# Patient Record
Sex: FEMALE | Race: WHITE | Employment: FULL TIME | URBAN - METROPOLITAN AREA
[De-identification: names, ages, dates, MRNs, and addresses within clinical notes are randomized per-mention and may not be internally consistent; named-entity substitution may affect disease eponyms.]

---

## 2018-12-20 PROBLEM — E78.2 MIXED HYPERLIPIDEMIA: Status: ACTIVE | Noted: 2018-12-20

## 2018-12-20 PROBLEM — E55.9 VITAMIN D DEFICIENCY: Status: ACTIVE | Noted: 2018-12-20

## 2018-12-20 PROBLEM — F41.9 ANXIETY: Status: ACTIVE | Noted: 2018-12-20

## 2018-12-20 PROBLEM — Z98.84 HISTORY OF GASTRIC BYPASS: Status: ACTIVE | Noted: 2018-12-20

## 2018-12-20 PROBLEM — F33.41 RECURRENT MAJOR DEPRESSIVE DISORDER, IN PARTIAL REMISSION (HCC): Status: ACTIVE | Noted: 2018-12-20

## 2018-12-20 PROBLEM — I10 ESSENTIAL HYPERTENSION: Status: ACTIVE | Noted: 2018-12-20

## 2018-12-20 PROBLEM — K21.9 GASTROESOPHAGEAL REFLUX DISEASE WITHOUT ESOPHAGITIS: Status: ACTIVE | Noted: 2018-12-20

## 2018-12-21 ENCOUNTER — HOSPITAL ENCOUNTER (OUTPATIENT)
Dept: GENERAL RADIOLOGY | Age: 58
Discharge: HOME | End: 2018-12-21
Attending: INTERNAL MEDICINE
Payer: COMMERCIAL

## 2018-12-21 DIAGNOSIS — M54.41 ACUTE BILATERAL LOW BACK PAIN WITH BILATERAL SCIATICA: ICD-10-CM

## 2018-12-21 DIAGNOSIS — M54.42 ACUTE BILATERAL LOW BACK PAIN WITH BILATERAL SCIATICA: ICD-10-CM

## 2018-12-21 PROCEDURE — 72110 X-RAY EXAM L-2 SPINE 4/>VWS: CPT

## 2018-12-24 NOTE — PROGRESS NOTES
DAYANNA on VM informing pt of Lumbar Spine Xray and Dr. George Schulz will review labs in detail at [address]

## 2019-01-17 ENCOUNTER — HOSPITAL ENCOUNTER (OUTPATIENT)
Dept: MRI IMAGING | Age: 59
Discharge: HOME | End: 2019-01-17
Attending: INTERNAL MEDICINE
Payer: COMMERCIAL

## 2019-01-17 DIAGNOSIS — M54.41 ACUTE RIGHT-SIDED LOW BACK PAIN WITH RIGHT-SIDED SCIATICA: ICD-10-CM

## 2019-01-17 PROCEDURE — 72148 MRI LUMBAR SPINE W/O DYE: CPT

## 2019-01-18 NOTE — PROGRESS NOTES
LM on VM informing pt of Lumbar Spine MRI results, referral to Neurosurgery and they will call the schedule appt.

## 2019-01-18 NOTE — PROGRESS NOTES
Lumbar MRI revealed L4-5 disc herniation, moderate to severe spinal stenosis; I have referred her to NS;

## 2019-01-31 PROBLEM — M51.26 HNP (HERNIATED NUCLEUS PULPOSUS), LUMBAR: Status: ACTIVE | Noted: 2019-01-31

## 2019-01-31 PROBLEM — M48.062 SPINAL STENOSIS OF LUMBAR REGION WITH NEUROGENIC CLAUDICATION: Status: ACTIVE | Noted: 2019-01-31

## 2019-01-31 PROBLEM — M54.41 ACUTE BILATERAL LOW BACK PAIN WITH BILATERAL SCIATICA: Status: ACTIVE | Noted: 2019-01-31

## 2019-01-31 PROBLEM — M54.42 ACUTE BILATERAL LOW BACK PAIN WITH BILATERAL SCIATICA: Status: ACTIVE | Noted: 2019-01-31

## 2019-02-06 ENCOUNTER — HOSPITAL ENCOUNTER (OUTPATIENT)
Dept: PHYSICAL THERAPY | Age: 59
End: 2019-02-06
Payer: COMMERCIAL

## 2019-02-07 ENCOUNTER — HOSPITAL ENCOUNTER (OUTPATIENT)
Dept: PHYSICAL THERAPY | Age: 59
Discharge: HOME | End: 2019-02-07
Payer: COMMERCIAL

## 2019-02-07 PROCEDURE — 97161 PT EVAL LOW COMPLEX 20 MIN: CPT

## 2019-02-07 PROCEDURE — 97110 THERAPEUTIC EXERCISES: CPT

## 2019-02-07 NOTE — THERAPY EVALUATION
Debra Iglesias  : 1960      Payor: Nimesh Sterling / Plan: [facility] / Product Type: PPO /  [address]. [facility], [address], [place], [address] [place] [address] Road  [phone]   [phone]       OUTPATIENT PHYSICAL THERAPY:Initial Assessment and PM 2019  Visit # 1   ICD-10: Treatment Diagnosis:    Low back pain (M54.5)        Sciatica, right side (M54.31)     Sciatica, left side (M54.32)     Lumbago with sciatica, right side (M54.41)     Lumbago with sciatica, left side (M54.42)       Precautions/Allergies:   Penicillins   Fall Risk Score: 5 (? 5 = High Risk)  MD Orders: Eval and Treat  MEDICAL/REFERRING DIAGNOSIS:  Other intervertebral disc displacement, lumbar region [M51.26]  Spinal stenosis, lumbar region with neurogenic claudication [M48.062]  Lumbago with sciatica, left side [M54.42]  Lumbago with sciatica, right side [M54.41]   DATE OF ONSET: *18  REFERRING PHYSICIAN: Marcellus Aquino MD  RETURN PHYSICIAN APPOINTMENT: 2019     INITIAL ASSESSMENT:  Ms. Debra Iglesias has attended 1 physical therapy session including initial evaluation. Debra Iglesias exhibits decreased flexibility, increased pain, decreased postural and core strength, decreased functional tolerance. Most painful movements at this time are extension, flexion. Symptoms are down R LE with lateral shift to R in walking and standing. MRI revealed: Broad-based L4-5 disc herniation, secondary moderate-severe spinal stenosis    Debra Iglesias will benefit from skilled PT (medically necessary) to address above deficits affecting participation in basic ADLs and overall functional tolerance.   Manual techniques (stretching, joint mobilizations, soft tissue mobilization/myofascial release), postural exercises/education, therapeutic techniques/activities, and HEP will be performed as appropriate addressing Trevon Files DARLENE Ventura's current condition. PROBLEM LIST (Impacting functional limitations):  1. Decreased Strength  2. Decreased ADL/Functional Activities  3. Decreased Transfer Abilities  4. Decreased Ambulation Ability/Technique  5. Decreased Balance  6. Increased Pain  7. Decreased Activity Tolerance  8. Increased Fatigue  9. Increased Shortness of Breath  10. Decreased Flexibility/Joint Mobility  11. Decreased [place] with Home Exercise Program INTERVENTIONS PLANNED:  1. Balance Exercise  2. Bed Mobility  3. Cold  4. Cryotherapy  5. Electrical Stimulation  6. Family Education  7. Gait Training  8. Heat  9. Home Exercise Program (HEP)  10. Manual Therapy  11. Neuromuscular Re-education/Strengthening  12. Range of Motion (ROM)  13. Therapeutic Activites  14. Therapeutic Exercise/Strengthening  15. Transfer Training   TREATMENT PLAN:  Effective Dates: 2/7/19 TO 5/8/2019 (90 days). Frequency/Duration: 2 times a week for 90 Days  GOALS: (Goals have been discussed and agreed upon with patient.)  Short Term Goals 4 weeks   1. Ravi Estrada will be independent with HEP  2. Ravi Estrada will participate in LE stretching program to increase flexibility  3. Ravi Estrada will participate in core stabilization exercises to help with stabilization during ADLs  4. Ravi Estrada will participate in LE strengthening program with weights as appropriate to help with gait and elevations  5. Ravi Estrada will participate in static and dynamic balance activities to decrease the risk for falls  6. Ravi Estrada will tolerate manual therapy/joint mobilizations/soft tissue to increase ROM and decrease pain  7. Ravi Estrada will be able to walk without pain and lift a laundry basket  Without minimal difficulty. Long Term Goals 8 weeks   1. Ravi Estrada will demonstrate a 10 point improvement on the Oswestry to show improvement in function  2.  Ravi Estrada will report 0/10 pain at rest and during ADLs  3. Elmer Hopping will demonstrate 5/5 LE strength on manual muscle testing  4. Elmer Hopping will be able to perform SLS >5 seconds bilaterally to help with gait and improve balance  Rehabilitation Potential For Stated Goals: Good  Regarding Onel Ventura's therapy, I certify that the treatment plan above will be carried out by a therapist or under their direction. Thank you for this referral,  Lena Chavez DPT     Referring Physician Signature: Joseph Bailon MD              Date                    HISTORY:   History of Present Injury/Illness (Reason for Referral):  I've been having back pain since September of last year. I had a sciatica episode and it developed into what is now this. I started having trouble walking from my desk to the printer. The tripping episodes really were what brought me into the MD.  I can't even lie in bed, I sleep in the sofa. I switched to Dr. Breann Sebastian and she referred me to DE[facility]MENT [facility] after my Xray and MRI. I saw Rob's NP and she said I have to do PT and  Pain management in order for the surgery to be covered by insurance. -Present symptoms/complaints (on day of evaluation)  Pain Scale:  · Current: 10/10  · Best: 9/10  · Worst: 10/10    Past Medical History/Comorbidities:   Ms. Ga Vargas  has a past medical history of Anxiety, Back problem, Depression, GERD (gastroesophageal reflux disease), History of mammogram (2014), History of Papanicolaou smear of cervix (>5 years aog), and Hyperlipidemia. Ms. Ga Vargas  has a past surgical history that includes hx cholecystectomy; pr breast surgery procedure unlisted (2014); hx gastric bypass (2014); and hx milka and bso.   Social History/Living Environment:        Social History     Socioeconomic History    Marital status:      Spouse name: Not on file    Number of children: 0    Years of education: Not on file    Highest education level: Not on file   Social Needs    Financial resource strain: Not on file    Food insecurity - worry: Not on file    Food insecurity - inability: Not on file    Transportation needs - medical: Not on file   [facility] needs - non-medical: Not on file   Occupational History    Occupation: [address], collections   Tobacco Use    Smoking status: Former Smoker     Packs/day: 1.00     Years: 30.00     Pack years: 30.00     Types: Cigarettes     Last attempt to quit: 2018     Years since quittin.1    Smokeless tobacco: Never Used   Substance and Sexual Activity    Alcohol use: Yes     Comment: Socially    Drug use: No    Sexual activity: Not on file   Other Topics Concern    Not on file   Social History Narrative    Not on file     Prior Level of Function/Work/Activity:  Independent, no issues, no difficulty. Active Ambulatory Problems     Diagnosis Date Noted    Vitamin D deficiency 2018    Recurrent major depressive disorder, in partial remission ([facility] [address] 2018    History of gastric bypass 2018    Anxiety 2018    Essential hypertension 2018    Mixed hyperlipidemia 2018    Gastroesophageal reflux disease without esophagitis 2018    HNP (herniated nucleus pulposus), lumbar 2019    Spinal stenosis of lumbar region with neurogenic claudication 2019    Acute bilateral low back pain with bilateral sciatica 2019     Resolved Ambulatory Problems     Diagnosis Date Noted    No Resolved Ambulatory Problems     Past Medical History:   Diagnosis Date    Anxiety     Back problem     Depression     GERD (gastroesophageal reflux disease)     History of mammogram     History of Papanicolaou smear of cervix >5 years aog    Hyperlipidemia      Note: Patient denies any increase of symptoms with cough, sneeze or valsalva. Patient denies any saddle paresthesia or bowel/bladder deficits.    Personal Factors:          Sex:  female        Age:  60 y.o.3     Ambulatory/Rehab Services H2 Model Falls Risk Assessment    Risk Factors:       (5)  History of Recent Falls [w/in 3 months] Ability to Rise from Chair:       (0)  Ability to rise in a single movement    Falls Prevention Plan:       Physical Limitations to Exercise (specify): CGA with all gait/mobility   Total: (5 or greater = High Risk): 5    ©2010 [facility] [employer]. All Rights Reserved. [facility] Patent #[id number]. Federal Law prohibits the replication, distribution or use without written permission from [facility] [employer]     Current Medications:    Current Outpatient Medications:     promethazine (PHENERGAN) 12.5 mg tablet, Take 1 Tab by mouth every eight (8) hours as needed for Nausea., Disp: 8 Tab, Rfl: 0    HYDROcodone-acetaminophen (NORCO) 5-325 mg per tablet, Take 2 Tabs by mouth every six (6) hours as needed for Pain. Max Daily Amount: 8 Tabs., Disp: 40 Tab, Rfl: 0    busPIRone (BUSPAR) 10 mg tablet, TAKE 1 TABLET BY MOUTH TWICE A DAY, Disp: 60 Tab, Rfl: 5    Cyclobenzaprine 7.5 mg tablet, Take 7.5 mg by mouth three (3) times daily for 30 days. , Disp: 30 Tab, Rfl: 5    DULoxetine (CYMBALTA) 30 mg capsule, TAKE 1 CAPSULE BY MOUTH TWICE A DAY, Disp: 30 Cap, Rfl: 5    ergocalciferol (ERGOCALCIFEROL) 50,000 unit capsule, Take 1 Cap by mouth every seven (7) days for 30 days. , Disp: 4 Cap, Rfl: 5    gabapentin (NEURONTIN) 300 mg capsule, TAKE 1 CAPSULE BY MOUTH TWICE A DAY, Disp: 60 Cap, Rfl: 5    hydroCHLOROthiazide (MICROZIDE) 12.5 mg capsule, Take 1 Cap by mouth daily. , Disp: 30 Cap, Rfl: 5    Omeprazole delayed release (PRILOSEC D/R) 20 mg tablet, Take 1 Tab by mouth daily. OTC, Disp: 30 Tab, Rfl: 5    simvastatin (ZOCOR) 10 mg tablet, Take 1 Tab by mouth daily. , Disp: 30 Tab, Rfl: 5    FLUoxetine (PROZAC) 40 mg capsule, Take 1 Cap by mouth daily. , Disp: , Rfl:     multivitamin (ONE A DAY) tablet, Take 1 Tab by mouth daily. , Disp: , Rfl:      Date Last Reviewed:  2/7/2019   Number of Personal Factors/Comorbidities that affect the Plan of Care: 3+: HIGH COMPLEXITY   EXAMINATION:   Observation/Orthostatic Postural Assessment:          Leans to L in order to get pain relief, but has a lot of medial to lateral sway. Palpation:          R PSIS and lower lumbar segements on R  Lying down is preference. Special Tests:               JORDAN= Positive              SLR (<60 degrees)= Positive              SLUMP= Positive                                  SI Joint Tests: Negative [place], JORDAN, Thigh Thrust, ASIS Distraction, Sacral Compression    Lower Quarter Screen Eval Date: 2.7.19  Re-Assess Date:  Re-Assess Date:    Active ROM of Lumbar Spine  RIGHT LEFT RIGHT LEFT RIGHT LEFT          Flexion 1 foot from ground tips of fingers. Extension To neutral only                Side flexion 60% 40%              Rotation 70% 100%       Deep Squat (for general function)         Single Leg Stance (Bilateral) or Sidelying Hip Abduction (L5, S1)         Toe Walking (S1) Painful short step length        Heel walking (L4,5) Painful and decreased                           MMT         Resisted Hip Flexion (L1,2) 4+/5   Unable to lift up in sitting due to pressure on R LE. Resisted Knee Extension or Unilateral Sit to Stand (L3,4) 4/5 5/5       Great Toe Extension (L5) Decreased Normal        Sensory Examination (EYES SHUT; dermatomes, use cotton ball on bare skin; L2 top of thigh, L3 Medial Knee; L4 Lateral Thigh, L5 Web space of great toe, S1 outside of foot, S2 inside of foot)     S1 diminished, L4 diminished Normal       Reflex testing (0, 1+, 2+, 3+, 4+)              Patella Decreased 1+ Decreased 1+            Achilles Decreased 1+ Decreased 1+                 ROM         Hip flexion + SLR; 70 deg 80 deg with +SLR for R LE with this movement.                            Upper Motor Neuron Test if needed (Sanchezs, Babinski, Clonus, Supra Patella, Inverted Supinator Sign) Negative Negative Neurological Screen:    RADIATING SYMPTOMS?: YES/NO---YES down lateral R leg and over dorsum of foot. Functional Mobility:  Affecting participation in basic ADLs and functional tasks. Balance:          Cannot balance on R LE. Body Structures Involved:  1. Bones  2. Joints  3. Muscles  4. Ligaments Body Functions Affected:  1. Sensory/Pain  2. Neuromusculoskeletal  3. Movement Related Activities and Participation Affected:  1. Mobility  2. Self Care   Number of elements that affect the Plan of Care: 4+: HIGH COMPLEXITY   CLINICAL PRESENTATION:   Presentation: Evolving clinical presentation with changing clinical characteristics: MODERATE COMPLEXITY   CLINICAL DECISION MAKING:   Outcome Measure: Tool Used: Modified Oswestry Low Back Pain Questionnaire  Score:  Initial: 43/50  Most Recent: X/50 (Date: -- )   Interpretation of Score: Each section is scored on a 0-5 scale, 5 representing the greatest disability. The scores of each section are added together for a total score of 50. Score 0 1-10 11-20 21-30 31-40 41-49 50   Modifier CH CI CJ CK CL CM CN       Medical Necessity:   · Skilled intervention continues to be required due to low back pain radicular in nature affecting gait and mobility and QOL. Reason for Services/Other Comments:  · Patient continues to require skilled intervention due to  above deficits affecting participation in basic ADLs and overall functional tolerance. Use of outcome tool(s) and clinical judgement create a POC that gives a: Clear prediction of patient's progress: LOW COMPLEXITY      Treatment/Session Assessment:  Glendora Osler verbalized understanding of role of PT and POC. · Pain/ Symptoms: Initial:   10/10 Post Session:  10/10 ·   Compliance with Program/Exercises: Will assess as treatment progresses. · Recommendations/Intent for next treatment session: \"Next visit will focus on advancements to more challenging activities\".     Future Appointments   Date Time Provider Mahsa Renuka   3/5/2019 11:15 AM Arun Diggs MD [address]   7/9/2019  3:40 PM Ghazala Grewal MD [facility] MAT       Total Treatment Duration:  PT Patient Time In/Time Out  Time In: 1320  Time Out: [facility] AUNG PetitT

## 2019-02-07 NOTE — PROGRESS NOTES
Celena Howell  : 1960  Payor: Doreen Botello / Plan: [facility] / Product Type: PPO /  [address]. Sheyla Mota., [address] [place] [address], [facility], [address] [place] [address]  [phone]   [phone]          OUTPATIENT PHYSICAL THERAPY: Daily Treatment Note 2019 Visit Count:  1    Pre-treatment Symptoms/Complaints:  Pain in standing and all positions. Improves with opening R LE. Pain: Initial:   10/10 Post Session:  10/10   Medications Last Reviewed:  2019   Updated Objective Findings:  None today   TREATMENT:   THERAPEUTIC EXERCISE: (25 minutes):  Exercises per grid below to improve mobility, strength and balance. Required minimal visual, verbal and manual cues to promote proper body alignment and promote proper body posture. Progressed resistance and complexity of movement as indicated. Date:  19 Date:   Date:     Activity/Exercise Parameters Parameters Parameters   LTR To L only     SKTC 30\"x3 R LE only      Nerve glides Next session. [place]? MANUAL THERAPY: (-next visit- minutes): Joint mobilization and Soft tissue mobilization was utilized and necessary because of the patient's restricted joint motion and restricted motion of soft tissue. Techniques to open R LE   SL L Grade II mobs to decrease pain. [employer] Portal  Treatment/Session Summary:    · Response to Treatment:  Able to find position of some relief. .  · Communication/Consultation:  None today  · Equipment provided today:  HEP   Recommendations/Intent for next treatment session: Next visit will focus on opening up R side . Treatment Plan of Care Effective Dates: 19 TO 2019 (90 days).   Frequency/Duration: 2 times a week for 90 Days  Total Treatment Billable Duration:  25 minutes  PT Patient Time In/Time Out  Time In: 1320  Time Out: 1 Saman Almendarez DPT    Future Appointments   Date Time Provider Mahsa Grayson 3/5/2019 11:15 AM Jessy Ballard MD [address]   7/9/2019  3:40 PM Eryn Infante MD [facility] MAT

## 2019-02-12 ENCOUNTER — HOSPITAL ENCOUNTER (OUTPATIENT)
Dept: PHYSICAL THERAPY | Age: 59
Discharge: HOME | End: 2019-02-12
Payer: COMMERCIAL

## 2019-02-12 PROCEDURE — 97140 MANUAL THERAPY 1/> REGIONS: CPT

## 2019-02-12 PROCEDURE — 97110 THERAPEUTIC EXERCISES: CPT

## 2019-02-12 NOTE — PROGRESS NOTES
[place]  : 1960  Payor: Carmen Severance / Plan: [facility] / Product Type: PPO /  [address] [facility] at [address] [place] [address] [address] [place] [address]  [phone]   [phone]          OUTPATIENT PHYSICAL THERAPY: Daily Treatment Note 2019 Visit Count:  2    Pre-treatment Symptoms/Complaints:  Pain in standing and all positions. Improves with opening R LE. Pain: Initial:   1/10 Post Session:  1/10   Medications Last Reviewed:  2019   Updated Objective Findings:  None today   TREATMENT:   THERAPEUTIC EXERCISE: (25 minutes):  Exercises per grid below to improve mobility, strength and balance. Required minimal visual, verbal and manual cues to promote proper body alignment and promote proper body posture. Progressed resistance and complexity of movement as indicated. Date:  19 Date:  19   Date:     Activity/Exercise Parameters Parameters Parameters   LTR To L only 10\"x10 L only. SKTC 30\"x3 R LE only  30\"x3 R LE only     Nerve glides Next session. ---    Elsworth Fines?  ----    NuStep  5' minutes level 2. MANUAL THERAPY: (-*15* minutes): Joint mobilization and Soft tissue mobilization was utilized and necessary because of the patient's restricted joint motion and restricted motion of soft tissue. Techniques to open R LE   SL L Grade II mobs to decrease pain. [employer] Portal  Treatment/Session Summary:  Started NuStep to encourage movement and for possible nerve glide. · Response to Treatment:  Able to find position of some relief. .  · Communication/Consultation:  None today  · Equipment provided today:  HEP   Recommendations/Intent for next treatment session: Next visit will focus on opening up R side . Treatment Plan of Care Effective Dates: 19 TO 2019 (90 days).   Frequency/Duration: 2 times a week for 90 Days  Total Treatment Billable Duration:  38 minutes  PT Patient Time In/Time Out  Time In: 1338  Time Out: [address] Tati Dykes, DPT    Future Appointments   Date Time Provider Mahsa Gilli   2/12/2019  1:45 PM Liz Vasquez DPT [facility]   2/14/2019  1:45 PM Liz Vasquez DPT [facility]   2/19/2019  1:45 PM Liz Vasquez DPT [facility]   2/21/2019  2:30 PM Liz Vasquez DPT [facility]   2/26/2019  2:30 PM Liz Vasquez DPT [facility]   2/28/2019  2:30 PM Liz Vasquez DPT [facility]   3/5/2019 11:15 AM Salvador Kaufman MD [place] [id number]   3/5/2019  1:45 PM Liz Vasquez DPT [facility]   3/7/2019  1:45 PM Liz Vasquez DPT [facility]   7/9/2019  3:40 PM Uriel Montenegro MD [facility]

## 2019-02-13 NOTE — PROGRESS NOTES
I am accessing Ms. Ventura's chart as a part of our department's internal chart auditing process. I certify that Ms. Lyle Allen is, or was, a patient in our department.   Thank you,  Anjana Licea  2/13/2019

## 2019-02-14 ENCOUNTER — HOSPITAL ENCOUNTER (OUTPATIENT)
Dept: PHYSICAL THERAPY | Age: 59
Discharge: HOME | End: 2019-02-14
Payer: COMMERCIAL

## 2019-02-14 PROCEDURE — 97110 THERAPEUTIC EXERCISES: CPT

## 2019-02-14 PROCEDURE — 97140 MANUAL THERAPY 1/> REGIONS: CPT

## 2019-02-14 NOTE — PROGRESS NOTES
Irasema Wolfe  : 1960  Payor: Justin Spivey / Plan: [facility] / Product Type: PPO /  [address]. [address] [place] [address], [facility], [address] [place] [address] Road  [phone]   [phone]          OUTPATIENT PHYSICAL THERAPY: Daily Treatment Note 2019 Visit Count:  3    Pre-treatment Symptoms/Complaints:  Pain in standing and all positions. Improves with opening R LE. Pain: Initial:   1/10 Post Session:  1/10   Medications Last Reviewed:  2019   Updated Objective Findings:  None today   TREATMENT:   THERAPEUTIC EXERCISE: (15 minutes):  Exercises per grid below to improve mobility, strength and balance. Required minimal visual, verbal and manual cues to promote proper body alignment and promote proper body posture. Progressed resistance and complexity of movement as indicated. Date:  19 Date:  19   Date:  19     Activity/Exercise Parameters Parameters Parameters   LTR To L only 10\"x10 L only. 10x10\"   SKTC 30\"x3 R LE only  30\"x3 R LE only  30\"x3 R LE only    Nerve glides Next session. ---    Sharda Pringle?  ---- nv   NuStep  5' minutes level 2. 5' minutes level 2. Hip flexion iso   5\" x 5   Hip adduction   5\"x10x2         MANUAL THERAPY: (-*25* minutes): Joint mobilization and Soft tissue mobilization was utilized and necessary because of the patient's restricted joint motion and restricted motion of soft tissue. Techniques to open R LE   SL L Grade II mobs to decrease pain. [employer] Portal  Treatment/Session Summary:  Feels better end of session. · Response to Treatment:  Able to find position of some relief. .  · Communication/Consultation:  None today  · Equipment provided today:  HEP   Recommendations/Intent for next treatment session: Next visit will focus on opening up R side . Treatment Plan of Care Effective Dates: 19 TO 2019 (90 days).   Frequency/Duration: 2 times a week for 90 Days  Total Treatment Billable Duration:  40 minutes  PT Patient Time In/Time Out  Time In: 1340  Time Out: [address], DPT    Future Appointments   Date Time Provider Mahsa Grayson   2/19/2019  1:45 PM Dipika Treadwell, DPT [facility]   2/21/2019  2:30 PM Dipika Treadwell, DPT [facility]   2/26/2019  2:30 PM Dipika Treadwell, DPT [facility]   2/28/2019  2:30 PM Dipika Treadwell, DPT [facility]   3/5/2019 11:15 AM Ganga Patel MD [place] [address]   3/5/2019  1:45 PM Dipika Treadwell, [facility]PT [facility]   3/7/2019  1:45 PM Dipika Treadwell, DPT [facility]   7/9/2019  3:40 PM Cresenciano Favre, MD [facility]

## 2019-02-19 ENCOUNTER — APPOINTMENT (OUTPATIENT)
Dept: PHYSICAL THERAPY | Age: 59
End: 2019-02-19
Payer: COMMERCIAL

## 2019-02-19 NOTE — Clinical Note
CONSULTATION NOTE Patient: Diamond Chen     DOS: 2/19/2019 Physician: Sherie Lui MD    
SSN:  [id number]     MRN: [id number] Referring: Angel Allen. Arsen Torres MD 
 
Subjective:  
 
HISTORY OF PRESENT ILLNESS: *** PAST HISTORY: 
Past Medical History:  
Diagnosis Date  Anxiety  Back problem  Depression  GERD (gastroesophageal reflux disease)  History of mammogram 2014  History of Papanicolaou smear of cervix >5 years aog  Hyperlipidemia ALLERGIES:  
Allergies Allergen Reactions  Penicillins Hives MEDICATIONS: 
Current Outpatient Medications Medication Sig Dispense Refill  methocarbamol (ROBAXIN) 750 mg tablet Take 1 tablet by mouth every 6 to 8 hours.  promethazine (PHENERGAN) 12.5 mg tablet Take 1 Tab by mouth every eight (8) hours as needed for Nausea. 8 Tab 0  
 HYDROcodone-acetaminophen (NORCO) 5-325 mg per tablet Take 2 Tabs by mouth every six (6) hours as needed for Pain. Max Daily Amount: 8 Tabs. 40 Tab 0  
 busPIRone (BUSPAR) 10 mg tablet TAKE 1 TABLET BY MOUTH TWICE A DAY 60 Tab 5  DULoxetine (CYMBALTA) 30 mg capsule TAKE 1 CAPSULE BY MOUTH TWICE A DAY 30 Cap 5  
 gabapentin (NEURONTIN) 300 mg capsule TAKE 1 CAPSULE BY MOUTH TWICE A DAY 60 Cap 5  hydroCHLOROthiazide (MICROZIDE) 12.5 mg capsule Take 1 Cap by mouth daily. 30 Cap 5  
 Omeprazole delayed release (PRILOSEC D/R) 20 mg tablet Take 1 Tab by mouth daily. OTC 30 Tab 5  
 simvastatin (ZOCOR) 10 mg tablet Take 1 Tab by mouth daily. 30 Tab 5  FLUoxetine (PROZAC) 40 mg capsule Take 1 Cap by mouth daily.  multivitamin (ONE A DAY) tablet Take 1 Tab by mouth daily. SURGICAL HISTORY:  
Past Surgical History:  
Procedure Laterality Date  BREAST SURGERY PROCEDURE UNLISTED  2014  
 breast implants  HX CHOLECYSTECTOMY  HX GASTRIC BYPASS  2014 Dr. Natasha Berumen  HX CHARO AND BSO    
  
 
SOCIAL HISTORY:  
Social History Socioeconomic History  Marital status:  Spouse name: Not on file  Number of children: 0  
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  
 Sexual activity: Not on file Other Topics Concern  Not on file Social History Narrative  Not on file REVIEW OF SYSTEMS:  
CONSTITUTIONAL: No weight change, fevers, illnesses. CARDIOVASCULAR/RESPIRATORY: negative. GASTROINTESTINAL/GENITOURINARY: otherwise, no side effects from medications. MUSCULOSKELETAL: see above. HEMATOLOGIC: no antiplatelet or anticoagulants; no hemophilia or other bleeding tendencies. NEUROLOGIC: no new deficits. PSYCHIATRIC: negative. ALLERGY: no new. otherwise non-contributory. The remainder of the past medical, family, and social histories were reviewed in detail in the initial pain assessment questionnaire and with the patient. Objective: PHYSICAL EXAM: 
HR: 97 RR: 14 BP: 130/78 Head and Neck: Normocephalic, Atraumatic, Cranial nerves II-XII intact, full range of motion in neck Chest and Abdomen: Heart: Regular Rate and Rhythm without murmur; clear lungs bilaterally Back: diffuse lumbosacral tenderness Extremities: no focal motor or sensory deficits; distal pulses palpable throughout; deep tendon reflexes symmetric and non-pathologic throughout IMAGING:MRI, 1-17-19: 
 
IMPRESSION: 
Broad-based L4-5 disc herniation, secondary moderate-severe spinal stenosis Impression/Plan: We are consulted by Dr. Kassi Moreno for evaluation and treatment to include an epidural steroid injection series. She will have the first under fluoroscopy here in clinic at the next available opportunity and a follow-up 2-3 weeks later to discuss a second unless pain-free or worse from the first.  
 
The benefits and relative risks of the procedure as well as treatment alternatives were explained in detail to the patient. The patient voiced their understanding and desire to proceed with the procedure. Signed By: Tayler Hill MD   
 February 19, 2019

## 2019-02-21 ENCOUNTER — APPOINTMENT (OUTPATIENT)
Dept: PHYSICAL THERAPY | Age: 59
End: 2019-02-21
Payer: COMMERCIAL

## 2019-02-24 ENCOUNTER — HOSPITAL ENCOUNTER (EMERGENCY)
Age: 59
Discharge: HOME | End: 2019-02-24
Attending: EMERGENCY MEDICINE
Payer: COMMERCIAL

## 2019-02-24 VITALS
SYSTOLIC BLOOD PRESSURE: 133 MMHG | OXYGEN SATURATION: 92 % | HEIGHT: 63 IN | RESPIRATION RATE: 26 BRPM | TEMPERATURE: 98.3 F | DIASTOLIC BLOOD PRESSURE: 69 MMHG | HEART RATE: 92 BPM | WEIGHT: 150 LBS | BODY MASS INDEX: 26.58 KG/M2

## 2019-02-24 DIAGNOSIS — M54.10 ACUTE LOW BACK PAIN WITH RADICULAR SYMPTOMS, DURATION LESS THAN 6 WEEKS: Primary | ICD-10-CM

## 2019-02-24 PROCEDURE — 99284 EMERGENCY DEPT VISIT MOD MDM: CPT | Performed by: EMERGENCY MEDICINE

## 2019-02-24 PROCEDURE — 96374 THER/PROPH/DIAG INJ IV PUSH: CPT | Performed by: EMERGENCY MEDICINE

## 2019-02-24 PROCEDURE — 74011250636 HC RX REV CODE- 250/636: Performed by: EMERGENCY MEDICINE

## 2019-02-24 PROCEDURE — 96375 TX/PRO/DX INJ NEW DRUG ADDON: CPT | Performed by: EMERGENCY MEDICINE

## 2019-02-24 RX ORDER — HYDROMORPHONE HYDROCHLORIDE 2 MG/ML
1 INJECTION, SOLUTION INTRAMUSCULAR; INTRAVENOUS; SUBCUTANEOUS
Status: COMPLETED | OUTPATIENT
Start: 2019-02-24 | End: 2019-02-24

## 2019-02-24 RX ORDER — MORPHINE SULFATE 15 MG/1
15 TABLET ORAL
Qty: 20 TAB | Refills: 0 | Status: SHIPPED | OUTPATIENT
Start: 2019-02-24 | End: 2019-02-27 | Stop reason: SDUPTHER

## 2019-02-24 RX ORDER — TIZANIDINE 4 MG/1
4 TABLET ORAL
COMMUNITY
End: 2019-04-09 | Stop reason: SDUPTHER

## 2019-02-24 RX ORDER — KETOROLAC TROMETHAMINE 30 MG/ML
15 INJECTION, SOLUTION INTRAMUSCULAR; INTRAVENOUS
Status: COMPLETED | OUTPATIENT
Start: 2019-02-24 | End: 2019-02-24

## 2019-02-24 RX ORDER — ONDANSETRON 2 MG/ML
4 INJECTION INTRAMUSCULAR; INTRAVENOUS
Status: COMPLETED | OUTPATIENT
Start: 2019-02-24 | End: 2019-02-24

## 2019-02-24 RX ORDER — DEXAMETHASONE SODIUM PHOSPHATE 100 MG/10ML
10 INJECTION INTRAMUSCULAR; INTRAVENOUS
Status: COMPLETED | OUTPATIENT
Start: 2019-02-24 | End: 2019-02-24

## 2019-02-24 RX ORDER — LEVETIRACETAM 500 MG/1
500 TABLET ORAL
COMMUNITY
End: 2019-03-11 | Stop reason: CLARIF

## 2019-02-24 RX ORDER — BACLOFEN 10 MG/1
10 TABLET ORAL
COMMUNITY
End: 2019-03-11 | Stop reason: CLARIF

## 2019-02-24 RX ORDER — METHYLPREDNISOLONE 4 MG/1
TABLET ORAL
Qty: 1 DOSE PACK | Refills: 0 | Status: SHIPPED | OUTPATIENT
Start: 2019-02-24 | End: 2019-03-11 | Stop reason: CLARIF

## 2019-02-24 RX ADMIN — HYDROMORPHONE HYDROCHLORIDE 1 MG: 2 INJECTION, SOLUTION INTRAMUSCULAR; INTRAVENOUS; SUBCUTANEOUS at 09:33

## 2019-02-24 RX ADMIN — KETOROLAC TROMETHAMINE 15 MG: 30 INJECTION, SOLUTION INTRAMUSCULAR at 09:32

## 2019-02-24 RX ADMIN — DEXAMETHASONE SODIUM PHOSPHATE 10 MG: 10 INJECTION INTRAMUSCULAR; INTRAVENOUS at 09:33

## 2019-02-24 RX ADMIN — ONDANSETRON 4 MG: 2 INJECTION INTRAMUSCULAR; INTRAVENOUS at 09:31

## 2019-02-24 NOTE — ED NOTES
I have reviewed discharge instructions with the patient. The patient verbalized understanding. Patient left ED via Discharge Method: ambulatory to Home with spouse. Opportunity for questions and clarification provided. Patient given 2 scripts. To continue your aftercare when you leave the hospital, you may receive an automated call from our care team to check in on how you are doing. This is a free service and part of our promise to provide the best care and service to meet your aftercare needs.  If you have questions, or wish to unsubscribe from this service please call [phone]. Thank you for Choosing our [facility] Emergency Department.

## 2019-02-24 NOTE — ED PROVIDER NOTES
Per nurse's notes: \"Pt to ED c/o lower back pain that shoots down right leg. Pt is a patient of Dr. Olinda Noland and had a lumbar epidural steroid injection on Thursday.  states pain has been worse since then. Pt barely able to walk and has to be coached to slow breathing. Pt taking keppra, tizanidine and baclofen w/o relief. Known L4 and L5 disc herniations. \" The history is provided by the patient. Back Pain This is a new problem. The current episode started more than 2 days ago. The problem has been gradually worsening. The problem occurs constantly. Patient reports not work related injury. The pain is associated with no known injury. The pain is present in the lower back and right side. The quality of the pain is described as stabbing, aching, sharp and shooting. The pain radiates to the right thigh, right knee and right foot. The pain is at a severity of 10/10. The symptoms are aggravated by bending, twisting and certain positions. The pain is the same all the time. Stiffness is present all day. Associated symptoms include tingling (extending from the right anterior hip down to the right knee and foot). Pertinent negatives include no chest pain, no fever, no numbness, no weight loss, no headaches, no abdominal pain, no abdominal swelling, no bowel incontinence, no perianal numbness, no bladder incontinence, no dysuria, no pelvic pain, no leg pain, no paresis and no weakness. She has tried bed rest (patient was given a epidural injection 3 days ago and has become progressively worse since that time.) for the symptoms. The treatment provided no relief. Risk factors include menopause. The patient's surgical history non-contributory Past Medical History:  
Diagnosis Date  Anxiety  Back problem  Depression  GERD (gastroesophageal reflux disease)  History of mammogram 2014  History of Papanicolaou smear of cervix >5 years aog  Hyperlipidemia Past Surgical History: Procedure Laterality Date  BREAST SURGERY PROCEDURE UNLISTED    
 breast implants  HX CHOLECYSTECTOMY  HX GASTRIC BYPASS   Dr. Quinones Leader  HX CHARO AND BSO Family History:  
Problem Relation Age of Onset  Breast Cancer Mother  Hypertension Mother  Stroke Father  Hypertension Father  Stroke Maternal Grandmother  Stroke Maternal Grandfather  Stroke Paternal Grandmother  Stroke Paternal Grandfather Social History Socioeconomic History  Marital status:  Spouse name: Not on file  Number of children: 0  
 Years of education: Not on file  Highest education level: Not on file Social Needs  Financial resource strain: Not on file  Food insecurity - worry: Not on file  Food insecurity - inability: Not on file  Transportation needs - medical: Not on file  Transportation needs - non-medical: Not on file Occupational History  Occupation: [employer], collections Tobacco Use  Smoking status: Former Smoker Packs/day: 1.00 Years: 30.00 Pack years: 30.00 Types: Cigarettes Last attempt to quit: 2018 Years since quittin.1  Smokeless tobacco: Never Used Substance and Sexual Activity  Alcohol use: Yes Comment: Socially  Drug use: No  
 Sexual activity: Not on file Other Topics Concern  Not on file Social History Narrative  Not on file ALLERGIES: Penicillins Review of Systems Constitutional: Negative for chills, fever and weight loss. Cardiovascular: Negative for chest pain. Gastrointestinal: Negative for abdominal pain, bowel incontinence, nausea and vomiting. Genitourinary: Negative for bladder incontinence, difficulty urinating, dysuria and pelvic pain. Musculoskeletal: Positive for back pain. Negative for neck pain and neck stiffness.   
Neurological: Positive for tingling (extending from the right anterior hip down to the right knee and foot). Negative for weakness, numbness and headaches. All other systems reviewed and are negative. Vitals:  
 02/24/19 0900 BP: 147/75 Pulse: 87 Resp: 26 Temp: 98.3 °F (36.8 °C) SpO2: 97% Weight: 68 kg (150 lb) Height: 5' 3\" (1.6 m) Physical Exam  
Constitutional: She is oriented to person, place, and time. She appears well-developed and well-nourished. She appears distressed. Well-developed  female in obvious pain and tearful on the cart HENT:  
Head: Normocephalic and atraumatic. Right Ear: External ear normal.  
Left Ear: External ear normal.  
Mouth/Throat: Oropharynx is clear and moist.  
Eyes: Conjunctivae and EOM are normal. Pupils are equal, round, and reactive to light. Neck: Normal range of motion. Neck supple. No thyromegaly present. Cardiovascular: Normal rate, regular rhythm, normal heart sounds and intact distal pulses. Pulmonary/Chest: Effort normal and breath sounds normal.  
Abdominal: Soft. Bowel sounds are normal. There is no tenderness. No hernia. Musculoskeletal: She exhibits no edema. Lumbar back: She exhibits decreased range of motion, tenderness and pain. She exhibits no swelling, no edema, no deformity, no spasm and normal pulse. Neurological: She is alert and oriented to person, place, and time. She has normal strength. No cranial nerve deficit or sensory deficit. Positive straight leg raise on the right Skin: Skin is warm and dry. Capillary refill takes less than 2 seconds. Psychiatric: She has a normal mood and affect. Nursing note and vitals reviewed. MDM Number of Diagnoses or Management Options Acute low back pain with radicular symptoms, duration less than 6 weeks: new and does not require workup Amount and/or Complexity of Data Reviewed Review and summarize past medical records: yes (Patient's MRI revealed some spinal stenosis as well as foraminal narrowing L4, L5 and HNP at L4-L5.) Risk of Complications, Morbidity, and/or Mortality Presenting problems: moderate Diagnostic procedures: minimal 
Management options: moderate Patient Progress Patient progress: improved Procedures

## 2019-02-24 NOTE — DISCHARGE INSTRUCTIONS
Patient Education        Learning About Relief for Back Pain  What is back tension and strain? Back strain happens when you overstretch, or pull, a muscle in your back. You may hurt your back in an accident or when you exercise or lift something. Most back pain will get better with rest and time. You can take care of yourself at home to help your back heal.  What can you do first to relieve back pain? When you first feel back pain, try these steps:  · Walk. Take a short walk (10 to 20 minutes) on a level surface (no slopes, hills, or stairs) every 2 to 3 hours. Walk only distances you can manage without pain, especially leg pain. · Relax. Find a comfortable position for rest. Some people are comfortable on the floor or a medium-firm bed with a small pillow under their head and another under their knees. Some people prefer to lie on their side with a pillow between their knees. Don't stay in one position for too long. · Try heat or ice. Try using a heating pad on a low or medium setting, or take a warm shower, for 15 to 20 minutes every 2 to 3 hours. Or you can buy single-use heat wraps that last up to 8 hours. You can also try an ice pack for 10 to 15 minutes every 2 to 3 hours. You can use an ice pack or a bag of frozen vegetables wrapped in a thin towel. There is not strong evidence that either heat or ice will help, but you can try them to see if they help. You may also want to try switching between heat and cold. · Take pain medicine exactly as directed. ¨ If the doctor gave you a prescription medicine for pain, take it as prescribed. ¨ If you are not taking a prescription pain medicine, ask your doctor if you can take an over-the-counter medicine. What else can you do? · Stretch and exercise. Exercises that increase flexibility may relieve your pain and make it easier for your muscles to keep your spine in a good, neutral position. And don't forget to keep walking. · Do self-massage.  You can use self-massage to unwind after work or school or to energize yourself in the morning. You can easily massage your feet, hands, or neck. Self-massage works best if you are in comfortable clothes and are sitting or lying in a comfortable position. Use oil or lotion to massage bare skin. · Reduce stress. Back pain can lead to a vicious [place]: Distress about the pain tenses the muscles in your back, which in turn causes more pain. Learn how to relax your mind and your muscles to lower your stress. Where can you learn more? Go to http://fidelWaldo Networksdae.info/. Enter F304 in the search box to learn more about \"Learning About Relief for Back Pain. \"  Current as of: March 21, 2017  Content Version: 11.5  © 1875-0955 [employer]. Care instructions adapted under license by [employer] (which disclaims liability or warranty for this information). If you have questions about a medical condition or this instruction, always ask your healthcare professional. [place] [address] any warranty or liability for your use of this information. Patient Education        Back Pain, Emergency or Urgent Symptoms: Care Instructions  Your Care Instructions    Many people have back pain at one time or another. In most cases, pain gets better with self-care that includes over-the-counter pain medicine, ice, heat, and exercises. Unless you have symptoms of a severe injury or heart attack, you may be able to give yourself a few days before you call a doctor. But some back problems are very serious. Do not ignore symptoms that need to be checked right away. Follow-up care is a key part of your treatment and safety. Be sure to make and go to all appointments, and call your doctor if you are having problems. It's also a good idea to know your test results and keep a list of the medicines you take. How can you care for yourself at home?   · Sit or lie in positions that are most comfortable and that reduce your pain. Try one of these positions when you lie down:  ? Lie on your back with your knees bent and supported by large pillows. ? Lie on the floor with your legs on the seat of a sofa or chair. ? Lie on your side with your knees and hips bent and a pillow between your legs. ? Lie on your stomach if it does not make pain worse. · Do not sit up in bed, and avoid soft couches and twisted positions. Bed rest can help relieve pain at first, but it delays healing. Avoid bed rest after the first day. · Change positions every 30 minutes. If you must sit for long periods of time, take breaks from sitting. Get up and walk around, or lie flat. · Try using a heating pad on a low or medium setting, for 15 to 20 minutes every 2 or 3 hours. Try a warm shower in place of one session with the heating pad. You can also buy single-use heat wraps that last up to 8 hours. You can also try ice or cold packs on your back for 10 to 20 minutes at a time, several times a day. (Put a thin cloth between the ice pack and your skin.) This reduces pain and makes it easier to be active and exercise. · Take pain medicines exactly as directed. ? If the doctor gave you a prescription medicine for pain, take it as prescribed. ? If you are not taking a prescription pain medicine, ask your doctor if you can take an over-the-counter medicine. When should you call for help? Call 911 anytime you think you may need emergency care. For example, call if:    · You are unable to move a leg at all.     · You have back pain with severe belly pain.     · You have symptoms of a heart attack. These may include:  ? Chest pain or pressure, or a strange feeling in the chest.  ? Sweating. ? Shortness of breath. ? Nausea or vomiting. ? Pain, pressure, or a strange feeling in the back, neck, jaw, or upper belly or in one or both shoulders or arms. ? Lightheadedness or sudden weakness. ? A fast or irregular heartbeat.   After you call 911, the  may tell you to chew 1 adult-strength or 2 to 4 low-dose aspirin. Wait for an ambulance. Do not try to drive yourself.    Call your doctor now or seek immediate medical care if:    · You have new or worse symptoms in your arms, legs, chest, belly, or buttocks. Symptoms may include:  ? Numbness or tingling. ? Weakness. ? Pain.     · You lose bladder or bowel control.     · You have back pain and:  ? You have injured your back while lifting or doing some other activity. Call if the pain is severe, has not gone away after 1 or 2 days, and you cannot do your normal daily activities. ? You have had a back injury before that needed treatment. ? Your pain has lasted longer than 4 weeks. ? You have had weight loss you cannot explain. ? You have a fever. ? You are age 48 or older. ? You have cancer now or have had it before.    Watch closely for changes in your health, and be sure to contact your doctor if you are not getting better as expected. Where can you learn more? Go to http://fidel-dae.info/. Enter K484 in the search box to learn more about \"Back Pain, Emergency or Urgent Symptoms: Care Instructions. \"  Current as of: September 23, 2018  Content Version: 11.9  © 2061-8175 [employer]. Care instructions adapted under license by [employer] (which disclaims liability or warranty for this information). If you have questions about a medical condition or this instruction, always ask your healthcare professional. [place] [address] any warranty or liability for your use of this information.

## 2019-02-24 NOTE — ED TRIAGE NOTES
Pt to ED c/o lower back pain that shoots down right leg. Pt is a patient of Dr. Karl Elaine and had a lumbar epidural steroid injection on Thursday.  states pain has been worse since then. Pt barely able to walk and has to be coached to slow breathing. Pt taking keppra, tizanidine and baclofen w/o relief. Known L4 and L5 disc herniations.

## 2019-02-26 ENCOUNTER — HOSPITAL ENCOUNTER (OUTPATIENT)
Dept: PHYSICAL THERAPY | Age: 59
Discharge: HOME | End: 2019-02-26
Payer: COMMERCIAL

## 2019-02-28 ENCOUNTER — APPOINTMENT (OUTPATIENT)
Dept: PHYSICAL THERAPY | Age: 59
End: 2019-02-28
Payer: COMMERCIAL

## 2019-02-28 ENCOUNTER — HOSPITAL ENCOUNTER (OUTPATIENT)
Dept: PHYSICAL THERAPY | Age: 59
Discharge: HOME | End: 2019-02-28
Payer: COMMERCIAL

## 2019-02-28 NOTE — THERAPY EVALUATION
Diamond Chen  : 1960      Payor: Alex Mann / Plan: [place] [facility] / Product Type: PPO /  Jojose luisephine Perla at [address], [place], [address] [address]  [phone]   [phone]       OUTPATIENT PHYSICAL THERAPY:Discharge and PM 2019  Visit # 4   ICD-10: Treatment Diagnosis:    Low back pain (M54.5)        Sciatica, right side (M54.31)     Sciatica, left side (M54.32)     Lumbago with sciatica, right side (M54.41)     Lumbago with sciatica, left side (M54.42)       Precautions/Allergies:   Penicillins   Fall Risk Score: 5 (? 5 = High Risk)  MD Orders: Eval and Treat  MEDICAL/REFERRING DIAGNOSIS:  Other intervertebral disc displacement, lumbar region [M51.26]  Spinal stenosis, lumbar region with neurogenic claudication [M48.062]  Lumbago with sciatica, left side [M54.42]  Lumbago with sciatica, right side [M54.41]   DATE OF ONSET: *18  REFERRING PHYSICIAN: Val Babb MD  RETURN PHYSICIAN APPOINTMENT: 2019     Diamond Chen has attended 4 visits including initial evaluation. She has asked case to be DC'd at this time. She reports having to go to ER and is returning to neurosurgeon. She did improve painwise while she was at PT. PROBLEM LIST (Impacting functional limitations):  1. Decreased Strength  2. Decreased ADL/Functional Activities  3. Decreased Transfer Abilities  4. Decreased Ambulation Ability/Technique  5. Decreased Balance  6. Increased Pain  7. Decreased Activity Tolerance  8. Increased Fatigue  9. Increased Shortness of Breath  10. Decreased Flexibility/Joint Mobility  11. Decreased [place] with Home Exercise Program INTERVENTIONS PLANNED:  1. Balance Exercise  2. Bed Mobility  3. Cold  4. Cryotherapy  5. Electrical Stimulation  6. Family Education  7. Gait Training  8. Heat  9. Home Exercise Program (HEP)  10. Manual Therapy  11.  Neuromuscular Re-education/Strengthening  12. Range of Motion (ROM)  13. Therapeutic Activites  14. Therapeutic Exercise/Strengthening  15. Transfer Training   TREATMENT PLAN:  Effective Dates: 2/7/19 TO 5/8/2019 (90 days). Frequency/Duration: 2 times a week for 90 Days  Not Met  GOALS: (Goals have been discussed and agreed upon with patient.)  Short Term Goals 4 weeks   1. Ronny Chacon will be independent with HEP  2. Ronny Chacon will participate in LE stretching program to increase flexibility  3. Ronny Chacon will participate in core stabilization exercises to help with stabilization during ADLs  4. Ronny Chacon will participate in LE strengthening program with weights as appropriate to help with gait and elevations  5. Ronny Chacon will participate in static and dynamic balance activities to decrease the risk for falls  6. Ronny Chacon will tolerate manual therapy/joint mobilizations/soft tissue to increase ROM and decrease pain  7. Ronny Chacon will be able to walk without pain and lift a laundry basket  Without minimal difficulty. Long Term Goals 8 weeks   1. Ronny Chacon will demonstrate a 10 point improvement on the Oswestry to show improvement in function  2. Ronny Chacon will report 0/10 pain at rest and during ADLs  3. Ronny Chacon will demonstrate 5/5 LE strength on manual muscle testing  4.  Ronny Chacon will be able to perform SLS >5 seconds bilaterally to help with gait and improve balance  Rehabilitation Potential For Stated Goals: Helder Ariza DPT

## 2019-03-05 ENCOUNTER — APPOINTMENT (OUTPATIENT)
Dept: PHYSICAL THERAPY | Age: 59
End: 2019-03-05

## 2019-03-05 PROBLEM — M54.16 LUMBAR RADICULOPATHY: Status: ACTIVE | Noted: 2019-03-05

## 2019-03-07 ENCOUNTER — APPOINTMENT (OUTPATIENT)
Dept: PHYSICAL THERAPY | Age: 59
End: 2019-03-07

## 2019-03-11 ENCOUNTER — HOSPITAL ENCOUNTER (OUTPATIENT)
Dept: SURGERY | Age: 59
Discharge: HOME | End: 2019-03-11
Payer: COMMERCIAL

## 2019-03-11 VITALS
OXYGEN SATURATION: 98 % | WEIGHT: 156.1 LBS | SYSTOLIC BLOOD PRESSURE: 135 MMHG | HEART RATE: 84 BPM | DIASTOLIC BLOOD PRESSURE: 95 MMHG | HEIGHT: 63 IN | BODY MASS INDEX: 27.66 KG/M2 | RESPIRATION RATE: 16 BRPM | TEMPERATURE: 98 F

## 2019-03-11 LAB
BACTERIA SPEC CULT: ABNORMAL
POTASSIUM SERPL-SCNC: 4.1 MMOL/L (ref 3.5–5.1)
SERVICE CMNT-IMP: ABNORMAL

## 2019-03-11 PROCEDURE — 84132 ASSAY OF SERUM POTASSIUM: CPT

## 2019-03-11 PROCEDURE — 87641 MR-STAPH DNA AMP PROBE: CPT

## 2019-03-11 PROCEDURE — 77030027138 HC INCENT SPIROMETER -A

## 2019-03-11 RX ORDER — OXYCODONE AND ACETAMINOPHEN 7.5; 325 MG/1; MG/1
1 TABLET ORAL
COMMUNITY
End: 2019-04-09 | Stop reason: ALTCHOICE

## 2019-03-11 RX ORDER — ERGOCALCIFEROL 1.25 MG/1
50000 CAPSULE ORAL
COMMUNITY
End: 2021-01-28 | Stop reason: SDUPTHER

## 2019-03-11 NOTE — PERIOP NOTES
Patient verified name & . Order to obtain consent  found in EHR and matches case posting. TYPE  CASE:1B              Orders:  received  Labs per Spine protocol:  MRSA/MSSA nasal swab   Labs per anesthesia protocol: potassium  EKG/cardiac records  :  Not indicated  Glucose: not indicated    Medication bottles visualized today. Instructed patient to continue previous medications as prescribed prior to surgery and  to take the following medications the day of surgery according to anesthesia guidelines with a small sip of water : Buspar, Cymbalta, Prilosec, Zocor and Prozac. .. Percocet if needed for pain       Continue all previous medications unless otherwise directed. Instructed patient to hold all vitamins & supplements 7 days prior to surgery and the following medications prior to surgery: none    Pt viewed Spine Pre-hab video. All further questions were addressed. Pt was provided with antibacterial soap, Hibiclens, long-handled sponge, Spine Recovery booklet and incentive spirometer. Pt correctly demonstrated use of incentive spirometer and instruction to bring it to the hospital on day of surgery. Handouts and all Surgery instructions provided to pt and pt verbalizes understanding. Patient Guide to Surgery Packet provided to patient. Packet includes Patient Guide to surgery handout, Facts about Pain Management handout, Facts about Urinary Catherization handout, Hand Hygiene handout, Patient Education and Teaching Sheet -Transfusion of Blood and Blood Products handout, and  [facility] frequent question and answer sheet. Guide reviewed with the patient and all questions answered to the satisfaction of the patient. Pt advised to visit www. [facility] [facility] for more educational information regarding anesthesia and to record any additional questions that arise so that it can be addressed by the anesthesiologist on the morning of surgery.     Patient instructed on the following and verbalized understanding:  Arrive at MAIN entrance, time of arrival to be called the day before by 1700. Responsible adult must drive patient to and from hospital, stay during surgery and 24 hours postoperatively. Npo after midnight including gum, mints and ice chips. Shower using hibiclens the night before and the morning of surgery. Hibiclens provided to the patient with handout and verbal instructions for use. Leave all valuables at home. Instructed on no jewelry or body piercings on the dos. Bring insurance card and ID. No perfumes, oil, powder, colognes, makeup or  lotions on the skin. Patient verbalized understanding of all instructions and provided all medical/health information to the best of their ability.

## 2019-03-11 NOTE — PERIOP NOTES
Recent Results (from the past 12 hour(s))   POTASSIUM    Collection Time: 03/11/19  9:32 AM   Result Value Ref Range    Potassium 4.1 3.5 - 5.1 mmol/L   MSSA/MRSA SC BY PCR, NASAL SWAB    Collection Time: 03/11/19  9:32 AM   Result Value Ref Range    Special Requests: NO SPECIAL REQUESTS      Culture result: (A)       MRSA target DNA not detected, SA target DNA detected. A MRSA negative, SA positive test result does not preclude MRSA nasal colonization. Results reviewed. Preop Nozin protocol and Ancef to cover. No further action required.

## 2019-03-14 ENCOUNTER — ANESTHESIA EVENT (OUTPATIENT)
Dept: SURGERY | Age: 59
End: 2019-03-14
Payer: COMMERCIAL

## 2019-03-15 ENCOUNTER — ANESTHESIA (OUTPATIENT)
Dept: SURGERY | Age: 59
End: 2019-03-15
Payer: COMMERCIAL

## 2019-03-15 ENCOUNTER — HOSPITAL ENCOUNTER (OUTPATIENT)
Age: 59
Setting detail: OUTPATIENT SURGERY
Discharge: HOME | End: 2019-03-15
Attending: NEUROLOGICAL SURGERY | Admitting: NEUROLOGICAL SURGERY
Payer: COMMERCIAL

## 2019-03-15 ENCOUNTER — APPOINTMENT (OUTPATIENT)
Dept: GENERAL RADIOLOGY | Age: 59
End: 2019-03-15
Attending: NEUROLOGICAL SURGERY
Payer: COMMERCIAL

## 2019-03-15 VITALS
SYSTOLIC BLOOD PRESSURE: 121 MMHG | OXYGEN SATURATION: 93 % | HEIGHT: 63 IN | BODY MASS INDEX: 26.84 KG/M2 | WEIGHT: 151.5 LBS | HEART RATE: 88 BPM | TEMPERATURE: 98.5 F | DIASTOLIC BLOOD PRESSURE: 74 MMHG | RESPIRATION RATE: 16 BRPM

## 2019-03-15 DIAGNOSIS — M48.062 LUMBAR STENOSIS WITH NEUROGENIC CLAUDICATION: ICD-10-CM

## 2019-03-15 PROCEDURE — 76210000006 HC OR PH I REC 0.5 TO 1 HR: Performed by: NEUROLOGICAL SURGERY

## 2019-03-15 PROCEDURE — 77030039267 HC ADH SKN EXOFIN S2SG -B: Performed by: NEUROLOGICAL SURGERY

## 2019-03-15 PROCEDURE — 72020 X-RAY EXAM OF SPINE 1 VIEW: CPT

## 2019-03-15 PROCEDURE — 77030037088 HC TUBE ENDOTRACH ORAL NSL COVD-A: Performed by: ANESTHESIOLOGY

## 2019-03-15 PROCEDURE — 77030030163 HC BN WAX J&J -A: Performed by: NEUROLOGICAL SURGERY

## 2019-03-15 PROCEDURE — 74011000250 HC RX REV CODE- 250

## 2019-03-15 PROCEDURE — 77030020782 HC GWN BAIR PAWS FLX 3M -B: Performed by: ANESTHESIOLOGY

## 2019-03-15 PROCEDURE — 77030018836 HC SOL IRR NACL ICUM -A: Performed by: NEUROLOGICAL SURGERY

## 2019-03-15 PROCEDURE — 74011000250 HC RX REV CODE- 250: Performed by: NEUROLOGICAL SURGERY

## 2019-03-15 PROCEDURE — 76010000162 HC OR TIME 1.5 TO 2 HR INTENSV-TIER 1: Performed by: NEUROLOGICAL SURGERY

## 2019-03-15 PROCEDURE — 76210000020 HC REC RM PH II FIRST 0.5 HR: Performed by: NEUROLOGICAL SURGERY

## 2019-03-15 PROCEDURE — 77030032490 HC SLV COMPR SCD KNE COVD -B: Performed by: NEUROLOGICAL SURGERY

## 2019-03-15 PROCEDURE — 76060000034 HC ANESTHESIA 1.5 TO 2 HR: Performed by: NEUROLOGICAL SURGERY

## 2019-03-15 PROCEDURE — 77030028270 HC SRGFL HEMSTAT MTRX J&J -C: Performed by: NEUROLOGICAL SURGERY

## 2019-03-15 PROCEDURE — 74011250637 HC RX REV CODE- 250/637: Performed by: NEUROLOGICAL SURGERY

## 2019-03-15 PROCEDURE — 74011250636 HC RX REV CODE- 250/636: Performed by: NEUROLOGICAL SURGERY

## 2019-03-15 PROCEDURE — 74011250637 HC RX REV CODE- 250/637: Performed by: ANESTHESIOLOGY

## 2019-03-15 PROCEDURE — 74011250636 HC RX REV CODE- 250/636

## 2019-03-15 PROCEDURE — 74011250636 HC RX REV CODE- 250/636: Performed by: ANESTHESIOLOGY

## 2019-03-15 PROCEDURE — 77030019908 HC STETH ESOPH SIMS -A: Performed by: ANESTHESIOLOGY

## 2019-03-15 PROCEDURE — 77030018390 HC SPNG HEMSTAT2 J&J -B: Performed by: NEUROLOGICAL SURGERY

## 2019-03-15 PROCEDURE — 77030039425 HC BLD LARYNG TRULITE DISP TELE -A: Performed by: ANESTHESIOLOGY

## 2019-03-15 PROCEDURE — 74011000250 HC RX REV CODE- 250: Performed by: ANESTHESIOLOGY

## 2019-03-15 PROCEDURE — 77030019940 HC BLNKT HYPOTHRM STRY -B: Performed by: ANESTHESIOLOGY

## 2019-03-15 PROCEDURE — 77030012894: Performed by: NEUROLOGICAL SURGERY

## 2019-03-15 PROCEDURE — 77030012935 HC DRSG AQUACEL BMS -B: Performed by: NEUROLOGICAL SURGERY

## 2019-03-15 PROCEDURE — 77030003029 HC SUT VCRL J&J -B: Performed by: NEUROLOGICAL SURGERY

## 2019-03-15 RX ORDER — SODIUM CHLORIDE, SODIUM LACTATE, POTASSIUM CHLORIDE, CALCIUM CHLORIDE 600; 310; 30; 20 MG/100ML; MG/100ML; MG/100ML; MG/100ML
75 INJECTION, SOLUTION INTRAVENOUS CONTINUOUS
Status: DISCONTINUED | OUTPATIENT
Start: 2019-03-15 | End: 2019-03-15 | Stop reason: HOSPADM

## 2019-03-15 RX ORDER — SODIUM CHLORIDE 0.9 % (FLUSH) 0.9 %
5-40 SYRINGE (ML) INJECTION EVERY 8 HOURS
Status: DISCONTINUED | OUTPATIENT
Start: 2019-03-15 | End: 2019-03-15 | Stop reason: HOSPADM

## 2019-03-15 RX ORDER — BUPIVACAINE HYDROCHLORIDE AND EPINEPHRINE 5; 5 MG/ML; UG/ML
INJECTION, SOLUTION EPIDURAL; INTRACAUDAL; PERINEURAL AS NEEDED
Status: DISCONTINUED | OUTPATIENT
Start: 2019-03-15 | End: 2019-03-15 | Stop reason: HOSPADM

## 2019-03-15 RX ORDER — SODIUM CHLORIDE 0.9 % (FLUSH) 0.9 %
5-40 SYRINGE (ML) INJECTION AS NEEDED
Status: DISCONTINUED | OUTPATIENT
Start: 2019-03-15 | End: 2019-03-15 | Stop reason: HOSPADM

## 2019-03-15 RX ORDER — LIDOCAINE HYDROCHLORIDE 20 MG/ML
INJECTION, SOLUTION EPIDURAL; INFILTRATION; INTRACAUDAL; PERINEURAL AS NEEDED
Status: DISCONTINUED | OUTPATIENT
Start: 2019-03-15 | End: 2019-03-15 | Stop reason: HOSPADM

## 2019-03-15 RX ORDER — PROPOFOL 10 MG/ML
INJECTION, EMULSION INTRAVENOUS AS NEEDED
Status: DISCONTINUED | OUTPATIENT
Start: 2019-03-15 | End: 2019-03-15 | Stop reason: HOSPADM

## 2019-03-15 RX ORDER — KETOROLAC TROMETHAMINE 30 MG/ML
INJECTION, SOLUTION INTRAMUSCULAR; INTRAVENOUS AS NEEDED
Status: DISCONTINUED | OUTPATIENT
Start: 2019-03-15 | End: 2019-03-15 | Stop reason: HOSPADM

## 2019-03-15 RX ORDER — SUCCINYLCHOLINE CHLORIDE 20 MG/ML
INJECTION INTRAMUSCULAR; INTRAVENOUS AS NEEDED
Status: DISCONTINUED | OUTPATIENT
Start: 2019-03-15 | End: 2019-03-15 | Stop reason: HOSPADM

## 2019-03-15 RX ORDER — ACETAMINOPHEN 500 MG
500 TABLET ORAL ONCE
Status: DISCONTINUED | OUTPATIENT
Start: 2019-03-15 | End: 2019-03-15 | Stop reason: HOSPADM

## 2019-03-15 RX ORDER — DEXAMETHASONE SODIUM PHOSPHATE 4 MG/ML
INJECTION, SOLUTION INTRA-ARTICULAR; INTRALESIONAL; INTRAMUSCULAR; INTRAVENOUS; SOFT TISSUE AS NEEDED
Status: DISCONTINUED | OUTPATIENT
Start: 2019-03-15 | End: 2019-03-15 | Stop reason: HOSPADM

## 2019-03-15 RX ORDER — NEOSTIGMINE METHYLSULFATE 1 MG/ML
INJECTION INTRAVENOUS AS NEEDED
Status: DISCONTINUED | OUTPATIENT
Start: 2019-03-15 | End: 2019-03-15 | Stop reason: HOSPADM

## 2019-03-15 RX ORDER — ONDANSETRON 2 MG/ML
4 INJECTION INTRAMUSCULAR; INTRAVENOUS ONCE
Status: DISCONTINUED | OUTPATIENT
Start: 2019-03-15 | End: 2019-03-15 | Stop reason: HOSPADM

## 2019-03-15 RX ORDER — DIPHENHYDRAMINE HYDROCHLORIDE 50 MG/ML
12.5 INJECTION, SOLUTION INTRAMUSCULAR; INTRAVENOUS ONCE
Status: DISCONTINUED | OUTPATIENT
Start: 2019-03-15 | End: 2019-03-15 | Stop reason: HOSPADM

## 2019-03-15 RX ORDER — OXYCODONE AND ACETAMINOPHEN 7.5; 325 MG/1; MG/1
1 TABLET ORAL
Qty: 20 TAB | Refills: 0 | Status: SHIPPED | OUTPATIENT
Start: 2019-03-15 | End: 2019-03-18

## 2019-03-15 RX ORDER — MIDAZOLAM HYDROCHLORIDE 1 MG/ML
2 INJECTION, SOLUTION INTRAMUSCULAR; INTRAVENOUS
Status: COMPLETED | OUTPATIENT
Start: 2019-03-15 | End: 2019-03-15

## 2019-03-15 RX ORDER — LIDOCAINE HYDROCHLORIDE 10 MG/ML
0.1 INJECTION INFILTRATION; PERINEURAL AS NEEDED
Status: DISCONTINUED | OUTPATIENT
Start: 2019-03-15 | End: 2019-03-15 | Stop reason: HOSPADM

## 2019-03-15 RX ORDER — CEFAZOLIN SODIUM/WATER 2 G/20 ML
2 SYRINGE (ML) INTRAVENOUS ONCE
Status: COMPLETED | OUTPATIENT
Start: 2019-03-15 | End: 2019-03-15

## 2019-03-15 RX ORDER — ACETAMINOPHEN 10 MG/ML
INJECTION, SOLUTION INTRAVENOUS AS NEEDED
Status: DISCONTINUED | OUTPATIENT
Start: 2019-03-15 | End: 2019-03-15 | Stop reason: HOSPADM

## 2019-03-15 RX ORDER — FENTANYL CITRATE 50 UG/ML
100 INJECTION, SOLUTION INTRAMUSCULAR; INTRAVENOUS AS NEEDED
Status: DISCONTINUED | OUTPATIENT
Start: 2019-03-15 | End: 2019-03-15 | Stop reason: HOSPADM

## 2019-03-15 RX ORDER — NALOXONE HYDROCHLORIDE 0.4 MG/ML
0.1 INJECTION, SOLUTION INTRAMUSCULAR; INTRAVENOUS; SUBCUTANEOUS AS NEEDED
Status: DISCONTINUED | OUTPATIENT
Start: 2019-03-15 | End: 2019-03-15 | Stop reason: HOSPADM

## 2019-03-15 RX ORDER — OXYCODONE HYDROCHLORIDE 5 MG/1
5 TABLET ORAL
Status: DISCONTINUED | OUTPATIENT
Start: 2019-03-15 | End: 2019-03-15 | Stop reason: HOSPADM

## 2019-03-15 RX ORDER — GLYCOPYRROLATE 0.2 MG/ML
INJECTION INTRAMUSCULAR; INTRAVENOUS AS NEEDED
Status: DISCONTINUED | OUTPATIENT
Start: 2019-03-15 | End: 2019-03-15 | Stop reason: HOSPADM

## 2019-03-15 RX ORDER — HYDROMORPHONE HYDROCHLORIDE 2 MG/ML
0.5 INJECTION, SOLUTION INTRAMUSCULAR; INTRAVENOUS; SUBCUTANEOUS
Status: DISCONTINUED | OUTPATIENT
Start: 2019-03-15 | End: 2019-03-15 | Stop reason: HOSPADM

## 2019-03-15 RX ORDER — OXYCODONE HYDROCHLORIDE 5 MG/1
10 TABLET ORAL
Status: COMPLETED | OUTPATIENT
Start: 2019-03-15 | End: 2019-03-15

## 2019-03-15 RX ORDER — ROCURONIUM BROMIDE 10 MG/ML
INJECTION, SOLUTION INTRAVENOUS AS NEEDED
Status: DISCONTINUED | OUTPATIENT
Start: 2019-03-15 | End: 2019-03-15 | Stop reason: HOSPADM

## 2019-03-15 RX ORDER — SODIUM CHLORIDE, SODIUM LACTATE, POTASSIUM CHLORIDE, CALCIUM CHLORIDE 600; 310; 30; 20 MG/100ML; MG/100ML; MG/100ML; MG/100ML
1000 INJECTION, SOLUTION INTRAVENOUS CONTINUOUS
Status: DISCONTINUED | OUTPATIENT
Start: 2019-03-15 | End: 2019-03-15 | Stop reason: HOSPADM

## 2019-03-15 RX ORDER — ONDANSETRON 2 MG/ML
INJECTION INTRAMUSCULAR; INTRAVENOUS AS NEEDED
Status: DISCONTINUED | OUTPATIENT
Start: 2019-03-15 | End: 2019-03-15 | Stop reason: HOSPADM

## 2019-03-15 RX ADMIN — ONDANSETRON 4 MG: 2 INJECTION INTRAMUSCULAR; INTRAVENOUS at 11:18

## 2019-03-15 RX ADMIN — Medication 2 G: at 10:20

## 2019-03-15 RX ADMIN — PROPOFOL 200 MG: 10 INJECTION, EMULSION INTRAVENOUS at 10:11

## 2019-03-15 RX ADMIN — GLYCOPYRROLATE 0.4 MG: 0.2 INJECTION INTRAMUSCULAR; INTRAVENOUS at 11:32

## 2019-03-15 RX ADMIN — DEXAMETHASONE SODIUM PHOSPHATE 4 MG: 4 INJECTION, SOLUTION INTRA-ARTICULAR; INTRALESIONAL; INTRAMUSCULAR; INTRAVENOUS; SOFT TISSUE at 10:19

## 2019-03-15 RX ADMIN — OXYCODONE HYDROCHLORIDE 10 MG: 5 TABLET ORAL at 12:34

## 2019-03-15 RX ADMIN — HYDROMORPHONE HYDROCHLORIDE 0.5 MG: 2 INJECTION, SOLUTION INTRAMUSCULAR; INTRAVENOUS; SUBCUTANEOUS at 12:00

## 2019-03-15 RX ADMIN — MIDAZOLAM HYDROCHLORIDE 2 MG: 2 INJECTION, SOLUTION INTRAMUSCULAR; INTRAVENOUS at 09:07

## 2019-03-15 RX ADMIN — LIDOCAINE HYDROCHLORIDE 100 MG: 20 INJECTION, SOLUTION EPIDURAL; INFILTRATION; INTRACAUDAL; PERINEURAL at 10:11

## 2019-03-15 RX ADMIN — PROMETHAZINE HYDROCHLORIDE 3.12 MG: 25 INJECTION INTRAMUSCULAR; INTRAVENOUS at 11:53

## 2019-03-15 RX ADMIN — SUCCINYLCHOLINE CHLORIDE 140 MG: 20 INJECTION INTRAMUSCULAR; INTRAVENOUS at 10:11

## 2019-03-15 RX ADMIN — ROCURONIUM BROMIDE 30 MG: 10 INJECTION, SOLUTION INTRAVENOUS at 10:19

## 2019-03-15 RX ADMIN — KETOROLAC TROMETHAMINE 30 MG: 30 INJECTION, SOLUTION INTRAMUSCULAR; INTRAVENOUS at 11:39

## 2019-03-15 RX ADMIN — ONDANSETRON 4 MG: 2 INJECTION INTRAMUSCULAR; INTRAVENOUS at 11:39

## 2019-03-15 RX ADMIN — ACETAMINOPHEN 1000 MG: 10 INJECTION, SOLUTION INTRAVENOUS at 11:19

## 2019-03-15 RX ADMIN — HYDROMORPHONE HYDROCHLORIDE 0.5 MG: 2 INJECTION, SOLUTION INTRAMUSCULAR; INTRAVENOUS; SUBCUTANEOUS at 11:55

## 2019-03-15 RX ADMIN — Medication 3 AMPULE: at 08:52

## 2019-03-15 RX ADMIN — SODIUM CHLORIDE, SODIUM LACTATE, POTASSIUM CHLORIDE, AND CALCIUM CHLORIDE 1000 ML: 600; 310; 30; 20 INJECTION, SOLUTION INTRAVENOUS at 08:51

## 2019-03-15 RX ADMIN — NEOSTIGMINE METHYLSULFATE 3 MG: 1 INJECTION INTRAVENOUS at 11:32

## 2019-03-15 NOTE — ANESTHESIA POSTPROCEDURE EVALUATION
Procedure(s):  RIGHT L4-5 SPINE LUMBAR LAMINECTOMY AND FACETECTOMY.     Anesthesia Post Evaluation      Multimodal analgesia: multimodal analgesia used between 6 hours prior to anesthesia start to PACU discharge  Patient location during evaluation: bedside  Patient participation: complete - patient participated  Level of consciousness: responsive to verbal stimuli  Pain management: adequate  Airway patency: patent  Anesthetic complications: no  Cardiovascular status: hemodynamically stable  Respiratory status: spontaneous ventilation  Hydration status: stable        Visit Vitals  /80 (BP 1 Location: Right arm, BP Patient Position: At rest)   Pulse 88   Temp 36.9 °C (98.5 °F)   Resp 16   Ht 5' 3\" (1.6 m)   Wt 68.7 kg (151 lb 8 oz)   SpO2 97%   BMI 26.84 kg/m²

## 2019-03-15 NOTE — PERIOP NOTES
11:46 AM  TORB from [address] MD for 6.25 mg IV phenergan now PRN every 10 min, 12.5 mg max dose. 11:49 AM  Report to ELVIS Espitia.

## 2019-03-15 NOTE — DISCHARGE INSTRUCTIONS
[facility]  [address]  [phone]    Postoperative Home Instructions  Lumbar (Back) Surgery    · Showering: You may shower the first day you are home with the dressing on. If the dressing becomes saturated, change it completely to a similar dressing. Leave the steri-strips or glue in place. You have the brown aquacel dressing, leave it alone for 5 days and then you may remove the dressing. Do not soak in a tub, and use only soap and water on the wound. · Wound Care: A small to moderate amount of reddish drainage on the dressing is normal the first 1-2 days after surgery. If the dressing becomes saturated, change it. Once the steri-strips begin to peel up on their own, you may gently take them off. Large amounts of clear, watery drainage is not normal and you should call our office immediately. · Signs of Infection: Extreme tenderness at the wound, excessive redness and/or swelling, or ugly yellowish-greenish drainage from the wound. Fever greater than 100.5 may be present. If you think you have a wound infection, call our office immediately. · Driving: You may not begin driving until after your visit to our office for a wound and suture check which is normally 7-10 days after you come home from the hospital.    · Medications: You should take anti-inflammatory medications such as Motrin, Celebrex for 30 days after surgery, every day, on a regular schedule only if prescribed by your physician. The pain medicine prescribed may be taken as needed. You should take a stool softener (Colace) twice a day, drink lots of water and eat high fiber foods to avoid constipation (this is a common problem with pain medicine.)    · Deep Breathing Exercises: Continue to do your incentive spirometry and/or deep breathing exercises to prevent risk of pneumonia. · Smoking: YOU MAY NOT SMOKE! Smoking will interfere with your healing.   If you smoke, you may end up with having another surgery or more problems! · Activity: No heavy lifting for 4 weeks after surgery. This means anything heavier than a coffee cup or newspaper. After 4 weeks, you may gradually begin lifting heavier things. Avoid bending, stooping, or twisting at the waist.  Do not lie on your stomach to sleep. · You may remove your Medardo hose when consistently walking. You may do steps and inclines in moderation. · Sexual Relations: You may resume sexual relations 2 weeks after your surgery. · Walking Program: You should begin walking every day on the first day after surgery. Start for short distances, then go a little farther each day. You should eventually walk 1-2 miles every day for the long term. This is very important in your recovery period because walking strengthens the spinal muscles and will help protect your disc and vertebrae. · Symptoms after Surgery: Dont be alarmed if you still have some symptoms after surgery. The nerves often require time to heal after the pressure has been taken off. Be patient, you should see improvement with time. · Follow-up: You will need to call our office for an appointment to see a nurse one week after surgery for a wound check. An appointment will be made then for you to see your surgeon about 4 weeks after surgery. Please call our office if you have any other questions or problems. Listen to your body; it will tell you if you are overdoing it. Use common sense and take care of yourself! After general anesthesia or intravenous sedation, for 24 hours or while taking prescription Narcotics:  · Limit your activities  · Do not drive and operate hazardous machinery  · Do not make important personal or business decisions  · Do  not drink alcoholic beverages  · If you have not urinated within 8 hours after discharge, please contact your surgeon on call.     *  Please give a list of your current medications to your Primary Care Provider. *  Please update this list whenever your medications are discontinued, doses are      changed, or new medications (including over-the-counter products) are added. *  Please carry medication information at all times in case of emergency situations. These are general instructions for a healthy lifestyle:  No smoking/ No tobacco products/ Avoid exposure to second hand smoke  Surgeon General's Warning:  Quitting smoking now greatly reduces serious risk to your health. Obesity, smoking, and sedentary lifestyle greatly increases your risk for illness  A healthy diet, regular physical exercise & weight monitoring are important for maintaining a healthy lifestyle    Recognize signs and symptoms of STROKE:  F-face looks uneven  A-arms unable to move or move unevenly  S-speech slurred or non-existent  T-time-call 911 as soon as signs and symptoms begin-DO NOT go       Back to bed or wait to see if you get better-TIME IS BRAIN.

## 2019-03-15 NOTE — BRIEF OP NOTE
BRIEF OPERATIVE NOTE    Date of Procedure: 3/15/2019   Preoperative Diagnosis: Spinal stenosis, lumbar region, with neurogenic claudication [M48.062]  Postoperative Diagnosis: Spinal stenosis, lumbar region, with neurogenic claudication [M48.062]    Procedure(s):  RIGHT L4-5 SPINE LUMBAR LAMINECTOMY AND FACETECTOMY  Surgeon(s) and Role:     * Nisha Kidd MD - Primary         Surgical Assistant: NONE    Surgical Staff:  Circ-1: Cesia Fisher RN  Circ-2: Trav Trevino RN  Radiology Technician: RT Maribel, R, CT  Scrub Tech-1: Linda Santana  Scrub Tech-2: Stacey Brody  Nurse Practitioner: Aly Hayden NP  Event Time In Time Out   Incision Start 1030    Incision Close       Anesthesia: General   Estimated Blood Loss: MINIMAL  Specimens: * No specimens in log *   Findings: STENOSIS   Complications: NONE  Implants: * No implants in log *

## 2019-03-15 NOTE — OP NOTES
[address] [facility]  OPERATIVE REPORT    Name:  Tate Talley  MR#:  [id number]  :  1960  ACCOUNT #:  [de-identified]  DATE OF SERVICE:  03/15/2019    PREOPERATIVE DIAGNOSIS:  Right L5 radiculopathy secondary to stenosis. POSTOPERATIVE DIAGNOSIS:  Right L5 radiculopathy secondary to stenosis. PROCEDURE PERFORMED:  Right L4-L5 laminectomy, facetectomy, and foraminotomy. SURGEON:  Santos Garzon. Boris Collier MD    ASSISTANT:  None. ANESTHESIA:  General endotracheal.    COMPLICATIONS:  None. SPECIMENS REMOVED:  None. IMPLANTS:  None. ESTIMATED BLOOD LOSS:  Minimal.    PREPARATION:  ChloraPrep. HISTORY OF PRESENT ILLNESS:  A 63-year lady with intractable right lower extremity neurogenic claudication refractory to conservative measures. MRI scan was positive for spinal stenosis on the right at L4-5. The patient was admitted for surgery as conservative measures have failed. PROCEDURE:  The patient was brought to the operating room, was carefully placed under general endotracheal anesthesia without complications. She was carefully turned prone on the Cloward frame and the posterior aspect of back was shaved and prepped in the usual sterile fashion. Midline incision was made overlying L4 and L5. Muscles were stripped in the right lateral subperiosteal plane with cautery and elevators and deep retractors were placed. Lateral lumbar spine x-ray confirmed the instrument pointing at L4-L5. Next, laminectomy and facetectomy were carried out with 3 and 4 mm Kerrison rongeurs. Significant bony ligamentous hypertrophy were present. The ligamentum was resected to expose the dural sac and a very hyperemic right L5 nerve root, which was decompressed distally with 2 mm Kerrison rongeur. No fragments were palpated with the ball-tip probe. The wound was irrigated until clear. Bone edges were waxed. Thrombin and Surgiflo were placed for hemostasis. The wound was dry and it was closed. The fascia was closed tightly with interrupted 0 Vicryl. 0.5% Marcaine with epinephrine was used to infiltrate the muscle for postoperative analgesia. The subcutaneous tissues were closed with interrupted 0 Vicryl. The skin was closed with Exofin tape and pressure dressing. The patient tolerated the procedure well, was turned supine, awakened, extubated, and taken to PACU in stable condition. There were no obvious complications. DISCHARGE INSTRUCTIONS:  Diet regular. Activity, up as tolerated. No heavy lifting, bending, or automobile driving. Showers are permissible but no baths. The patient will contact the physician if she develops any fever, drainage, swelling, cellulitis, or neurological change. Return visit in 10 to 14 days for wound check. DISCHARGE MEDICATIONS:  Include admission medicines plus Percocet 7.5/325 q.8 hours p.r.n. DISCHARGE CONDITION:  Satisfactory.       MD GRACIE [email or web address]  D:  03/15/2019 11:37  T:  03/15/2019 19:35  JOB #:  [id number]

## 2019-03-15 NOTE — ANESTHESIA PREPROCEDURE EVALUATION
Anesthetic History   No history of anesthetic complications            Review of Systems / Medical History  Patient summary reviewed and pertinent labs reviewed    Pulmonary        Sleep apnea: No treatment  Smoker (Former Quit 2018)         Neuro/Psych         Psychiatric history     Cardiovascular    Hypertension              Exercise tolerance: >4 METS     GI/Hepatic/Renal     GERD           Endo/Other             Other Findings   Comments: S/p Gastric Bypass           Physical Exam    Airway  Mallampati: II  TM Distance: 4 - 6 cm  Neck ROM: normal range of motion   Mouth opening: Normal     Cardiovascular    Rhythm: regular  Rate: normal         Dental  No notable dental hx       Pulmonary  Breath sounds clear to auscultation               Abdominal  GI exam deferred       Other Findings            Anesthetic Plan    ASA: 3  Anesthesia type: general          Induction: Intravenous  Anesthetic plan and risks discussed with: Patient

## 2019-03-29 ENCOUNTER — ANESTHESIA (OUTPATIENT)
Dept: SURGERY | Age: 59
DRG: 029 | End: 2019-03-29
Payer: COMMERCIAL

## 2019-03-29 ENCOUNTER — ANESTHESIA EVENT (OUTPATIENT)
Dept: SURGERY | Age: 59
DRG: 029 | End: 2019-03-29
Payer: COMMERCIAL

## 2019-03-29 ENCOUNTER — HOSPITAL ENCOUNTER (INPATIENT)
Age: 59
LOS: 4 days | Discharge: HOME | DRG: 029 | End: 2019-04-02
Attending: NEUROLOGICAL SURGERY | Admitting: NEUROLOGICAL SURGERY
Payer: COMMERCIAL

## 2019-03-29 DIAGNOSIS — G96.00 CSF LEAK: Primary | ICD-10-CM

## 2019-03-29 LAB
ABO + RH BLD: NORMAL
BLOOD GROUP ANTIBODIES SERPL: NORMAL
SPECIMEN EXP DATE BLD: NORMAL

## 2019-03-29 PROCEDURE — 77030012894: Performed by: NEUROLOGICAL SURGERY

## 2019-03-29 PROCEDURE — 77030020255 HC SOL INJ LR 1000ML BG

## 2019-03-29 PROCEDURE — 77030018390 HC SPNG HEMSTAT2 J&J -B: Performed by: NEUROLOGICAL SURGERY

## 2019-03-29 PROCEDURE — 77030002946 HC SUT NRLN J&J -B: Performed by: NEUROLOGICAL SURGERY

## 2019-03-29 PROCEDURE — 77030013951 HC SEAL TISS ADH DURASL KT INLC -G1: Performed by: NEUROLOGICAL SURGERY

## 2019-03-29 PROCEDURE — 77030037088 HC TUBE ENDOTRACH ORAL NSL COVD-A: Performed by: NURSE ANESTHETIST, CERTIFIED REGISTERED

## 2019-03-29 PROCEDURE — 74011000250 HC RX REV CODE- 250: Performed by: NEUROLOGICAL SURGERY

## 2019-03-29 PROCEDURE — 74011250636 HC RX REV CODE- 250/636: Performed by: NEUROLOGICAL SURGERY

## 2019-03-29 PROCEDURE — 74011250636 HC RX REV CODE- 250/636

## 2019-03-29 PROCEDURE — 74011000250 HC RX REV CODE- 250

## 2019-03-29 PROCEDURE — 77030032490 HC SLV COMPR SCD KNE COVD -B: Performed by: NEUROLOGICAL SURGERY

## 2019-03-29 PROCEDURE — 00UT0KZ SUPPLEMENT SPINAL MENINGES WITH NONAUTOLOGOUS TISSUE SUBSTITUTE, OPEN APPROACH: ICD-10-PCS | Performed by: NEUROLOGICAL SURGERY

## 2019-03-29 PROCEDURE — 77030003029 HC SUT VCRL J&J -B: Performed by: NEUROLOGICAL SURGERY

## 2019-03-29 PROCEDURE — 77030020782 HC GWN BAIR PAWS FLX 3M -B: Performed by: NURSE ANESTHETIST, CERTIFIED REGISTERED

## 2019-03-29 PROCEDURE — 77030039425 HC BLD LARYNG TRULITE DISP TELE -A: Performed by: NURSE ANESTHETIST, CERTIFIED REGISTERED

## 2019-03-29 PROCEDURE — 77030002916 HC SUT ETHLN J&J -A: Performed by: NEUROLOGICAL SURGERY

## 2019-03-29 PROCEDURE — 77030018836 HC SOL IRR NACL ICUM -A: Performed by: NEUROLOGICAL SURGERY

## 2019-03-29 PROCEDURE — 76060000033 HC ANESTHESIA 1 TO 1.5 HR: Performed by: NEUROLOGICAL SURGERY

## 2019-03-29 PROCEDURE — 74011250636 HC RX REV CODE- 250/636: Performed by: ANESTHESIOLOGY

## 2019-03-29 PROCEDURE — 86900 BLOOD TYPING SEROLOGIC ABO: CPT

## 2019-03-29 PROCEDURE — 77030034849: Performed by: NEUROLOGICAL SURGERY

## 2019-03-29 PROCEDURE — 74011250637 HC RX REV CODE- 250/637: Performed by: NEUROLOGICAL SURGERY

## 2019-03-29 PROCEDURE — 77030019908 HC STETH ESOPH SIMS -A: Performed by: NURSE ANESTHETIST, CERTIFIED REGISTERED

## 2019-03-29 PROCEDURE — 76210000016 HC OR PH I REC 1 TO 1.5 HR: Performed by: NEUROLOGICAL SURGERY

## 2019-03-29 PROCEDURE — 76010000161 HC OR TIME 1 TO 1.5 HR INTENSV-TIER 1: Performed by: NEUROLOGICAL SURGERY

## 2019-03-29 PROCEDURE — 65270000029 HC RM PRIVATE

## 2019-03-29 PROCEDURE — 77030030163 HC BN WAX J&J -A: Performed by: NEUROLOGICAL SURGERY

## 2019-03-29 PROCEDURE — 77030002700 HC GRFT DURA SUTRBL INLC -D: Performed by: NEUROLOGICAL SURGERY

## 2019-03-29 DEVICE — DURAGEN® DURAL GRAFT MATRIX 1PK, 2X2 DOM
Type: IMPLANTABLE DEVICE | Site: SPINE LUMBAR | Status: FUNCTIONAL
Brand: DURAGEN®

## 2019-03-29 RX ORDER — PANTOPRAZOLE SODIUM 40 MG/1
40 TABLET, DELAYED RELEASE ORAL
Status: DISCONTINUED | OUTPATIENT
Start: 2019-03-30 | End: 2019-04-02 | Stop reason: HOSPADM

## 2019-03-29 RX ORDER — LIDOCAINE HYDROCHLORIDE 20 MG/ML
INJECTION, SOLUTION EPIDURAL; INFILTRATION; INTRACAUDAL; PERINEURAL AS NEEDED
Status: DISCONTINUED | OUTPATIENT
Start: 2019-03-29 | End: 2019-03-29 | Stop reason: HOSPADM

## 2019-03-29 RX ORDER — SODIUM CHLORIDE, SODIUM LACTATE, POTASSIUM CHLORIDE, CALCIUM CHLORIDE 600; 310; 30; 20 MG/100ML; MG/100ML; MG/100ML; MG/100ML
75 INJECTION, SOLUTION INTRAVENOUS CONTINUOUS
Status: DISCONTINUED | OUTPATIENT
Start: 2019-03-29 | End: 2019-04-02 | Stop reason: HOSPADM

## 2019-03-29 RX ORDER — SUCCINYLCHOLINE CHLORIDE 20 MG/ML
INJECTION INTRAMUSCULAR; INTRAVENOUS AS NEEDED
Status: DISCONTINUED | OUTPATIENT
Start: 2019-03-29 | End: 2019-03-29 | Stop reason: HOSPADM

## 2019-03-29 RX ORDER — ACETAMINOPHEN 10 MG/ML
1000 INJECTION, SOLUTION INTRAVENOUS
Status: COMPLETED | OUTPATIENT
Start: 2019-03-29 | End: 2019-03-29

## 2019-03-29 RX ORDER — ROCURONIUM BROMIDE 10 MG/ML
INJECTION, SOLUTION INTRAVENOUS AS NEEDED
Status: DISCONTINUED | OUTPATIENT
Start: 2019-03-29 | End: 2019-03-29 | Stop reason: HOSPADM

## 2019-03-29 RX ORDER — DEXAMETHASONE SODIUM PHOSPHATE 4 MG/ML
INJECTION, SOLUTION INTRA-ARTICULAR; INTRALESIONAL; INTRAMUSCULAR; INTRAVENOUS; SOFT TISSUE AS NEEDED
Status: DISCONTINUED | OUTPATIENT
Start: 2019-03-29 | End: 2019-03-29 | Stop reason: HOSPADM

## 2019-03-29 RX ORDER — ERGOCALCIFEROL 1.25 MG/1
50000 CAPSULE ORAL
Status: DISCONTINUED | OUTPATIENT
Start: 2019-04-01 | End: 2019-04-02 | Stop reason: HOSPADM

## 2019-03-29 RX ORDER — SODIUM CHLORIDE, SODIUM LACTATE, POTASSIUM CHLORIDE, CALCIUM CHLORIDE 600; 310; 30; 20 MG/100ML; MG/100ML; MG/100ML; MG/100ML
25 INJECTION, SOLUTION INTRAVENOUS CONTINUOUS
Status: DISCONTINUED | OUTPATIENT
Start: 2019-03-29 | End: 2019-04-02 | Stop reason: HOSPADM

## 2019-03-29 RX ORDER — ACETAMINOPHEN 325 MG/1
650 TABLET ORAL
Status: DISCONTINUED | OUTPATIENT
Start: 2019-03-29 | End: 2019-04-02 | Stop reason: HOSPADM

## 2019-03-29 RX ORDER — BACITRACIN 50000 [IU]/1
INJECTION, POWDER, FOR SOLUTION INTRAMUSCULAR AS NEEDED
Status: DISCONTINUED | OUTPATIENT
Start: 2019-03-29 | End: 2019-03-29 | Stop reason: HOSPADM

## 2019-03-29 RX ORDER — SODIUM CHLORIDE 0.9 % (FLUSH) 0.9 %
5-40 SYRINGE (ML) INJECTION EVERY 8 HOURS
Status: DISCONTINUED | OUTPATIENT
Start: 2019-03-29 | End: 2019-04-02 | Stop reason: HOSPADM

## 2019-03-29 RX ORDER — GLYCOPYRROLATE 0.2 MG/ML
INJECTION INTRAMUSCULAR; INTRAVENOUS AS NEEDED
Status: DISCONTINUED | OUTPATIENT
Start: 2019-03-29 | End: 2019-03-29 | Stop reason: HOSPADM

## 2019-03-29 RX ORDER — ONDANSETRON 2 MG/ML
4 INJECTION INTRAMUSCULAR; INTRAVENOUS
Status: DISCONTINUED | OUTPATIENT
Start: 2019-03-29 | End: 2019-03-29 | Stop reason: HOSPADM

## 2019-03-29 RX ORDER — FENTANYL CITRATE 50 UG/ML
INJECTION, SOLUTION INTRAMUSCULAR; INTRAVENOUS AS NEEDED
Status: DISCONTINUED | OUTPATIENT
Start: 2019-03-29 | End: 2019-03-29 | Stop reason: HOSPADM

## 2019-03-29 RX ORDER — ZOLPIDEM TARTRATE 5 MG/1
10 TABLET ORAL
Status: DISCONTINUED | OUTPATIENT
Start: 2019-03-29 | End: 2019-03-29

## 2019-03-29 RX ORDER — NEOSTIGMINE METHYLSULFATE 1 MG/ML
INJECTION INTRAVENOUS AS NEEDED
Status: DISCONTINUED | OUTPATIENT
Start: 2019-03-29 | End: 2019-03-29 | Stop reason: HOSPADM

## 2019-03-29 RX ORDER — ONDANSETRON 2 MG/ML
INJECTION INTRAMUSCULAR; INTRAVENOUS AS NEEDED
Status: DISCONTINUED | OUTPATIENT
Start: 2019-03-29 | End: 2019-03-29 | Stop reason: HOSPADM

## 2019-03-29 RX ORDER — BUSPIRONE HYDROCHLORIDE 5 MG/1
10 TABLET ORAL 2 TIMES DAILY
Status: DISCONTINUED | OUTPATIENT
Start: 2019-03-29 | End: 2019-04-02 | Stop reason: HOSPADM

## 2019-03-29 RX ORDER — TIZANIDINE 2 MG/1
4 TABLET ORAL
Status: DISCONTINUED | OUTPATIENT
Start: 2019-03-29 | End: 2019-04-02 | Stop reason: HOSPADM

## 2019-03-29 RX ORDER — OXYCODONE AND ACETAMINOPHEN 10; 325 MG/1; MG/1
1 TABLET ORAL
Status: DISCONTINUED | OUTPATIENT
Start: 2019-03-29 | End: 2019-04-02 | Stop reason: HOSPADM

## 2019-03-29 RX ORDER — SODIUM CHLORIDE 0.9 % (FLUSH) 0.9 %
5-40 SYRINGE (ML) INJECTION AS NEEDED
Status: DISCONTINUED | OUTPATIENT
Start: 2019-03-29 | End: 2019-04-02 | Stop reason: HOSPADM

## 2019-03-29 RX ORDER — PROPOFOL 10 MG/ML
INJECTION, EMULSION INTRAVENOUS AS NEEDED
Status: DISCONTINUED | OUTPATIENT
Start: 2019-03-29 | End: 2019-03-29 | Stop reason: HOSPADM

## 2019-03-29 RX ORDER — HYDROCHLOROTHIAZIDE 12.5 MG/1
12.5 CAPSULE ORAL DAILY
Status: DISCONTINUED | OUTPATIENT
Start: 2019-03-30 | End: 2019-04-02 | Stop reason: HOSPADM

## 2019-03-29 RX ORDER — FENTANYL CITRATE 50 UG/ML
50 INJECTION, SOLUTION INTRAMUSCULAR; INTRAVENOUS
Status: DISCONTINUED | OUTPATIENT
Start: 2019-03-29 | End: 2019-03-29 | Stop reason: HOSPADM

## 2019-03-29 RX ORDER — SIMVASTATIN 20 MG/1
10 TABLET, FILM COATED ORAL
Status: DISCONTINUED | OUTPATIENT
Start: 2019-03-29 | End: 2019-04-02 | Stop reason: HOSPADM

## 2019-03-29 RX ORDER — HYDROMORPHONE HYDROCHLORIDE 1 MG/ML
1 INJECTION, SOLUTION INTRAMUSCULAR; INTRAVENOUS; SUBCUTANEOUS
Status: DISCONTINUED | OUTPATIENT
Start: 2019-03-29 | End: 2019-04-02 | Stop reason: HOSPADM

## 2019-03-29 RX ORDER — ALBUTEROL SULFATE 0.83 MG/ML
2.5 SOLUTION RESPIRATORY (INHALATION) AS NEEDED
Status: DISCONTINUED | OUTPATIENT
Start: 2019-03-29 | End: 2019-03-29 | Stop reason: HOSPADM

## 2019-03-29 RX ORDER — HYDROMORPHONE HYDROCHLORIDE 2 MG/ML
0.5 INJECTION, SOLUTION INTRAMUSCULAR; INTRAVENOUS; SUBCUTANEOUS
Status: DISCONTINUED | OUTPATIENT
Start: 2019-03-29 | End: 2019-03-29 | Stop reason: HOSPADM

## 2019-03-29 RX ORDER — ZOLPIDEM TARTRATE 5 MG/1
5 TABLET ORAL
Status: DISCONTINUED | OUTPATIENT
Start: 2019-03-29 | End: 2019-04-02 | Stop reason: HOSPADM

## 2019-03-29 RX ORDER — DULOXETIN HYDROCHLORIDE 30 MG/1
30 CAPSULE, DELAYED RELEASE ORAL 2 TIMES DAILY
Status: DISCONTINUED | OUTPATIENT
Start: 2019-03-29 | End: 2019-04-02 | Stop reason: HOSPADM

## 2019-03-29 RX ORDER — KETOROLAC TROMETHAMINE 30 MG/ML
INJECTION, SOLUTION INTRAMUSCULAR; INTRAVENOUS AS NEEDED
Status: DISCONTINUED | OUTPATIENT
Start: 2019-03-29 | End: 2019-03-29 | Stop reason: HOSPADM

## 2019-03-29 RX ADMIN — SUCCINYLCHOLINE CHLORIDE 110 MG: 20 INJECTION INTRAMUSCULAR; INTRAVENOUS at 14:08

## 2019-03-29 RX ADMIN — ROCURONIUM BROMIDE 10 MG: 10 INJECTION, SOLUTION INTRAVENOUS at 14:19

## 2019-03-29 RX ADMIN — NEOSTIGMINE METHYLSULFATE 3 MG: 1 INJECTION INTRAVENOUS at 14:57

## 2019-03-29 RX ADMIN — KETOROLAC TROMETHAMINE 30 MG: 30 INJECTION, SOLUTION INTRAMUSCULAR; INTRAVENOUS at 14:41

## 2019-03-29 RX ADMIN — SODIUM CHLORIDE, SODIUM LACTATE, POTASSIUM CHLORIDE, AND CALCIUM CHLORIDE 75 ML/HR: 600; 310; 30; 20 INJECTION, SOLUTION INTRAVENOUS at 18:34

## 2019-03-29 RX ADMIN — OXYCODONE HYDROCHLORIDE AND ACETAMINOPHEN 1 TABLET: 10; 325 TABLET ORAL at 21:16

## 2019-03-29 RX ADMIN — FENTANYL CITRATE 100 MCG: 50 INJECTION, SOLUTION INTRAMUSCULAR; INTRAVENOUS at 14:08

## 2019-03-29 RX ADMIN — HYDROMORPHONE HYDROCHLORIDE 0.5 MG: 2 INJECTION, SOLUTION INTRAMUSCULAR; INTRAVENOUS; SUBCUTANEOUS at 15:19

## 2019-03-29 RX ADMIN — TIZANIDINE 4 MG: 2 TABLET ORAL at 21:17

## 2019-03-29 RX ADMIN — ACETAMINOPHEN 1000 MG: 10 INJECTION, SOLUTION INTRAVENOUS at 15:29

## 2019-03-29 RX ADMIN — LIDOCAINE HYDROCHLORIDE 100 MG: 20 INJECTION, SOLUTION EPIDURAL; INFILTRATION; INTRACAUDAL; PERINEURAL at 14:08

## 2019-03-29 RX ADMIN — VANCOMYCIN HYDROCHLORIDE 1000 MG: 1 INJECTION, POWDER, LYOPHILIZED, FOR SOLUTION INTRAVENOUS at 12:44

## 2019-03-29 RX ADMIN — DEXAMETHASONE SODIUM PHOSPHATE 4 MG: 4 INJECTION, SOLUTION INTRA-ARTICULAR; INTRALESIONAL; INTRAMUSCULAR; INTRAVENOUS; SOFT TISSUE at 14:21

## 2019-03-29 RX ADMIN — SODIUM CHLORIDE, SODIUM LACTATE, POTASSIUM CHLORIDE, AND CALCIUM CHLORIDE 25 ML/HR: 600; 310; 30; 20 INJECTION, SOLUTION INTRAVENOUS at 12:31

## 2019-03-29 RX ADMIN — PROPOFOL 200 MG: 10 INJECTION, EMULSION INTRAVENOUS at 14:08

## 2019-03-29 RX ADMIN — VANCOMYCIN HYDROCHLORIDE 1 G: 1 INJECTION, POWDER, LYOPHILIZED, FOR SOLUTION INTRAVENOUS at 14:02

## 2019-03-29 RX ADMIN — ROCURONIUM BROMIDE 5 MG: 10 INJECTION, SOLUTION INTRAVENOUS at 14:08

## 2019-03-29 RX ADMIN — GLYCOPYRROLATE 0.4 MG: 0.2 INJECTION INTRAMUSCULAR; INTRAVENOUS at 14:57

## 2019-03-29 RX ADMIN — Medication 5 ML: at 18:00

## 2019-03-29 RX ADMIN — ONDANSETRON 4 MG: 2 INJECTION INTRAMUSCULAR; INTRAVENOUS at 14:20

## 2019-03-29 RX ADMIN — SIMVASTATIN 10 MG: 20 TABLET, FILM COATED ORAL at 21:16

## 2019-03-29 RX ADMIN — DULOXETINE 30 MG: 30 CAPSULE, DELAYED RELEASE ORAL at 18:33

## 2019-03-29 RX ADMIN — BUSPIRONE HYDROCHLORIDE 10 MG: 5 TABLET ORAL at 18:33

## 2019-03-29 RX ADMIN — Medication 1 AMPULE: at 21:16

## 2019-03-29 NOTE — ANESTHESIA PREPROCEDURE EVALUATION
Anesthetic History No history of anesthetic complications Review of Systems / Medical History Patient summary reviewed and pertinent labs reviewed Pulmonary Sleep apnea: No treatment Smoker (Former Quit 2018) Neuro/Psych Psychiatric history Cardiovascular Hypertension Exercise tolerance: >4 METS 
  
GI/Hepatic/Renal 
  
GERD Endo/Other Other Findings Comments: S/p Gastric Bypass CSF leak Last PO tea and toast @ 0830 Physical Exam 
 
Airway Mallampati: II 
TM Distance: 4 - 6 cm Neck ROM: normal range of motion Mouth opening: Normal 
 
 Cardiovascular Rhythm: regular Rate: normal 
 
 
 
 Dental 
No notable dental hx Pulmonary Breath sounds clear to auscultation Abdominal 
GI exam deferred Other Findings Anesthetic Plan ASA: 2, emergent Anesthesia type: general 
 
 
 
 
Induction: Intravenous and RSI Anesthetic plan and risks discussed with: Patient

## 2019-03-29 NOTE — PERIOP NOTES
TRANSFER - OUT REPORT: 
 
Verbal report given to Al, RN(name) on Estrada Hemphill  being transferred to [facility](unit) for routine post - op Report consisted of patients Situation, Background, Assessment and  
Recommendations(SBAR). Information from the following report(s) OR Summary, Procedure Summary, Intake/Output and MAR was reviewed with the receiving nurse. Lines:  
Peripheral IV 03/29/19 Left;Posterior Hand (Active) Site Assessment Clean, dry, & intact 3/29/2019  3:10 PM  
Phlebitis Assessment 0 3/29/2019  3:10 PM  
Infiltration Assessment 0 3/29/2019  3:10 PM  
Dressing Status Clean, dry, & intact 3/29/2019  3:10 PM  
Dressing Type Transparent;Tape 3/29/2019  3:10 PM  
Hub Color/Line Status Infusing;Green 3/29/2019  3:10 PM  
Action Taken Blood drawn 3/29/2019 12:29 PM  
Alcohol Cap Used No 3/29/2019  3:10 PM  
  
 
Opportunity for questions and clarification was provided. Patient transported with: 
 O2 @ 2 liters VTE prophylaxis orders have been written for Estrada Hemphill. Patient and family given floor number and nurses name. Family updated re: pt status after security code verified.

## 2019-03-29 NOTE — H&P
[address] [place] [address] 
HISTORY AND PHYSICAL Name:  Alli Baca 
MR#:  [id number] :  1960 ACCOUNT #:  [de-identified] ADMIT DATE:  2019 CHIEF COMPLAINT:  Spinal fluid leakage from lumbar incision x12 to 24 hours. HISTORY OF PRESENT ILLNESS:  A 63-year lady status post right L4-L5 laminectomy and facetectomy two weeks ago, today without any complications. She presented to the office yesterday for suture removal.  She was doing fine, had no signs of leakage or drainage. Her sutures were removed and she went home. This morning she woke up with positional headache and fluid leaking out of her back. She was seen in the office. It was determined that she had a CSF leak and was readmitted for surgical intervention. PAST MEDICAL HISTORY:  Significant for anxiety, essential hypertension, GERD, history of gastric bypass, depression, hyperlipidemia, and vitamin D deficiency. ALLERGIES:  PENICILLIN. SOCIAL HISTORY:  She is a nonsmoker. She is a minimal ethanol consumer. FAMILY HISTORY:  Positive for stroke, breast cancer, and hypertension. PHYSICAL EXAMINATION: 
HEENT:  Unremarkable. Nose and throat are clear. CHEST:  Clear bilaterally. CARDIAC:  Regular rate and rhythm. No murmurs or gallops. ABDOMEN:  Soft, benign, nontender. No masses. Bowel sounds positive. EXTREMITIES:  Free of deformities. NEUROLOGIC:  Awake, alert, and oriented x3. Cranial nerves II-XII intact. Dressings are currently dry on her back. Motor strength 5/5 in her legs. Reflexes symmetric. Sensation normal.  No pathological reflexes. IMPRESSION:  Postoperative cerebrospinal fluid leak. PLAN:  Lumbar laminectomy for repair of CSF leak. The risks were thoroughly explained and include bleeding, infection, weakness, numbness, CSF leak, meningitis, persistent pain, and persistent leakage. She understands and agrees to proceed.  
 
 
MD GRACIE [email or web address] 
 D: 03/29/2019 13:14 
T:  03/29/2019 13:18 
JOB #:  [id number]

## 2019-03-29 NOTE — BRIEF OP NOTE
BRIEF OPERATIVE NOTE Date of Procedure: 3/29/2019 Preoperative Diagnosis: CSF LEAK Postoperative Diagnosis: CSF LEAK Procedure(s): SPINE LUMBAR LAMINECTOMY FOR CSF LEAK Surgeon(s) and Role: 
   * Patricia Dior MD - Primary Surgical Assistant: NONE Surgical Staff: 
Circ-1: Jake Valencia RN Scrub Tech-1: [facility] Scrub Tech-2: Diogo Sierra Event Time In Time Out Incision Start 1426 Incision Close Anesthesia: General  
Estimated Blood Loss: MINIMAL Specimens: * No specimens in log * Findings: CSF  NO OBVIOUS HOLE Complications: NONE Implants:  
Implant Name Type Inv. Item Serial No.  Lot No. LRB No. Used Action GRAFT DURA MATRX RESRB 2X2 IN -- DURAGEN ORDER AS EA - [id number]  GRAFT DURA MATRX RESRB 2X2 IN -- DURAGEN ORDER AS EA  [employer] [facility] [id number] N/A 1 Implanted

## 2019-03-29 NOTE — PROGRESS NOTES
TRANSFER - IN REPORT: 
 
Verbal report received from (Jv Pisano RName) on Leon Palmira  being received from [place]) for routine post - op Report consisted of patients Situation, Background, Assessment and  
Recommendations(SBAR). Information from the following report(s) SBAR was reviewed with the receiving nurse. Opportunity for questions and clarification was provided. Assessment completed upon patients arrival to unit and care assumed.

## 2019-03-29 NOTE — PROGRESS NOTES
Primary Nurse Reno Modi RN and Joey Jimenes RN, RN performed a dual skin assessment on this patient No impairment noted Aristides score is 17

## 2019-03-30 PROCEDURE — 65270000029 HC RM PRIVATE

## 2019-03-30 PROCEDURE — 74011250637 HC RX REV CODE- 250/637: Performed by: NEUROLOGICAL SURGERY

## 2019-03-30 PROCEDURE — 74011250636 HC RX REV CODE- 250/636: Performed by: NEUROLOGICAL SURGERY

## 2019-03-30 PROCEDURE — 77010033678 HC OXYGEN DAILY

## 2019-03-30 PROCEDURE — 94760 N-INVAS EAR/PLS OXIMETRY 1: CPT

## 2019-03-30 RX ADMIN — DULOXETINE 30 MG: 30 CAPSULE, DELAYED RELEASE ORAL at 08:50

## 2019-03-30 RX ADMIN — TIZANIDINE 4 MG: 2 TABLET ORAL at 21:51

## 2019-03-30 RX ADMIN — VANCOMYCIN HYDROCHLORIDE 1000 MG: 1 INJECTION, POWDER, LYOPHILIZED, FOR SOLUTION INTRAVENOUS at 02:42

## 2019-03-30 RX ADMIN — DULOXETINE 30 MG: 30 CAPSULE, DELAYED RELEASE ORAL at 17:07

## 2019-03-30 RX ADMIN — Medication 5 ML: at 14:00

## 2019-03-30 RX ADMIN — Medication 10 ML: at 21:51

## 2019-03-30 RX ADMIN — Medication 1 AMPULE: at 21:51

## 2019-03-30 RX ADMIN — SIMVASTATIN 10 MG: 20 TABLET, FILM COATED ORAL at 21:51

## 2019-03-30 RX ADMIN — Medication 1 AMPULE: at 08:49

## 2019-03-30 RX ADMIN — OXYCODONE HYDROCHLORIDE AND ACETAMINOPHEN 1 TABLET: 10; 325 TABLET ORAL at 17:11

## 2019-03-30 RX ADMIN — HYDROMORPHONE HYDROCHLORIDE 1 MG: 1 INJECTION, SOLUTION INTRAMUSCULAR; INTRAVENOUS; SUBCUTANEOUS at 06:39

## 2019-03-30 RX ADMIN — ACETAMINOPHEN 650 MG: 325 TABLET, FILM COATED ORAL at 02:48

## 2019-03-30 RX ADMIN — BUSPIRONE HYDROCHLORIDE 10 MG: 5 TABLET ORAL at 08:49

## 2019-03-30 RX ADMIN — HYDROCHLOROTHIAZIDE 12.5 MG: 12.5 CAPSULE ORAL at 08:50

## 2019-03-30 RX ADMIN — OXYCODONE HYDROCHLORIDE AND ACETAMINOPHEN 1 TABLET: 10; 325 TABLET ORAL at 21:51

## 2019-03-30 RX ADMIN — PANTOPRAZOLE SODIUM 40 MG: 40 TABLET, DELAYED RELEASE ORAL at 06:34

## 2019-03-30 RX ADMIN — VANCOMYCIN HYDROCHLORIDE 1000 MG: 1 INJECTION, POWDER, LYOPHILIZED, FOR SOLUTION INTRAVENOUS at 13:55

## 2019-03-30 RX ADMIN — BUSPIRONE HYDROCHLORIDE 10 MG: 5 TABLET ORAL at 17:07

## 2019-03-30 NOTE — PROGRESS NOTES
Problem: Falls - Risk of 
Goal: *Absence of Falls Description Document Eliseo Blount Fall Risk and appropriate interventions in the flowsheet. 3/30/2019 0743 by Hunter Alejandro RN Outcome: Progressing Towards Goal 
3/30/2019 0738 by Hunter Alejandro RN Outcome: Progressing Towards Goal 
  
Problem: Patient Education: Go to Patient Education Activity Goal: Patient/Family Education 3/30/2019 0743 by Hunter Alejandro RN Outcome: Progressing Towards Goal 
3/30/2019 0738 by Hunter Alejandro RN Outcome: Progressing Towards Goal 
  
Problem: Pressure Injury - Risk of 
Goal: *Prevention of pressure injury Description Document Aristides Scale and appropriate interventions in the flowsheet. 3/30/2019 0743 by Hunter Alejandro RN Outcome: Progressing Towards Goal 
3/30/2019 0738 by Hunter Alejandro RN Outcome: Progressing Towards Goal 
  
Problem: Patient Education: Go to Patient Education Activity Goal: Patient/Family Education 3/30/2019 0743 by Hunter Alejandro RN Outcome: Progressing Towards Goal 
3/30/2019 0738 by Hunter Alejandro RN Outcome: Progressing Towards Goal

## 2019-03-30 NOTE — OP NOTES
[address] [facility]  OPERATIVE REPORT    Name:  Luz Hall  MR#:  [id number]  :  1960  ACCOUNT #:  [de-identified]  DATE OF SERVICE:  2019    PREOPERATIVE DIAGNOSIS:  Postoperative cerebrospinal fluid leak, lumbar spine. POSTOPERATIVE DIAGNOSIS:  Postoperative cerebrospinal fluid leak, lumbar spine. OPERATIONS AND PROCEDURES:  Right L4-L5 laminectomy for repair of CSF leak. SURGEON:  Mona Bansal MD    FIRST ASSISTANT:  None. ANESTHESIA:  General endotracheal.    ESTIMATED BLOOD LOSS:  Minimal.    PREPARATION:  ChloraPrep. COMPLICATIONS:  None. SPECIMENS:  None. IMPLANTS:  See below. HISTORY OF PRESENT ILLNESS:  A 53-year-old lady status post right L4-L5 laminectomy and facetectomy two weeks ago. She presented to the office yesterday for wound check and dressing change. Incision looked fine. She was feeling well. This morning, she woke up with severe headache and leakage out of the back of her incision. She was seen in the office and was determined to have a postoperative CSF leak and was directly admitted for surgery. OPERATIVE NOTE:  The patient was brought to the operative and was carefully placed under general endotracheal anesthesia without complications, carefully turned prone on the Cloward frame. The posterior aspect of back was shaved and prepped in the usual sterile fashion. The previous incision was outlined. Under sterile conditions, it was incised down to the fascia and CSF was noted clear in nature. Three fascial sutures were removed with Metzenbaum scissors and arterial hemostat. Deep retractors were placed. The dura was inspected. No obvious signs of tear or hole were present. The patient received two positive pressure of breaths without obvious CSF leakage noted, although ventral dura was not visualized. At this point, the wound was irrigated to clear.   Three small pieces of Duragen were placed over the exposed dura and dura seal was used to seal this tightly. Positive pressure breathing revealed no signs of CSF leak. At this point, the wound was copiously irrigated until clear. No signs of infection or bleeding were noted. The wound was dried and it was closed. The fascia was closed tightly with interrupted 0 Vicryl. Subcutaneous tissues were closed with #0 Vicryl. The skin was closed with 3-0 nylon suture. Sterile dressings were placed. The patient tolerated the procedure well, was turned supine, awakened, extubated and taken to PACU in stable condition. The were no obvious complications.       MD GRACIE [email or web address]  D:  03/29/2019 14:56  T:  03/29/2019 14:59  JOB #:  [id number]

## 2019-03-30 NOTE — PROGRESS NOTES
Problem: Falls - Risk of 
Goal: *Absence of Falls Description Document Shadia Amaya Fall Risk and appropriate interventions in the flowsheet. Outcome: Progressing Towards Goal 
  
Problem: Patient Education: Go to Patient Education Activity Goal: Patient/Family Education Outcome: Progressing Towards Goal 
  
Problem: Pressure Injury - Risk of 
Goal: *Prevention of pressure injury Description Document Aristides Scale and appropriate interventions in the flowsheet. Outcome: Progressing Towards Goal 
  
Problem: Patient Education: Go to Patient Education Activity Goal: Patient/Family Education Outcome: Progressing Towards Goal

## 2019-03-30 NOTE — ANESTHESIA POSTPROCEDURE EVALUATION
Procedure(s): SPINE LUMBAR LAMINECTOMY FOR CSF LEAK. general 
 
Anesthesia Post Evaluation Multimodal analgesia: multimodal analgesia used between 6 hours prior to anesthesia start to PACU discharge Patient location during evaluation: bedside Patient participation: complete - patient participated Level of consciousness: awake and responsive to light touch Pain management: adequate Airway patency: patent Anesthetic complications: no 
Cardiovascular status: acceptable, hemodynamically stable, blood pressure returned to baseline and stable Respiratory status: acceptable, unassisted, spontaneous ventilation and nonlabored ventilation Hydration status: acceptable Post anesthesia nausea and vomiting:  controlled Vitals Value Taken Time /76 3/29/2019  3:39 PM  
Temp 36.9 °C (98.5 °F) 3/29/2019  3:45 PM  
Pulse 88 3/29/2019  3:50 PM  
Resp 14 3/29/2019  3:45 PM  
SpO2 98 % 3/29/2019  3:49 PM  
Vitals shown include unvalidated device data.

## 2019-03-30 NOTE — PROGRESS NOTES
NEUROSURGERY PROGRESS NOTE:  
Admit Date: 3/29/2019 Subjective: No acute overnight events. Patient sat up this morning as she thought she was allowed. She denies any headaches and we place her head of bed back flat for today. Objective: 
Visit Vitals /71 (BP 1 Location: Right arm, BP Patient Position: At rest) Pulse 86 Temp 98.2 °F (36.8 °C) Resp 18 Ht 5' 3\" (1.6 m) Wt 154 lb (69.9 kg) SpO2 96% BMI 27.28 kg/m² General: No acute distress Awake, alert, and oriented to person, place, time, and situation Eyes open spontaneously Patient with 5/5 strength in the bilateral upper and bilateral lower extremities Lumbar dressing clean, dry, and intact Assessment and Plan:  
Brayden Cr 60 y.o. female who is now 1 Day Post-Op from a lumbar re-exploration for cerebrospinal fluid leak repair. She has done well and denies any headaches. She has a mild soreness in her left ankle. - Continue routine floor care - Continue PO and IV PRN pain medications - Continue regular diet  
- Continue mehta for now - Continue continue flat for today will consider allowing to elevate head of bed tomorrow or Monday pending patient performance. - Please call with questions or concerns.   
 
Sean Skiff, MD

## 2019-03-31 PROCEDURE — 65270000029 HC RM PRIVATE

## 2019-03-31 PROCEDURE — 74011250636 HC RX REV CODE- 250/636: Performed by: NEUROLOGICAL SURGERY

## 2019-03-31 PROCEDURE — 74011250637 HC RX REV CODE- 250/637: Performed by: NEUROLOGICAL SURGERY

## 2019-03-31 PROCEDURE — 77030020255 HC SOL INJ LR 1000ML BG

## 2019-03-31 PROCEDURE — 77030020263 HC SOL INJ SOD CL0.9% LFCR 1000ML

## 2019-03-31 RX ADMIN — BUSPIRONE HYDROCHLORIDE 10 MG: 5 TABLET ORAL at 08:59

## 2019-03-31 RX ADMIN — DULOXETINE 30 MG: 30 CAPSULE, DELAYED RELEASE ORAL at 08:59

## 2019-03-31 RX ADMIN — Medication 1 AMPULE: at 22:11

## 2019-03-31 RX ADMIN — BUSPIRONE HYDROCHLORIDE 10 MG: 5 TABLET ORAL at 17:18

## 2019-03-31 RX ADMIN — TIZANIDINE 4 MG: 2 TABLET ORAL at 22:10

## 2019-03-31 RX ADMIN — HYDROCHLOROTHIAZIDE 12.5 MG: 12.5 CAPSULE ORAL at 08:59

## 2019-03-31 RX ADMIN — Medication 1 AMPULE: at 08:59

## 2019-03-31 RX ADMIN — DULOXETINE 30 MG: 30 CAPSULE, DELAYED RELEASE ORAL at 17:18

## 2019-03-31 RX ADMIN — OXYCODONE HYDROCHLORIDE AND ACETAMINOPHEN 1 TABLET: 10; 325 TABLET ORAL at 09:03

## 2019-03-31 RX ADMIN — OXYCODONE HYDROCHLORIDE AND ACETAMINOPHEN 1 TABLET: 10; 325 TABLET ORAL at 22:12

## 2019-03-31 RX ADMIN — ACETAMINOPHEN 650 MG: 325 TABLET, FILM COATED ORAL at 03:07

## 2019-03-31 RX ADMIN — SODIUM CHLORIDE, SODIUM LACTATE, POTASSIUM CHLORIDE, AND CALCIUM CHLORIDE 75 ML/HR: 600; 310; 30; 20 INJECTION, SOLUTION INTRAVENOUS at 05:52

## 2019-03-31 RX ADMIN — Medication 5 ML: at 22:00

## 2019-03-31 RX ADMIN — SIMVASTATIN 10 MG: 20 TABLET, FILM COATED ORAL at 22:10

## 2019-03-31 RX ADMIN — Medication 10 ML: at 06:09

## 2019-03-31 RX ADMIN — HYDROMORPHONE HYDROCHLORIDE 1 MG: 1 INJECTION, SOLUTION INTRAMUSCULAR; INTRAVENOUS; SUBCUTANEOUS at 05:58

## 2019-03-31 RX ADMIN — OXYCODONE HYDROCHLORIDE AND ACETAMINOPHEN 1 TABLET: 10; 325 TABLET ORAL at 15:41

## 2019-03-31 RX ADMIN — Medication 5 ML: at 15:41

## 2019-03-31 RX ADMIN — PANTOPRAZOLE SODIUM 40 MG: 40 TABLET, DELAYED RELEASE ORAL at 05:52

## 2019-03-31 NOTE — PROGRESS NOTES
NEUROSURGERY PROGRESS NOTE:  
Admit Date: 3/29/2019 Subjective: No acute overnight events. Patient endorses positional headache that occurred when she set up slightly for breakfast this am. She states the headache is resolved since she went completely flat again. Denies any drainage from her incision. Objective: 
Visit Vitals /67 (BP 1 Location: Right arm, BP Patient Position: At rest) Pulse 67 Temp 98.1 °F (36.7 °C) Resp 18 Ht 5' 3\" (1.6 m) Wt 154 lb (69.9 kg) SpO2 94% BMI 27.28 kg/m² General: No acute distress Awake, alert, and oriented to person, place, time, and situation Eyes open spontaneously Patient with 5/5 strength in the bilateral upper and bilateral lower extremities Lumbar dressing clean, dry, and intact Assessment and Plan:  
Paloma Marrufo 60 y.o. female who is now 2 Days Post-Op from a lumbar re-exploration for cerebrospinal fluid leak repair. She has a headache again this am that is positional. Recommended that she continue to remain flat for today. - Continue routine floor care - Continue PO and IV PRN pain medications - Continue regular diet  
- Continue mehta for now - Continue continue flat for today will consider allowing to elevate head of bed Monday morning pending patient performance. - Please call with questions or concerns.   
 
Sarah Maloney MD

## 2019-03-31 NOTE — PROGRESS NOTES
Problem: Falls - Risk of 
Goal: *Absence of Falls Description Document Lorri Tamayo Fall Risk and appropriate interventions in the flowsheet. Outcome: Progressing Towards Goal 
  
Problem: Patient Education: Go to Patient Education Activity Goal: Patient/Family Education Outcome: Progressing Towards Goal 
  
Problem: Pressure Injury - Risk of 
Goal: *Prevention of pressure injury Description Document Aristides Scale and appropriate interventions in the flowsheet. Outcome: Progressing Towards Goal 
  
Problem: Patient Education: Go to Patient Education Activity Goal: Patient/Family Education Outcome: Progressing Towards Goal

## 2019-04-01 LAB
GLUCOSE BLD STRIP.AUTO-MCNC: 105 MG/DL (ref 65–100)
GLUCOSE BLD STRIP.AUTO-MCNC: 107 MG/DL (ref 65–100)
GLUCOSE BLD STRIP.AUTO-MCNC: 121 MG/DL (ref 65–100)
GLUCOSE BLD STRIP.AUTO-MCNC: 94 MG/DL (ref 65–100)

## 2019-04-01 PROCEDURE — 65270000029 HC RM PRIVATE

## 2019-04-01 PROCEDURE — 74011250637 HC RX REV CODE- 250/637: Performed by: NEUROLOGICAL SURGERY

## 2019-04-01 PROCEDURE — 97530 THERAPEUTIC ACTIVITIES: CPT

## 2019-04-01 PROCEDURE — 82962 GLUCOSE BLOOD TEST: CPT

## 2019-04-01 PROCEDURE — 97161 PT EVAL LOW COMPLEX 20 MIN: CPT

## 2019-04-01 PROCEDURE — 74011250636 HC RX REV CODE- 250/636: Performed by: NEUROLOGICAL SURGERY

## 2019-04-01 RX ORDER — BUTALBITAL, ACETAMINOPHEN AND CAFFEINE 50; 325; 40 MG/1; MG/1; MG/1
1 TABLET ORAL 4 TIMES DAILY
Status: DISCONTINUED | OUTPATIENT
Start: 2019-04-01 | End: 2019-04-02 | Stop reason: HOSPADM

## 2019-04-01 RX ADMIN — Medication 5 ML: at 22:11

## 2019-04-01 RX ADMIN — Medication 1 AMPULE: at 22:10

## 2019-04-01 RX ADMIN — Medication 1 AMPULE: at 08:05

## 2019-04-01 RX ADMIN — Medication 5 ML: at 13:18

## 2019-04-01 RX ADMIN — HYDROCHLOROTHIAZIDE 12.5 MG: 12.5 CAPSULE ORAL at 08:05

## 2019-04-01 RX ADMIN — PANTOPRAZOLE SODIUM 40 MG: 40 TABLET, DELAYED RELEASE ORAL at 05:31

## 2019-04-01 RX ADMIN — DULOXETINE 30 MG: 30 CAPSULE, DELAYED RELEASE ORAL at 08:05

## 2019-04-01 RX ADMIN — DULOXETINE 30 MG: 30 CAPSULE, DELAYED RELEASE ORAL at 18:28

## 2019-04-01 RX ADMIN — BUSPIRONE HYDROCHLORIDE 10 MG: 5 TABLET ORAL at 18:28

## 2019-04-01 RX ADMIN — BUTALBITAL, ACETAMINOPHEN AND CAFFEINE 1 TABLET: 50; 325; 40 TABLET ORAL at 18:28

## 2019-04-01 RX ADMIN — BUTALBITAL, ACETAMINOPHEN AND CAFFEINE 1 TABLET: 50; 325; 40 TABLET ORAL at 22:10

## 2019-04-01 RX ADMIN — BUTALBITAL, ACETAMINOPHEN AND CAFFEINE 1 TABLET: 50; 325; 40 TABLET ORAL at 08:05

## 2019-04-01 RX ADMIN — BUTALBITAL, ACETAMINOPHEN AND CAFFEINE 1 TABLET: 50; 325; 40 TABLET ORAL at 13:17

## 2019-04-01 RX ADMIN — SIMVASTATIN 10 MG: 20 TABLET, FILM COATED ORAL at 22:10

## 2019-04-01 RX ADMIN — Medication 5 ML: at 05:34

## 2019-04-01 RX ADMIN — OXYCODONE HYDROCHLORIDE AND ACETAMINOPHEN 1 TABLET: 10; 325 TABLET ORAL at 04:28

## 2019-04-01 RX ADMIN — BUSPIRONE HYDROCHLORIDE 10 MG: 5 TABLET ORAL at 08:05

## 2019-04-01 RX ADMIN — HYDROMORPHONE HYDROCHLORIDE 1 MG: 1 INJECTION, SOLUTION INTRAMUSCULAR; INTRAVENOUS; SUBCUTANEOUS at 01:23

## 2019-04-01 RX ADMIN — ERGOCALCIFEROL 50000 UNITS: 1.25 CAPSULE ORAL at 06:10

## 2019-04-01 RX ADMIN — TIZANIDINE 4 MG: 2 TABLET ORAL at 22:10

## 2019-04-01 NOTE — PROGRESS NOTES
MS  POD#3 AFEBRILE 
C/O  HA   NO FEVERS 
DRY DRESSINGS MOVES LEGS WELL 
A/P  PT 
MOBILIZE 
RX  Sancho Guevara MD

## 2019-04-01 NOTE — INTERDISCIPLINARY ROUNDS
Interdisciplinary team rounds were held 4/1/2019 with the following team members:Care Management, Nursing and Occupational Therapy. Plan of care discussed. See clinical pathway and/or care plan for interventions and desired outcomes. Plan to discharge home when medically ready, possible home health.

## 2019-04-01 NOTE — PROGRESS NOTES
Initial visit to assess pt's spiritual needs. Feeling today? Good, hopes to discharge to home tomorrow Receiving good care? yes Needs from Spiritual Care:  prayer Ministry of presence & prayer to demonstrate caring & concern, convey emotional & spiritual support.  
 
Chaplain Katerine Yee, MDiv,[facility],PhD

## 2019-04-01 NOTE — PROGRESS NOTES
Problem: Mobility Impaired (Adult and Pediatric) Goal: *Acute Goals and Plan of Care (Insert Text) Description LTG: 
(1.)Ms. Halle Christian will move from supine to sit and sit to supine , scoot up and down and roll side to side with INDEPENDENT within 7 treatment day(s) through log rolling.   
(2.)Ms. Halle Christian will transfer from bed to chair and chair to bed with MODIFIED INDEPENDENCE using the least restrictive device within 7 treatment day(s). (3.)Ms. Halle Christian will ambulate with MODIFIED INDEPENDENCE for 500+ feet with the least restrictive device within 7 treatment day(s) while maintaining normal vital signs. ______________________________________________________________________________________________ Outcome: Progressing Towards Goal 
  
PHYSICAL THERAPY: Daily Note and PM 4/1/2019 INPATIENT: PT Visit Days : 1 Payor: [employer] / Plan: [place] BLUE CROSS [facility] / Product Type: PPO /   
  
NAME/AGE/GENDER: Brayden Cr is a 60 y.o. female PRIMARY DIAGNOSIS: CSF leak [G96.0] CSF leak CSF leak Procedure(s) (LRB): SPINE LUMBAR LAMINECTOMY FOR CSF LEAK (N/A) 3 Days Post-Op ICD-10: Treatment Diagnosis: · Difficulty in walking, Not elsewhere classified (R26.2) Precaution/Allergies: 
Penicillins ASSESSMENT:  
Ms. Halle Christian presents with decreased bed mobility, transfers, ambulation, balance, activity tolerance, and overall general functional mobility s/p hospital admission with CSF leak from previous L4-5 laminectomy s/p 2 weeks. Pt now seen post CSF leak laminectomy on 3/29/19, cleared by RN for mobility. Pt reports no pain presently, A & O x 4, on room air, ready to mobilize. Pt states has been doing well in past 2 weeks at home, ambulating without assistive device, minimal assist with ADLs. Pt has boyfriend that assists as needed. Pt states told no lifting over 5 pounds x 1 year s/p CSF leak.  Pt verbalized spinal precautions, SBA for bed mobility. Pt required MIN A initially for sit to stand, unsteady on feet, recommended use of RW initially for balance. Pt ambulated with RW 5' to bathroom, CGA for transfers, independent in toileting needs. Pt then ambulated into hallway with RW for 250', very slow viri, encouraged for increased step length and speed. Pt cautious with mobility, appears to be leaning on walker for increased support, states they have one at home she can use as needed. PT to follow for acute care needs to address deficits noted above. Pt may benefit from further skilled PT services at discharge to follow up for safety and mobility in the home. Pm note: Pt agreeable to mobility, in supine on arrival. Pt states did well up in chair. Pt with improved bed mobility this afternoon, less assist required. Pt amb with RW to bathroom 10' in room, SBA to CGA, independent in toileting needs. Pt demo improved static standing balance. Pt continues to states numbness in R foot bothersome. Pt feels more comfortable using walker at this time, states has one to borrow at home. Pt ambulated into hallway for 250' with RW and SBA, improved viri, still tentative with R foot placement at times. PT to cont to follow for acute care needs. Pt making good progress with mobility. Encouraged to to ambulate again later with family as able and RN ok with mobility. This section established at most recent assessment PROBLEM LIST (Impairments causing functional limitations): 1. Decreased Strength 2. Decreased ADL/Functional Activities 3. Decreased Transfer Abilities 4. Decreased Ambulation Ability/Technique 5. Decreased Balance 6. Decreased Activity Tolerance 7. Decreased [place] with Home Exercise Program 
 INTERVENTIONS PLANNED: (Benefits and precautions of physical therapy have been discussed with the patient.) 1. Balance Exercise 2. Bed Mobility 3. Family Education 4. Gait Training 5. Home Exercise Program (HEP) 6. Therapeutic Activites 7. Therapeutic Exercise/Strengthening 8. Transfer Training TREATMENT PLAN: Frequency/Duration: twice daily for duration of hospital stay Rehabilitation Potential For Stated Goals: Good RECOMMENDED REHABILITATION/EQUIPMENT: (at time of discharge pending progress): Due to the probability of continued deficits (see above) this patient will likely need continued skilled physical therapy after discharge. Equipment:  
? Possibly RW   
    
 
 
 
HISTORY:  
History of Present Injury/Illness (Reason for Referral): A 58-year lady status post right L4-L5 laminectomy and facetectomy two weeks ago, today without any complications. She presented to the office yesterday for suture removal.  She was doing fine, had no signs of leakage or drainage. Her sutures were removed and she went home. This morning she woke up with positional headache and fluid leaking out of her back. She was seen in the office. It was determined that she had a CSF leak and was readmitted for surgical intervention Past Medical History/Comorbidities: Ms. Katina Merritt  has a past medical history of Anxiety, Back problem, Depression, Ex-smoker for less than 1 year, Family history of anesthesia complication, GERD (gastroesophageal reflux disease), History of mammogram (2014), History of Papanicolaou smear of cervix (>5 years aog), Hyperlipidemia, Hypertension, Nicotine vapor product user, and Sleep apnea. Ms. Katina Merritt  has a past surgical history that includes hx lap cholecystectomy (~1999); hx breast augmentation (2014); hx gastric bypass (2014); hx milka and bso; and hx lumbar laminectomy (03/15/2019). Social History/Living Environment:  
Home Environment: Private residence One/Two Story Residence: One story Living Alone: No 
Support Systems: Friends \ neighbors, Spouse/Significant Other/Partner Patient Expects to be Discharged to[de-identified] Private residence Current DME Used/Available at Home: None Tub or Shower Type: Shower Prior Level of Function/Work/Activity: 
Lives with boyfriend, indep ambulation post surgery, minimal assist ADLs, worked prior to initial surgery. Personal Factors:   
      Sex:  female Age:  60 y.o. Overall Behavior:  agreeable Number of Personal Factors/Comorbidities that affect the Plan of Care: 
HTN, multiple back surgeries 1-2: MODERATE COMPLEXITY EXAMINATION:  
Most Recent Physical Functioning:  
Gross Assessment: 
AROM: Within functional limits(B LE) Strength: Generally decreased, functional(B LE grossly 3+/5) Coordination: Generally decreased, functional 
Sensation: Impaired(R LE, knee distally, lateral into dorsum of foot) Posture: 
Posture (WDL): Within defined limits Balance: 
Sitting: Intact Standing: Impaired Standing - Static: Good Standing - Dynamic : Fair Bed Mobility: 
Rolling: Stand-by assistance Supine to Sit: Stand-by assistance Sit to Supine: Stand-by assistance Scooting: Stand-by assistance Wheelchair Mobility: 
  
Transfers: 
Sit to Stand: Stand-by assistance;Contact guard assistance Stand to Sit: Stand-by assistance Bed to Chair: Contact guard assistance Gait: 
  
Base of Support: Narrowed Speed/Isidra: Slow Step Length: Left shortened;Right shortened Swing Pattern: Left symmetrical;Right symmetrical 
Gait Abnormalities: Decreased step clearance Distance (ft): 250 Feet (ft) Assistive Device: Walker, rolling Ambulation - Level of Assistance: Stand-by assistance;Contact guard assistance Interventions: Safety awareness training;Verbal cues Body Structures Involved: 1. Muscles Body Functions Affected: 1. Movement Related Activities and Participation Affected: 1. General Tasks and Demands 2. Mobility 3. Self Care Number of elements that affect the Plan of Care: 4+: HIGH COMPLEXITY CLINICAL PRESENTATION:  
Presentation: Evolving clinical presentation with changing clinical characteristics: MODERATE COMPLEXITY CLINICAL DECISION MAKIN[address] [place] [id number] AM-PAC 6 Clicks Basic Mobility Inpatient Short Form How much difficulty does the patient currently have. .. Unable A Lot A Little None 1. Turning over in bed (including adjusting bedclothes, sheets and blankets)? ? 1   ? 2   ? 3   ? 4  
2. Sitting down on and standing up from a chair with arms ( e.g., wheelchair, bedside commode, etc.)   ? 1   ? 2   ? 3   ? 4  
3. Moving from lying on back to sitting on the side of the bed?   ? 1   ? 2   ? 3   ? 4 How much help from another person does the patient currently need. .. Total A Lot A Little None 4. Moving to and from a bed to a chair (including a wheelchair)? ? 1   ? 2   ? 3   ? 4  
5. Need to walk in hospital room? ? 1   ? 2   ? 3   ? 4  
6. Climbing 3-5 steps with a railing? ? 1   ? 2   ? 3   ? 4  
© , Trustees of [address] [place] [id number], under license to [employer]. All rights reserved Score:  Initial: 19 Most Recent: X (Date: -- ) Interpretation of Tool:  Represents activities that are increasingly more difficult (i.e. Bed mobility, Transfers, Gait). Medical Necessity:    
· Patient is expected to demonstrate progress in strength, range of motion, balance and coordination ·  to decrease assistance required with overall functional mobility, transfers, ambulation · . · Patient demonstrates good ·  rehab potential due to higher previous functional level. Reason for Services/Other Comments: 
· Patient continues to require present interventions due to patient's inability to perform transfers, ambulation safely and effectively at prior level of function of independence. · . Use of outcome tool(s) and clinical judgement create a POC that gives a: Clear prediction of patient's progress: LOW COMPLEXITY  
  
 
 
 
TREATMENT:  
(In addition to Assessment/Re-Assessment sessions the following treatments were rendered) Pre-treatment Symptoms/Complaints:  \"I am doing well\" Pain: Initial:  
Pain Intensity 1: 0  Post Session:  0/10 post mobility Therapeutic Activity: (    23 min): Therapeutic activities including Bed transfers, Toilet transfers, Ambulation on level ground, walker safety, posture, viri  to improve mobility, strength, balance and coordination. Required minimal Safety awareness training;Verbal cues to promote static and dynamic balance in standing and promote coordination of bilateral, lower extremity(s). Overall doing well with activity. Braces/Orthotics/Lines/Etc:  
· O2 Device: Room air Treatment/Session Assessment:   
· Response to Treatment:  tolerated well · Interdisciplinary Collaboration:  
o Physical Therapist 
o Registered Nurse · After treatment position/precautions:  
o Supine in bed 
o Bed/Chair-wheels locked 
o Bed in low position 
o Call light within reach 
o Visitors at bedside · Compliance with Program/Exercises: Will assess as treatment progresses · Recommendations/Intent for next treatment session: \"Next visit will focus on advancements to more challenging activities\". Total Treatment Duration: PT Patient Time In/Time Out Time In: 8320 Time Out: 1346 Lorie Beckford, PT

## 2019-04-01 NOTE — PROGRESS NOTES
Problem: Mobility Impaired (Adult and Pediatric) Goal: *Acute Goals and Plan of Care (Insert Text) Description LTG: 
(1.)Ms. Jody Gil will move from supine to sit and sit to supine , scoot up and down and roll side to side with INDEPENDENT within 7 treatment day(s) through log rolling.   
(2.)Ms. Jody Gil will transfer from bed to chair and chair to bed with MODIFIED INDEPENDENCE using the least restrictive device within 7 treatment day(s). (3.)Ms. Jody Gil will ambulate with MODIFIED INDEPENDENCE for 500+ feet with the least restrictive device within 7 treatment day(s) while maintaining normal vital signs. ______________________________________________________________________________________________ Outcome: Progressing Towards Goal 
  
PHYSICAL THERAPY: Initial Assessment and AM 4/1/2019 INPATIENT: PT Visit Days : 1 Payor: [employer] / Plan: [place] BLUE CROSS [facility] / Product Type: PPO /   
  
NAME/AGE/GENDER: Scooter Hernandez is a 60 y.o. female PRIMARY DIAGNOSIS: CSF leak [G96.0] CSF leak CSF leak Procedure(s) (LRB): SPINE LUMBAR LAMINECTOMY FOR CSF LEAK (N/A) 3 Days Post-Op ICD-10: Treatment Diagnosis:  
 Difficulty in walking, Not elsewhere classified (R26.2) Precaution/Allergies: 
Penicillins ASSESSMENT:  
 
Ms. Jody Gil presents with decreased bed mobility, transfers, ambulation, balance, activity tolerance, and overall general functional mobility s/p hospital admission with CSF leak from previous L4-5 laminectomy s/p 2 weeks. Pt now seen post CSF leak laminectomy on 3/29/19, cleared by RN for mobility. Pt reports no pain presently, A & O x 4, on room air, ready to mobilize. Pt states has been doing well in past 2 weeks at home, ambulating without assistive device, minimal assist with ADLs. Pt has boyfriend that assists as needed. Pt states told no lifting over 5 pounds x 1 year s/p CSF leak.  Pt verbalized spinal precautions, SBA for bed mobility. Pt required MIN A initially for sit to stand, unsteady on feet, recommended use of RW initially for balance. Pt ambulated with RW 5' to bathroom, CGA for transfers, independent in toileting needs. Pt then ambulated into hallway with RW for 250', very slow viri, encouraged for increased step length and speed. Pt cautious with mobility, appears to be leaning on walker for increased support, states they have one at home she can use as needed. PT to follow for acute care needs to address deficits noted above. Pt may benefit from further skilled PT services at discharge to follow up for safety and mobility in the home. This section established at most recent assessment PROBLEM LIST (Impairments causing functional limitations): 
Decreased Strength Decreased ADL/Functional Activities Decreased Transfer Abilities Decreased Ambulation Ability/Technique Decreased Balance Decreased Activity Tolerance Decreased [place] with Home Exercise Program 
 INTERVENTIONS PLANNED: (Benefits and precautions of physical therapy have been discussed with the patient.) Balance Exercise Bed Mobility Family Education Gait Training Home Exercise Program (HEP) Therapeutic Activites Therapeutic Exercise/Strengthening Transfer Training TREATMENT PLAN: Frequency/Duration: twice daily for duration of hospital stay Rehabilitation Potential For Stated Goals: Good RECOMMENDED REHABILITATION/EQUIPMENT: (at time of discharge pending progress): Due to the probability of continued deficits (see above) this patient will likely need continued skilled physical therapy after discharge. Equipment:  
Possibly RW   
    
 
 
 
HISTORY:  
History of Present Injury/Illness (Reason for Referral): A 58-year lady status post right L4-L5 laminectomy and facetectomy two weeks ago, today without any complications.   She presented to the office yesterday for suture removal.  She was doing fine, had no signs of leakage or drainage. Her sutures were removed and she went home. This morning she woke up with positional headache and fluid leaking out of her back. She was seen in the office. It was determined that she had a CSF leak and was readmitted for surgical intervention Past Medical History/Comorbidities: Ms. Marjorie Patel  has a past medical history of Anxiety, Back problem, Depression, Ex-smoker for less than 1 year, Family history of anesthesia complication, GERD (gastroesophageal reflux disease), History of mammogram (2014), History of Papanicolaou smear of cervix (>5 years aog), Hyperlipidemia, Hypertension, Nicotine vapor product user, and Sleep apnea. Ms. Marjorie Patel  has a past surgical history that includes hx lap cholecystectomy (~1999); hx breast augmentation (2014); hx gastric bypass (2014); hx milka and bso; and hx lumbar laminectomy (03/15/2019). Social History/Living Environment:  
Home Environment: Private residence One/Two Story Residence: One story Living Alone: No 
Support Systems: Friends \ neighbors, Spouse/Significant Other/Partner Patient Expects to be Discharged to[de-identified] Private residence Current DME Used/Available at Home: None Tub or Shower Type: Shower Prior Level of Function/Work/Activity: 
Lives with boyfriend, indep ambulation post surgery, minimal assist ADLs, worked prior to initial surgery. Personal Factors:   
      Sex:  female Age:  60 y.o. Overall Behavior:  agreeable Number of Personal Factors/Comorbidities that affect the Plan of Care: 
HTN, multiple back surgeries 1-2: MODERATE COMPLEXITY EXAMINATION:  
Most Recent Physical Functioning:  
Gross Assessment: 
AROM: Within functional limits(B LE) Strength: Generally decreased, functional(B LE grossly 3+/5) Coordination: Generally decreased, functional 
Sensation: Impaired(R LE, knee distally, lateral into dorsum of foot) Posture: 
Posture (WDL): Within defined limits Balance: 
Sitting: Intact Standing: Impaired Standing - Static: Good;Fair(with RW) Standing - Dynamic : Fair(with RW) Bed Mobility: 
Rolling: Stand-by assistance Supine to Sit: Stand-by assistance Sit to Supine: (in chair) Scooting: Stand-by assistance Wheelchair Mobility: 
  
Transfers: 
Sit to Stand: Contact guard assistance;Minimum assistance Stand to Sit: Contact guard assistance Bed to Chair: Contact guard assistance Gait: 
  
Base of Support: Narrowed Speed/Viri: Slow Step Length: Left shortened;Right shortened Swing Pattern: Left symmetrical;Right symmetrical 
Gait Abnormalities: Decreased step clearance(very slow viri) Distance (ft): 250 Feet (ft) Assistive Device: Walker, rolling Ambulation - Level of Assistance: Contact guard assistance;Minimal assistance Interventions: Safety awareness training;Verbal cues Body Structures Involved: Muscles Body Functions Affected: Movement Related Activities and Participation Affected: 
General Tasks and Demands Mobility Self Care Number of elements that affect the Plan of Care: 4+: HIGH COMPLEXITY CLINICAL PRESENTATION:  
Presentation: Evolving clinical presentation with changing clinical characteristics: MODERATE COMPLEXITY CLINICAL DECISION MAKING:  
[employer] AM-PAC? ?6 Clicks? Basic Mobility Inpatient Short Form How much difficulty does the patient currently have. .. Unable A Lot A Little None 1. Turning over in bed (including adjusting bedclothes, sheets and blankets)? ? 1   ? 2   ? 3   ? 4  
2. Sitting down on and standing up from a chair with arms ( e.g., wheelchair, bedside commode, etc.)   ? 1   ? 2   ? 3   ? 4  
3. Moving from lying on back to sitting on the side of the bed?   ? 1   ? 2   ? 3   ? 4 How much help from another person does the patient currently need. .. Total A Lot A Little None 4. Moving to and from a bed to a chair (including a wheelchair)?    ? 1   ? 2   ? 3   ? 4  
 5.  Need to walk in hospital room? ? 1   ? 2   ? 3   ? 4  
6. Climbing 3-5 steps with a railing? ? 1   ? 2   ? 3   ? 4  
© 2007, Trustees of [address] [place] [address] [address] [id number], under license to [employer]. All rights reserved Score:  Initial: 19 Most Recent: X (Date: -- ) Interpretation of Tool:  Represents activities that are increasingly more difficult (i.e. Bed mobility, Transfers, Gait). Medical Necessity:    
Patient is expected to demonstrate progress in strength, range of motion, balance and coordination 
 to decrease assistance required with overall functional mobility, transfers, ambulation [facility] Patient demonstrates good 
 rehab potential due to higher previous functional level. Reason for Services/Other Comments: 
Patient continues to require present interventions due to patient's inability to perform transfers, ambulation safely and effectively at prior level of function of independence. .  
Use of outcome tool(s) and clinical judgement create a POC that gives a: Clear prediction of patient's progress: LOW COMPLEXITY  
  
 
 
 
TREATMENT:  
(In addition to Assessment/Re-Assessment sessions the following treatments were rendered) Pre-treatment Symptoms/Complaints:  \"I am so ready to walk\" Pain: Initial:  
Pain Intensity 1: 0  Post Session:  0/10 post mobility in chair Therapeutic Activity: (    10 min): Therapeutic activities including Bed transfers, Toilet transfers, Ambulation on level ground and walker safety, posture, viri  to improve mobility, strength, balance and coordination. Required minimal Safety awareness training;Verbal cues to promote static and dynamic balance in standing and promote coordination of bilateral, lower extremity(s). Braces/Orthotics/Lines/Etc:  
O2 Device: Room air Treatment/Session Assessment:   
Response to Treatment:  tolerated well Interdisciplinary Collaboration:  
Physical Therapist 
Registered Nurse After treatment position/precautions: Up in chair Bed in low position Call light within reach RN notified Compliance with Program/Exercises: Will assess as treatment progresses Recommendations/Intent for next treatment session: \"Next visit will focus on advancements to more challenging activities\". Total Treatment Duration: PT Patient Time In/Time Out Time In: 1115 Time Out: 1140 Khurram Jackson PT

## 2019-04-01 NOTE — PROGRESS NOTES
Problem: Falls - Risk of 
Goal: *Absence of Falls Description Document Hank Moctezuma Fall Risk and appropriate interventions in the flowsheet. Outcome: Progressing Towards Goal 
  
Problem: Patient Education: Go to Patient Education Activity Goal: Patient/Family Education Outcome: Progressing Towards Goal 
  
Problem: Pressure Injury - Risk of 
Goal: *Prevention of pressure injury Description Document Aristides Scale and appropriate interventions in the flowsheet. Outcome: Progressing Towards Goal 
  
Problem: Patient Education: Go to Patient Education Activity Goal: Patient/Family Education Outcome: Progressing Towards Goal 
  
Problem: Pain Goal: *Control of Pain Outcome: Progressing Towards Goal 
Goal: *PALLIATIVE CARE:  Alleviation of Pain Outcome: Progressing Towards Goal 
  
Problem: Patient Education: Go to Patient Education Activity Goal: Patient/Family Education Outcome: Progressing Towards Goal

## 2019-04-01 NOTE — PHYSICIAN ADVISORY
Letter of Determination: Upgrade from Observation to Inpatient Status This patient was originally hospitalized as Outpatient Status with Observation Services on 3/29/2019 for repeat lumbar laminectomy for cerebral spinal fluid leak. This patient now meets for Inpatient Admission in accordance with CMS regulation Section 43 .3. Specifically, patient's stay is now over Two Midnights and was medically necessary. The patient's stay was medically necessary based on symptoms of headache following urgent surgical procedure requiring strict bedrest with patient to remain flat post procedure. Consistent with CMS guidelines, patient meets for inpatient status. It is our recommendation that this patient's hospitalization status should be upgraded from OBSERVATION to INPATIENT status.  
  
The final decision regarding the patient's hospitalization status depends on the attending physician's judgement. Lanie Graham MD, HEBERT, Physician Advisor [address] [place] [address].

## 2019-04-02 VITALS
RESPIRATION RATE: 18 BRPM | HEIGHT: 63 IN | DIASTOLIC BLOOD PRESSURE: 76 MMHG | TEMPERATURE: 97.8 F | OXYGEN SATURATION: 96 % | SYSTOLIC BLOOD PRESSURE: 120 MMHG | HEART RATE: 71 BPM | BODY MASS INDEX: 27.29 KG/M2 | WEIGHT: 154 LBS

## 2019-04-02 PROCEDURE — 97530 THERAPEUTIC ACTIVITIES: CPT

## 2019-04-02 PROCEDURE — 74011250637 HC RX REV CODE- 250/637: Performed by: NEUROLOGICAL SURGERY

## 2019-04-02 RX ORDER — BUTALBITAL, ACETAMINOPHEN, CAFFEINE AND CODEINE PHOSPHATE 50; 325; 40; 30 MG/1; MG/1; MG/1; MG/1
1 CAPSULE ORAL
Qty: 30 CAP | Refills: 0 | Status: SHIPPED | OUTPATIENT
Start: 2019-04-02 | End: 2019-04-05

## 2019-04-02 RX ADMIN — HYDROCHLOROTHIAZIDE 12.5 MG: 12.5 CAPSULE ORAL at 08:08

## 2019-04-02 RX ADMIN — PANTOPRAZOLE SODIUM 40 MG: 40 TABLET, DELAYED RELEASE ORAL at 06:20

## 2019-04-02 RX ADMIN — BUSPIRONE HYDROCHLORIDE 10 MG: 5 TABLET ORAL at 08:08

## 2019-04-02 RX ADMIN — DULOXETINE 30 MG: 30 CAPSULE, DELAYED RELEASE ORAL at 08:07

## 2019-04-02 RX ADMIN — BUTALBITAL, ACETAMINOPHEN AND CAFFEINE 1 TABLET: 50; 325; 40 TABLET ORAL at 08:07

## 2019-04-02 RX ADMIN — Medication 10 ML: at 06:20

## 2019-04-02 NOTE — PROGRESS NOTES
NS   POD#4 AFEBRILE DOING WELL 
NO  HA 
5/5 POWER   
DRY DRESSINGS 
A/P HOME TODAY Camelia Zamudio MD

## 2019-04-02 NOTE — DISCHARGE INSTRUCTIONS
MAY shower (may shower with dressing on)-->NO tub baths    LEAVE dressing on incision for 3 DAYS-->then may remove   (If dressing starts to fall off may re-secure with tape)    NO lifting anything heavier than 5LBS     NO Bending, Lifting or Twisting    WEAR your brace if prescribed at all times EXCEPT when in bed, just going to the bathroom, or showering    Avoid sitting more than 20 - 30  minutes at a time    NO driving until directed by your doctor    DO NOT take any NSAIDS (either prescribed or over the counter until directed     (Aleve, Ibuprofen, Mobic, etc) as this will interfere with bone healing    CALL Dr. Chanda Ramachandran if:  Fever >100.5  ([phone])               Incision becomes red, swollen or opens up             Incision has yellow, thick drainage or an odor                                   Notify surgeon if have leakage of thin watery drainage from incision                                   Call if headache reoccurs             Pain is not managed with prescribed medications             Excessive nausea and/or vomiting    Avoid having pets sleep in bed with you until incision is completely healed        DISCHARGE SUMMARY from Nurse    PATIENT INSTRUCTIONS:    After general anesthesia or intravenous sedation, for 24 hours or while taking prescription Narcotics:  · Limit your activities  · Do not drive and operate hazardous machinery  · Do not make important personal or business decisions  · Do  not drink alcoholic beverages  · If you have not urinated within 8 hours after discharge, please contact your surgeon on call.     Report the following to your surgeon:  · Excessive pain, swelling, redness or odor of or around the surgical area  · Temperature over 100.5  · Nausea and vomiting lasting longer than 4 hours or if unable to take medications  · Any signs of decreased circulation or nerve impairment to extremity: change in color, persistent  numbness, tingling, coldness or increase pain  · Any questions    What to do at Home:  Recommended activity: see dischareg instructions    If you experience any of the following symptoms see discharge instructions, please follow up with surgeon. *  Please give a list of your current medications to your Primary Care Provider. *  Please update this list whenever your medications are discontinued, doses are      changed, or new medications (including over-the-counter products) are added. *  Please carry medication information at all times in case of emergency situations. These are general instructions for a healthy lifestyle:    No smoking/ No tobacco products/ Avoid exposure to second hand smoke  Surgeon General's Warning:  Quitting smoking now greatly reduces serious risk to your health. Obesity, smoking, and sedentary lifestyle greatly increases your risk for illness    A healthy diet, regular physical exercise & weight monitoring are important for maintaining a healthy lifestyle    You may be retaining fluid if you have a history of heart failure or if you experience any of the following symptoms:  Weight gain of 3 pounds or more overnight or 5 pounds in a week, increased swelling in our hands or feet or shortness of breath while lying flat in bed. Please call your doctor as soon as you notice any of these symptoms; do not wait until your next office visit. Recognize signs and symptoms of STROKE:    F-face looks uneven    A-arms unable to move or move unevenly    S-speech slurred or non-existent    T-time-call 911 as soon as signs and symptoms begin-DO NOT go       Back to bed or wait to see if you get better-TIME IS BRAIN. Warning Signs of HEART ATTACK     Call 911 if you have these symptoms:   Chest discomfort. Most heart attacks involve discomfort in the center of the chest that lasts more than a few minutes, or that goes away and comes back. It can feel like uncomfortable pressure, squeezing, fullness, or pain.    Discomfort in other areas of the upper body. Symptoms can include pain or discomfort in one or both arms, the back, neck, jaw, or stomach.  Shortness of breath with or without chest discomfort.  Other signs may include breaking out in a cold sweat, nausea, or lightheadedness. Don't wait more than five minutes to call 911 - MINUTES MATTER! Fast action can save your life. Calling 911 is almost always the fastest way to get lifesaving treatment. Emergency Medical Services staff can begin treatment when they arrive -- up to an hour sooner than if someone gets to the hospital by car. The discharge information has been reviewed with the patient. The patient verbalized understanding. Discharge medications reviewed with the patient and appropriate educational materials and side effects teaching were provided.   ___________________________________________________________________________________________________________________________________

## 2019-04-02 NOTE — PROGRESS NOTES
Problem: Mobility Impaired (Adult and Pediatric) Goal: *Acute Goals and Plan of Care (Insert Text) Description LTG: 
(1.)Ms. Tanner Chavez will move from supine to sit and sit to supine , scoot up and down and roll side to side with INDEPENDENT within 7 treatment day(s) through log rolling.   
(2.)Ms. Tanner Chavez will transfer from bed to chair and chair to bed with MODIFIED INDEPENDENCE using the least restrictive device within 7 treatment day(s). (3.)Ms. Tanner Chavez will ambulate with MODIFIED INDEPENDENCE for 500+ feet with the least restrictive device within 7 treatment day(s) while maintaining normal vital signs. ______________________________________________________________________________________________ Outcome: Progressing Towards Goal 
  
PHYSICAL THERAPY: Daily Note and AM 4/2/2019 INPATIENT: PT Visit Days : 2 Payor: BLUE CROSS / Plan: [place] BLUE CROSS [facility] / Product Type: PPO /   
  
NAME/AGE/GENDER: William Galvez is a 60 y.o. female PRIMARY DIAGNOSIS: CSF leak [G96.0] CSF leak CSF leak Procedure(s) (LRB): SPINE LUMBAR LAMINECTOMY FOR CSF LEAK (N/A) 4 Days Post-Op ICD-10: Treatment Diagnosis: · Difficulty in walking, Not elsewhere classified (R26.2) Precaution/Allergies: 
Penicillins ASSESSMENT:  
Ms. Tanner Chavez is s/p hospital admission with CSF leak from previous L4-5 laminectomy s/p 2 weeks. Pt now seen post CSF leak laminectomy on 3/29/19, cleared by RN for mobility. Patient was sitting up in straight back chair upon contact and agreeable to PT. Patient transfers to standing with supervision and ambulates 250' with rolling walker, supervision, and no LOB noted. Patient returns to straight back chair where all discharge instructions regarding PT were discussed in detail. Patient verbalizes understanding. Overall good progress towards physical therapy goals. Goals listed above are still appropriate.  Will continue efforts as patient is still below functional baseline. This section established at most recent assessment PROBLEM LIST (Impairments causing functional limitations): 1. Decreased Strength 2. Decreased ADL/Functional Activities 3. Decreased Transfer Abilities 4. Decreased Ambulation Ability/Technique 5. Decreased Balance 6. Decreased Activity Tolerance 7. Decreased [place] with Home Exercise Program 
 INTERVENTIONS PLANNED: (Benefits and precautions of physical therapy have been discussed with the patient.) 1. Balance Exercise 2. Bed Mobility 3. Family Education 4. Gait Training 5. Home Exercise Program (HEP) 6. Therapeutic Activites 7. Therapeutic Exercise/Strengthening 8. Transfer Training TREATMENT PLAN: Frequency/Duration: twice daily for duration of hospital stay Rehabilitation Potential For Stated Goals: Good RECOMMENDED REHABILITATION/EQUIPMENT: (at time of discharge pending progress): Due to the probability of continued deficits (see above) this patient will likely need continued skilled physical therapy after discharge. Equipment:  
? Possibly RW   
    
 
 
 
HISTORY:  
History of Present Injury/Illness (Reason for Referral): A 58-year lady status post right L4-L5 laminectomy and facetectomy two weeks ago, today without any complications. She presented to the office yesterday for suture removal.  She was doing fine, had no signs of leakage or drainage. Her sutures were removed and she went home. This morning she woke up with positional headache and fluid leaking out of her back. She was seen in the office. It was determined that she had a CSF leak and was readmitted for surgical intervention Past Medical History/Comorbidities: Ms. Urbano Alfaro  has a past medical history of Anxiety, Back problem, Depression, Ex-smoker for less than 1 year, Family history of anesthesia complication, GERD (gastroesophageal reflux disease), History of mammogram (2014), History of Papanicolaou smear of cervix (>5 years aog), Hyperlipidemia, Hypertension, Nicotine vapor product user, and Sleep apnea. Ms. Charmayne Najjar  has a past surgical history that includes hx lap cholecystectomy (~1999); hx breast augmentation (2014); hx gastric bypass (2014); hx milka and bso; and hx lumbar laminectomy (03/15/2019). Social History/Living Environment:  
Home Environment: Private residence One/Two Story Residence: One story Living Alone: No 
Support Systems: Friends \ neighbors, Spouse/Significant Other/Partner Patient Expects to be Discharged to[de-identified] Private residence Current DME Used/Available at Home: None Tub or Shower Type: Shower Prior Level of Function/Work/Activity: 
Lives with boyfriend, indep ambulation post surgery, minimal assist ADLs, worked prior to initial surgery. Personal Factors:   
      Sex:  female Age:  60 y.o. Overall Behavior:  agreeable Number of Personal Factors/Comorbidities that affect the Plan of Care: 
HTN, multiple back surgeries 1-2: MODERATE COMPLEXITY EXAMINATION:  
Most Recent Physical Functioning:  
Gross Assessment: 
  
         
  
Posture: 
  
Balance: 
Sitting: Intact Standing: Impaired Standing - Static: Good Standing - Dynamic : Fair Bed Mobility: 
  
Wheelchair Mobility: 
  
Transfers: 
Sit to Stand: Supervision Stand to Sit: Supervision Gait: 
  
Base of Support: Narrowed Speed/Isidra: Slow Step Length: Right shortened;Left shortened Gait Abnormalities: Decreased step clearance Distance (ft): 250 Feet (ft) Assistive Device: Walker, rolling Ambulation - Level of Assistance: Supervision Interventions: Verbal cues; Safety awareness training Body Structures Involved: 1. Muscles Body Functions Affected: 1. Movement Related Activities and Participation Affected: 1. General Tasks and Demands 2. Mobility 3. Self Care Number of elements that affect the Plan of Care: 4+: HIGH COMPLEXITY CLINICAL PRESENTATION:  
 Presentation: Evolving clinical presentation with changing clinical characteristics: MODERATE COMPLEXITY CLINICAL DECISION MAKING:  
[facility] [employer] AM-PAC 6 Clicks Basic Mobility Inpatient Short Form How much difficulty does the patient currently have. .. Unable A Lot A Little None 1. Turning over in bed (including adjusting bedclothes, sheets and blankets)? ? 1   ? 2   ? 3   ? 4  
2. Sitting down on and standing up from a chair with arms ( e.g., wheelchair, bedside commode, etc.)   ? 1   ? 2   ? 3   ? 4  
3. Moving from lying on back to sitting on the side of the bed?   ? 1   ? 2   ? 3   ? 4 How much help from another person does the patient currently need. .. Total A Lot A Little None 4. Moving to and from a bed to a chair (including a wheelchair)? ? 1   ? 2   ? 3   ? 4  
5. Need to walk in hospital room? ? 1   ? 2   ? 3   ? 4  
6. Climbing 3-5 steps with a railing? ? 1   ? 2   ? 3   ? 4  
© 2007, Trustees of [facility] [employer], under license to [employer]. All rights reserved Score:  Initial: 19 Most Recent: X (Date: -- ) Interpretation of Tool:  Represents activities that are increasingly more difficult (i.e. Bed mobility, Transfers, Gait). Medical Necessity:    
· Patient is expected to demonstrate progress in strength, range of motion, balance and coordination ·  to decrease assistance required with overall functional mobility, transfers, ambulation · . · Patient demonstrates good ·  rehab potential due to higher previous functional level. Reason for Services/Other Comments: 
· Patient continues to require present interventions due to patient's inability to perform transfers, ambulation safely and effectively at prior level of function of independence. · . Use of outcome tool(s) and clinical judgement create a POC that gives a: Clear prediction of patient's progress: LOW COMPLEXITY  
  
 
 
 
TREATMENT:  
 (In addition to Assessment/Re-Assessment sessions the following treatments were rendered) Pre-treatment Symptoms/Complaints:  None Pain: Initial:  
Pain Intensity 1: 0  Post Session:  0/10 post mobility Therapeutic Activity: (    15 Minutes): Therapeutic activities including transfer training, ambulation on level ground, static/dynamic standing balance training, posture training, instruction in sequencing with rolling walker, review of spinal precautions, and patient education  to improve mobility, strength, balance and coordination. Required minimal Verbal cues; Safety awareness training to promote static and dynamic balance in standing and promote coordination of bilateral, lower extremity(s). Braces/Orthotics/Lines/Etc:  
· O2 Device: Room air Treatment/Session Assessment:   
· Response to Treatment:  tolerated well · Interdisciplinary Collaboration:  
o Physical Therapy Assistant 
o Registered Nurse · After treatment position/precautions:  
o Up in chair 
o Bed/Chair-wheels locked 
o Call light within reach 
o RN notified · Compliance with Program/Exercises: Will assess as treatment progresses · Recommendations/Intent for next treatment session: \"Next visit will focus on advancements to more challenging activities\". Total Treatment Duration: PT Patient Time In/Time Out Time In: 8304 Time Out: 9974 Judith Anand PTA

## 2019-04-02 NOTE — DISCHARGE SUMMARY
[facility]  DISCHARGE SUMMARY    Name:  Sarkis Sterling  MR#:  [id number]  :  1960  ACCOUNT #:  [de-identified]  ADMIT DATE:  2019  DISCHARGE DATE:  2019    DISCHARGE DIAGNOSES:  1. Postoperative cerebrospinal fluid leak. 2.  Lumbar stenosis. 3.  Anxiety. 4.  Hypertension. 5.  Gastroesophageal reflux disease. 6.  History of gastric bypass. 7.  Hyperlipidemia. 8.  Depression. 9.  Vitamin D deficiency. OPERATIONS AND PROCEDURES:  Lumbar laminectomy for repair of CSF leak on . COMPLICATIONS:  None. DISCHARGE CONDITION:  Satisfactory. HISTORY OF PRESENT ILLNESS:  A 63-year lady status post back surgery for lumbar stenosis who was doing well. She was seen in the office with no complaints; however, the following day, she developed headache, positional in nature and some clear fluid drainage from her incision. She was admitted for reexploration and repair of her CSF leak. ALLERGIES:  PENICILLIN. PAST MEDICAL HISTORY:  As listed above. On exam, she did have an incision which was opposed without signs of infection, swelling or bruising; however, clear fluid was evident. Positional headache was present. HOSPITAL COURSE:  She was taken to the operating room on  and underwent successful lumbar laminectomy for repair of CSF leak. The leak appeared to be ventral and was not visible. It was not sutured, but it was patched with DuraGen and DuraSeal.  She was kept flat for 3 days. She was seen by Physical Therapy yesterday. She had a mild headache which was easily treated with Fioricet. She did well with therapy, was able to eat and ambulate without any difficulties. On the day of discharge, she was ambulatory, afebrile, self-sufficient, and was able to discharged home. DISCHARGE MEDICATIONS:  Include admission medicines plus Fioricet q.6 hours p.r.n. CONDITION ON DISCHARGE:  Improved. DISCHARGE INSTRUCTIONS:  Diet regular. Activities as tolerated. Showers are permissible, but no bath. She will contact the physician if she develops any recurrent headache or leakage. Return to the office in 1 week for wound check.       MD GRACIE Valdez/[email or web address]  D:  04/02/2019 7:51  T:  04/02/2019 7:53  JOB #:  [id number]

## 2019-04-05 ENCOUNTER — HOSPITAL ENCOUNTER (EMERGENCY)
Age: 59
Discharge: HOME | End: 2019-04-06
Attending: EMERGENCY MEDICINE
Payer: COMMERCIAL

## 2019-04-05 VITALS
HEIGHT: 63 IN | BODY MASS INDEX: 27.29 KG/M2 | DIASTOLIC BLOOD PRESSURE: 90 MMHG | SYSTOLIC BLOOD PRESSURE: 144 MMHG | WEIGHT: 154 LBS | HEART RATE: 81 BPM | TEMPERATURE: 98.3 F | RESPIRATION RATE: 18 BRPM | OXYGEN SATURATION: 95 %

## 2019-04-05 DIAGNOSIS — G97.82 POSTOPERATIVE SURGICAL COMPLICATION INVOLVING NERVOUS SYSTEM ASSOCIATED WITH NERVOUS SYSTEM PROCEDURE, UNSPECIFIED COMPLICATION: Primary | ICD-10-CM

## 2019-04-05 PROCEDURE — 99283 EMERGENCY DEPT VISIT LOW MDM: CPT | Performed by: EMERGENCY MEDICINE

## 2019-04-06 NOTE — ED PROVIDER NOTES
Patient is a 53-year-old female who had back surgery for lumbar stenosis a few weeks ago by Dr. Chanda Ramachandran of neurosurgery. She then developed a dural leak and had to have repeat surgery one week ago. She stayed in the hospital lying flat for 3 days. The headaches have resolved and she is doing much better today her friend noted some swelling at the site of the surgical incision on her lower back. They deny any fever. They deny any bleeding or drainage from the wound. The history is provided by the patient and a friend. Post-Op Problem This is a new problem. The current episode started 12 to 24 hours ago. The problem has not changed since onset. Pertinent negatives include no chest pain, no abdominal pain, no headaches and no shortness of breath. Nothing aggravates the symptoms. Nothing relieves the symptoms. She has tried nothing for the symptoms. Past Medical History:  
Diagnosis Date  Anxiety  Back problem  Depression  Ex-smoker for less than 1 year Quit 1/2018  1 ppd x 30 yrs  Family history of anesthesia complication Patient's father developed an arrhythmia in PACU s/p hip arthroplasty  GERD (gastroesophageal reflux disease)   
 well controlled with meds  History of mammogram 2014  History of Papanicolaou smear of cervix >5 years aog  Hyperlipidemia  Hypertension   
 hctz  Nicotine vapor product user   
 0.9%  Sleep apnea   
 no CPAP Past Surgical History:  
Procedure Laterality Date  HX BREAST AUGMENTATION  2014  HX GASTRIC BYPASS  2014 Dr. Frankey Coddington  HX LAP CHOLECYSTECTOMY  ~1999  HX LUMBAR LAMINECTOMY  03/15/2019  
 right L4-5 Lami  
 HX CHARO AND BSO Family History:  
Problem Relation Age of Onset  Breast Cancer Mother  Hypertension Mother  Stroke Father  Hypertension Father  Stroke Maternal Grandmother  Stroke Maternal Grandfather  Stroke Paternal Grandmother  Stroke Paternal Grandfather Social History Socioeconomic History  Marital status:  Spouse name: Not on file  Number of children: 0  
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  
  Worry: Not on file Inability: Not on file  Transportation needs:  
  Medical: Not on file Non-medical: Not on file Tobacco Use  Smoking status: Former Smoker Packs/day: 1.00 Years: 30.00 Pack years: 30.00 Types: Cigarettes Last attempt to quit: 2018 Years since quittin.2  Smokeless tobacco: Never Used Substance and Sexual Activity  Alcohol use: Yes Alcohol/week: 0.6 oz Types: 1 Glasses of wine per week  Drug use: No  
 Sexual activity: Not on file Lifestyle  Physical activity:  
  Days per week: Not on file Minutes per session: Not on file  Stress: Not on file Relationships  Social connections:  
  Talks on phone: Not on file Gets together: Not on file Attends Sikh service: Not on file Active member of club or organization: Not on file Attends meetings of clubs or organizations: Not on file Relationship status: Not on file  Intimate partner violence:  
  Fear of current or ex partner: Not on file Emotionally abused: Not on file Physically abused: Not on file Forced sexual activity: Not on file Other Topics Concern  Not on file Social History Narrative  Not on file ALLERGIES: Penicillins Review of Systems Constitutional: Negative for chills, fatigue and fever. HENT: Negative for congestion, rhinorrhea and sore throat. Eyes: Negative for pain, discharge and visual disturbance. Respiratory: Negative for cough and shortness of breath. Cardiovascular: Negative for chest pain and palpitations. Gastrointestinal: Negative for abdominal pain, diarrhea and nausea. Endocrine: Negative for polydipsia and polyuria. Genitourinary: Negative for dysuria, frequency and urgency. Musculoskeletal: Negative for back pain and neck pain. Skin: Negative for rash. Neurological: Negative for seizures, syncope, weakness and headaches. Hematological: Negative. Vitals:  
 04/05/19 2153 BP: 144/90 Pulse: 81 Resp: 18 Temp: 98.3 °F (36.8 °C) SpO2: 95% Weight: 69.9 kg (154 lb) Height: 5' 3\" (1.6 m) Physical Exam  
Constitutional: She is oriented to person, place, and time. She appears well-developed and well-nourished. HENT:  
Head: Normocephalic and atraumatic. Eyes: Pupils are equal, round, and reactive to light. Conjunctivae and EOM are normal.  
Neck: Normal range of motion. Neck supple. Cardiovascular: Normal heart sounds. Pulmonary/Chest: Effort normal and breath sounds normal.  
Neurological: She is alert and oriented to person, place, and time. No cranial nerve deficit or sensory deficit. Skin: Skin is warm and dry. Capillary refill takes less than 2 seconds. No erythema. Surgical incision site over the lumbar spine is clean and dry there is no bleeding or fluid leakage. Sutures are intact. Nursing note and vitals reviewed. MDM Number of Diagnoses or Management Options Diagnosis management comments: Vitals are unremarkable and she is well-appearing I see no obvious bleeding or fluid leakage on examination. She is no longer having any headaches or concern for further dural leak by symptom. I called and discussed the case with Dr. Nydia Jean-Baptiste who is on-call tonight for neurosurgery. He advised that as long as the wound is not leaking the patient can follow-up on Tuesday with her surgeon as scheduled. Amount and/or Complexity of Data Reviewed Review and summarize past medical records: yes Discuss the patient with other providers: yes Risk of Complications, Morbidity, and/or Mortality Presenting problems: low Diagnostic procedures: minimal 
Management options: minimal 
 
Patient Progress Patient progress: stable Procedures

## 2019-04-06 NOTE — ED TRIAGE NOTES
S/p L4-5 laminectomy and facetectomy with dr Shelia Loya 3/15. Now with c/o swelling to site, visualized in triage. States admitted again 3/29 for csf leak and exploration noted. Reports intermittent head pain since however denies worsening of head pain. Denies drainage from site. Denies n/v. Denies fever or chills.

## 2019-04-06 NOTE — ROUTINE PROCESS
I have reviewed discharge instructions with the patient. The patient verbalized understanding. Patient left ED via Discharge Method: ambulatory to Home with friend. Opportunity for questions and clarification provided. Patient given 0 scripts. To continue your aftercare when you leave the hospital, you may receive an automated call from our care team to check in on how you are doing. This is a free service and part of our promise to provide the best care and service to meet your aftercare needs.  If you have questions, or wish to unsubscribe from this service please call [phone]. Thank you for Choosing our [facility].

## 2019-04-06 NOTE — DISCHARGE INSTRUCTIONS
Follow-up with your surgeon as scheduled on Tuesday. Return for any fever, bleeding, or fluid leakage from the wound.

## 2019-04-10 ENCOUNTER — APPOINTMENT (OUTPATIENT)
Dept: GENERAL RADIOLOGY | Age: 59
End: 2019-04-10
Attending: EMERGENCY MEDICINE
Payer: COMMERCIAL

## 2019-04-10 ENCOUNTER — HOSPITAL ENCOUNTER (EMERGENCY)
Age: 59
Discharge: HOME | End: 2019-04-10
Attending: EMERGENCY MEDICINE | Admitting: EMERGENCY MEDICINE
Payer: COMMERCIAL

## 2019-04-10 VITALS
TEMPERATURE: 98.1 F | SYSTOLIC BLOOD PRESSURE: 125 MMHG | HEIGHT: 62 IN | HEART RATE: 80 BPM | BODY MASS INDEX: 28.52 KG/M2 | WEIGHT: 155 LBS | DIASTOLIC BLOOD PRESSURE: 80 MMHG | OXYGEN SATURATION: 99 % | RESPIRATION RATE: 16 BRPM

## 2019-04-10 DIAGNOSIS — S52.602A CLOSED FRACTURE OF DISTAL ENDS OF LEFT RADIUS AND ULNA, INITIAL ENCOUNTER: Primary | ICD-10-CM

## 2019-04-10 DIAGNOSIS — S52.502A CLOSED FRACTURE OF DISTAL ENDS OF LEFT RADIUS AND ULNA, INITIAL ENCOUNTER: Primary | ICD-10-CM

## 2019-04-10 PROCEDURE — A4565 SLINGS: HCPCS

## 2019-04-10 PROCEDURE — 75810000053 HC SPLINT APPLICATION: Performed by: EMERGENCY MEDICINE

## 2019-04-10 PROCEDURE — 99284 EMERGENCY DEPT VISIT MOD MDM: CPT | Performed by: EMERGENCY MEDICINE

## 2019-04-10 PROCEDURE — 77030008298 HC SPLNT FBRGLS SCTCH 3M -A

## 2019-04-10 PROCEDURE — 73110 X-RAY EXAM OF WRIST: CPT

## 2019-04-10 RX ORDER — HYDROCODONE BITARTRATE AND ACETAMINOPHEN 10; 325 MG/1; MG/1
1 TABLET ORAL
Qty: 15 TAB | Refills: 0 | Status: SHIPPED | OUTPATIENT
Start: 2019-04-10 | End: 2019-04-13

## 2019-04-11 NOTE — ED PROVIDER NOTES
Patient with left wrist pain and swelling after fall yesterday. Worse today so came in. No other injury. Did not hit her head or lose consciousness. The history is provided by the patient. No  was used. Fall The accident occurred yesterday. The fall occurred while walking. She fell from a height of ground level. She landed on grass. There was no blood loss. The point of impact was the left wrist. The pain is present in the left wrist. The pain is moderate. She was ambulatory at the scene. Pertinent negatives include no visual change, no fever, no numbness, no abdominal pain, no nausea, no vomiting, no headaches, no loss of consciousness, no tingling and no laceration. The symptoms are aggravated by pressure on injury. She has tried nothing for the symptoms. Past Medical History:  
Diagnosis Date  Anxiety  Back problem  Depression  Ex-smoker for less than 1 year Quit 1/2018  1 ppd x 30 yrs  Family history of anesthesia complication Patient's father developed an arrhythmia in PACU s/p hip arthroplasty  GERD (gastroesophageal reflux disease)   
 well controlled with meds  History of mammogram 2014  History of Papanicolaou smear of cervix >5 years aog  Hyperlipidemia  Hypertension   
 hctz  Nicotine vapor product user   
 0.9%  Sleep apnea   
 no CPAP Past Surgical History:  
Procedure Laterality Date  HX BREAST AUGMENTATION  2014  HX GASTRIC BYPASS  2014 Dr. Keo Mondragon  HX LAP CHOLECYSTECTOMY  ~1999  HX LUMBAR LAMINECTOMY  03/15/2019  
 right L4-5 Lami  
 HX CHARO AND BSO  NEUROLOGICAL PROCEDURE UNLISTED  03/29/2019  
 repair of CSF leak right L4-5 Family History:  
Problem Relation Age of Onset  Breast Cancer Mother  Hypertension Mother  Stroke Father  Hypertension Father  Stroke Maternal Grandmother  Stroke Maternal Grandfather  Stroke Paternal Grandmother  Stroke Paternal Grandfather Social History Socioeconomic History  Marital status:  Spouse name: Not on file  Number of children: 0  
 Years of education: Not on file  Highest education level: Not on file Occupational History  Occupation: [employer], collections Social Needs  Financial resource strain: Not on file  Food insecurity:  
  Worry: Not on file Inability: Not on file  Transportation needs:  
  Medical: Not on file Non-medical: Not on file Tobacco Use  Smoking status: Former Smoker Packs/day: 1.00 Years: 30.00 Pack years: 30.00 Types: Cigarettes Last attempt to quit: 2018 Years since quittin.2  Smokeless tobacco: Never Used Substance and Sexual Activity  Alcohol use: Yes Alcohol/week: 0.6 oz Types: 1 Glasses of wine per week  Drug use: No  
 Sexual activity: Not on file Lifestyle  Physical activity:  
  Days per week: Not on file Minutes per session: Not on file  Stress: Not on file Relationships  Social connections:  
  Talks on phone: Not on file Gets together: Not on file Attends Spiritism service: Not on file Active member of club or organization: Not on file Attends meetings of clubs or organizations: Not on file Relationship status: Not on file  Intimate partner violence:  
  Fear of current or ex partner: Not on file Emotionally abused: Not on file Physically abused: Not on file Forced sexual activity: Not on file Other Topics Concern  Not on file Social History Narrative  Not on file ALLERGIES: Penicillins Review of Systems Constitutional: Negative for chills and fever. Eyes: Negative for pain and redness. Respiratory: Negative for chest tightness, shortness of breath and wheezing. Cardiovascular: Negative for chest pain and leg swelling. Gastrointestinal: Negative for abdominal pain, diarrhea, nausea and vomiting. Musculoskeletal: Positive for arthralgias and joint swelling. Negative for back pain, gait problem, neck pain and neck stiffness. Skin: Negative for color change and rash. Neurological: Negative for tingling, loss of consciousness, weakness, numbness and headaches. Vitals:  
 04/10/19 2046 BP: 127/83 Pulse: 82 Resp: 16 Temp: 98.7 °F (37.1 °C) SpO2: 97% Weight: 70.3 kg (155 lb) Height: 5' 2\" (1.575 m) Physical Exam  
Constitutional: She is oriented to person, place, and time. She appears well-developed and well-nourished. HENT:  
Head: Normocephalic and atraumatic. Neck: Neck supple. Cardiovascular: Normal rate and regular rhythm. Pulmonary/Chest: Effort normal and breath sounds normal.  
Abdominal: Soft. Bowel sounds are normal. There is no tenderness. Musculoskeletal: Normal range of motion. She exhibits edema, tenderness (left wrist. LROM. radial pulse intact. distal sensation. intact. ) and deformity. Neurological: She is alert and oriented to person, place, and time. Skin: Skin is warm and dry. No laceration noted. MDM Number of Diagnoses or Management Options Diagnosis management comments: Left distal radius and ulnar fracture. We'll splint and refer to orthopedics. Amount and/or Complexity of Data Reviewed Tests in the radiology section of CPT®: ordered and reviewed Tests in the medicine section of CPT®: ordered and reviewed Patient Progress Patient progress: stable Procedures XR WRIST LT AP/LAT/OBL MIN 3V (Final result) Result time 04/10/19 21:02:31 Final result by Cira Odom MD (04/10/19 21:02:31) Impression:  
 IMPRESSION: Fractures in the distal radius and ulna. Narrative: EXAM: Left wrist x-rays. INDICATION: Pain after falling. COMPARISON: None. TECHNIQUE: 3 views. FINDINGS: There is an impacted fracture in the distal metaphysis of the radius, 
with intra-articular extension. There is also a minimally displaced ulnar 
styloid process fracture.  Carpal alignment is anatomic, with no evidence of a 
carpal bone fracture.

## 2019-04-11 NOTE — DISCHARGE INSTRUCTIONS
Patient Education        Broken Wrist: Care Instructions  Your Care Instructions    Your wrist can break, or fracture, during sports, a fall, or other accidents. The break may happen when your wrist is hit or is used to protect you in a fall. Fractures can range from a small, hairline crack, to a bone or bones broken into two or more pieces. Your treatment depends on how bad the break is. Your doctor may have put your wrist in a cast or splint. This will help keep your wrist stable until your follow-up appointment. It may take weeks or months for your wrist to heal. You can help it heal with care at home. You heal best when you take good care of yourself. Eat a variety of healthy foods, and don't smoke. Follow-up care is a key part of your treatment and safety. Be sure to make and go to all appointments, and call your doctor if you are having problems. It's also a good idea to know your test results and keep a list of the medicines you take. How can you care for yourself at home? · Put ice or a cold pack on your wrist for 10 to 20 minutes at a time. Try to do this every 1 to 2 hours for the next 3 days (when you are awake). Put a thin cloth between the ice and your cast or splint. Keep your cast or splint dry. · Follow the splint or cast care instructions your doctor gives you. If you have a splint, do not take it off unless your doctor tells you to. Be careful not to put the splint on too tight. · Be safe with medicines. Take pain medicines exactly as directed. ? If the doctor gave you a prescription medicine for pain, take it as prescribed. ? If you are not taking a prescription pain medicine, ask your doctor if you can take an over-the-counter medicine. · Prop up your wrist on pillows when you sit or lie down in the first few days after the injury. Keep your wrist higher than the level of your heart. This will help reduce swelling.   · Move your fingers often to reduce swelling and stiffness, but do not use that hand to grab or carry anything. · Follow instructions for exercises to keep your arm strong. When should you call for help? Call your doctor now or seek immediate medical care if:    · You have new or worse pain.     · Your hand or fingers are cool or pale or change color.     · Your cast or splint feels too tight.     · You have tingling, weakness, or numbness in your hand or fingers.    Watch closely for changes in your health, and be sure to contact your doctor if:    · You do not get better as expected.     · You have problems with your cast or splint. Where can you learn more? Go to http://fidel-dae.info/. Enter [id number] in the search box to learn more about \"Broken Wrist: Care Instructions. \"  Current as of: September 20, 2018  Content Version: 11.9  © 5573-3994 [employer]. Care instructions adapted under license by [employer] (which disclaims liability or warranty for this information). If you have questions about a medical condition or this instruction, always ask your healthcare professional. [address] any warranty or liability for your use of this information.

## 2019-04-11 NOTE — ED NOTES
I have reviewed discharge instructions with the patient. The patient verbalized understanding. Patient left ED via Discharge Method: ambulatory to Home with self. Opportunity for questions and clarification provided. Patient given 1 scripts. To continue your aftercare when you leave the hospital, you may receive an automated call from our care team to check in on how you are doing. This is a free service and part of our promise to provide the best care and service to meet your aftercare needs.  If you have questions, or wish to unsubscribe from this service please call [phone]. Thank you for Choosing our [facility] Emergency Department.

## 2019-06-04 PROBLEM — G96.00 CSF LEAK: Status: RESOLVED | Noted: 2019-03-29 | Resolved: 2019-06-04

## 2019-07-09 PROBLEM — G44.89 OTHER HEADACHE SYNDROME: Status: ACTIVE | Noted: 2019-07-09

## 2019-07-11 ENCOUNTER — HOSPITAL ENCOUNTER (OUTPATIENT)
Dept: MRI IMAGING | Age: 59
Discharge: HOME | End: 2019-07-11
Attending: NEUROLOGICAL SURGERY
Payer: SUBSIDIZED

## 2019-07-11 ENCOUNTER — HOSPITAL ENCOUNTER (OUTPATIENT)
Dept: SURGERY | Age: 59
Discharge: HOME | End: 2019-07-11
Payer: SUBSIDIZED

## 2019-07-11 VITALS
OXYGEN SATURATION: 97 % | TEMPERATURE: 98.3 F | DIASTOLIC BLOOD PRESSURE: 76 MMHG | WEIGHT: 155 LBS | RESPIRATION RATE: 16 BRPM | HEIGHT: 63 IN | HEART RATE: 77 BPM | BODY MASS INDEX: 27.46 KG/M2 | SYSTOLIC BLOOD PRESSURE: 116 MMHG

## 2019-07-11 DIAGNOSIS — G96.00 CSF LEAK: ICD-10-CM

## 2019-07-11 DIAGNOSIS — G44.89 OTHER HEADACHE SYNDROME: ICD-10-CM

## 2019-07-11 LAB
BACTERIA SPEC CULT: NORMAL
POTASSIUM SERPL-SCNC: 3.2 MMOL/L (ref 3.5–5.1)
SERVICE CMNT-IMP: NORMAL

## 2019-07-11 PROCEDURE — A9575 INJ GADOTERATE MEGLUMI 0.1ML: HCPCS

## 2019-07-11 PROCEDURE — 74011250636 HC RX REV CODE- 250/636

## 2019-07-11 PROCEDURE — 72158 MRI LUMBAR SPINE W/O & W/DYE: CPT

## 2019-07-11 PROCEDURE — 87641 MR-STAPH DNA AMP PROBE: CPT

## 2019-07-11 PROCEDURE — 84132 ASSAY OF SERUM POTASSIUM: CPT

## 2019-07-11 RX ORDER — GADOTERATE MEGLUMINE 376.9 MG/ML
14 INJECTION INTRAVENOUS
Status: ACTIVE | OUTPATIENT
Start: 2019-07-11 | End: 2019-07-11

## 2019-07-11 RX ORDER — SODIUM CHLORIDE 0.9 % (FLUSH) 0.9 %
10 SYRINGE (ML) INJECTION
Status: ACTIVE | OUTPATIENT
Start: 2019-07-11 | End: 2019-07-11

## 2019-07-11 NOTE — PERIOP NOTES
Patient verified name, , and surgery as listed in [facility]. Patient provided medical/health information and PTA medications to the best of their ability. TYPE  CASE: 1b  Orders per surgeon: yes received and dated yes  Labs per surgeon: mrsa swab. Results: -  Labs per anesthesia protocol: potassium. Results -  EKG:  na     Nasal Swab collected per MD order and instructions for Mupirocin nasal ointment if required. Patient provided with and instructed on education handouts including Guide to Surgery, blood transfusions, pain management, and hand hygiene for the family and community, and [facility] brochure. Road to Recovery Spine surgery patient guide given. Instructed on incentive spirometry with return demonstration. Long handled prehab sponge given with instructions for use. Patient viewed spine prehab video. Hibiclens and instructions given per hospital policy. Instructed patient to continue previous medications as prescribed prior to surgery unless otherwise directed and to take the following medications the day of surgery according to anesthesia guidelines : buspar,cymbalta,fioricet as needed,omeprazole,simvastatin . Instructed patient to hold  the following medications on the day of surgery: multivitamin. Original medication prescription bottles none visualized during patient appointment. Patient teach back successful and patient demonstrates knowledge of instruction.

## 2019-07-14 ENCOUNTER — ANESTHESIA EVENT (OUTPATIENT)
Dept: SURGERY | Age: 59
End: 2019-07-14
Payer: SUBSIDIZED

## 2019-07-15 ENCOUNTER — ANESTHESIA (OUTPATIENT)
Dept: SURGERY | Age: 59
End: 2019-07-15
Payer: SUBSIDIZED

## 2019-07-15 ENCOUNTER — HOSPITAL ENCOUNTER (OUTPATIENT)
Age: 59
Discharge: HOME | End: 2019-07-16
Attending: NEUROLOGICAL SURGERY | Admitting: NEUROLOGICAL SURGERY
Payer: SUBSIDIZED

## 2019-07-15 DIAGNOSIS — G97.82 POSTOPERATIVE CSF LEAK: Primary | ICD-10-CM

## 2019-07-15 DIAGNOSIS — G96.00 CSF LEAK: ICD-10-CM

## 2019-07-15 DIAGNOSIS — G96.00 POSTOPERATIVE CSF LEAK: Primary | ICD-10-CM

## 2019-07-15 LAB
GLUCOSE BLD STRIP.AUTO-MCNC: 103 MG/DL (ref 65–100)
POTASSIUM BLD-SCNC: 2.7 MMOL/L (ref 3.5–5.1)
POTASSIUM SERPL-SCNC: 2.8 MMOL/L (ref 3.5–5.1)

## 2019-07-15 PROCEDURE — 74011000250 HC RX REV CODE- 250

## 2019-07-15 PROCEDURE — 77030018836 HC SOL IRR NACL ICUM -A: Performed by: NEUROLOGICAL SURGERY

## 2019-07-15 PROCEDURE — 77030028270 HC SRGFL HEMSTAT MTRX J&J -C: Performed by: NEUROLOGICAL SURGERY

## 2019-07-15 PROCEDURE — 74011000250 HC RX REV CODE- 250: Performed by: NEUROLOGICAL SURGERY

## 2019-07-15 PROCEDURE — 84132 ASSAY OF SERUM POTASSIUM: CPT

## 2019-07-15 PROCEDURE — 77030013951 HC SEAL TISS ADH DURASL KT INLC -G1: Performed by: NEUROLOGICAL SURGERY

## 2019-07-15 PROCEDURE — 77030014007 HC SPNG HEMSTAT J&J -B: Performed by: NEUROLOGICAL SURGERY

## 2019-07-15 PROCEDURE — 77030032490 HC SLV COMPR SCD KNE COVD -B: Performed by: NEUROLOGICAL SURGERY

## 2019-07-15 PROCEDURE — 74011250636 HC RX REV CODE- 250/636

## 2019-07-15 PROCEDURE — 77030018390 HC SPNG HEMSTAT2 J&J -B: Performed by: NEUROLOGICAL SURGERY

## 2019-07-15 PROCEDURE — 74011250636 HC RX REV CODE- 250/636: Performed by: ANESTHESIOLOGY

## 2019-07-15 PROCEDURE — 74011250636 HC RX REV CODE- 250/636: Performed by: NEUROLOGICAL SURGERY

## 2019-07-15 PROCEDURE — 76010000161 HC OR TIME 1 TO 1.5 HR INTENSV-TIER 1: Performed by: NEUROLOGICAL SURGERY

## 2019-07-15 PROCEDURE — 74011000250 HC RX REV CODE- 250: Performed by: ANESTHESIOLOGY

## 2019-07-15 PROCEDURE — 82962 GLUCOSE BLOOD TEST: CPT

## 2019-07-15 PROCEDURE — 77030012935 HC DRSG AQUACEL BMS -B: Performed by: NEUROLOGICAL SURGERY

## 2019-07-15 PROCEDURE — 76210000006 HC OR PH I REC 0.5 TO 1 HR: Performed by: NEUROLOGICAL SURGERY

## 2019-07-15 PROCEDURE — 77030033269 HC SLV COMPR SCD KNE2 CARD -B: Performed by: NEUROLOGICAL SURGERY

## 2019-07-15 PROCEDURE — 77030002946 HC SUT NRLN J&J -B: Performed by: NEUROLOGICAL SURGERY

## 2019-07-15 PROCEDURE — 76060000034 HC ANESTHESIA 1.5 TO 2 HR: Performed by: NEUROLOGICAL SURGERY

## 2019-07-15 PROCEDURE — 77030003029 HC SUT VCRL J&J -B: Performed by: NEUROLOGICAL SURGERY

## 2019-07-15 PROCEDURE — 77030032490 HC SLV COMPR SCD KNE COVD -B

## 2019-07-15 PROCEDURE — 77030019940 HC BLNKT HYPOTHRM STRY -B: Performed by: ANESTHESIOLOGY

## 2019-07-15 PROCEDURE — 74011250637 HC RX REV CODE- 250/637: Performed by: NEUROLOGICAL SURGERY

## 2019-07-15 PROCEDURE — 77030030163 HC BN WAX J&J -A: Performed by: NEUROLOGICAL SURGERY

## 2019-07-15 PROCEDURE — 77030002700 HC GRFT DURA SUTRBL INLC -D: Performed by: NEUROLOGICAL SURGERY

## 2019-07-15 PROCEDURE — 77030039267 HC ADH SKN EXOFIN S2SG -B: Performed by: NEUROLOGICAL SURGERY

## 2019-07-15 PROCEDURE — 77030037088 HC TUBE ENDOTRACH ORAL NSL COVD-A: Performed by: ANESTHESIOLOGY

## 2019-07-15 PROCEDURE — 77030019908 HC STETH ESOPH SIMS -A: Performed by: ANESTHESIOLOGY

## 2019-07-15 PROCEDURE — 77030002916 HC SUT ETHLN J&J -A: Performed by: NEUROLOGICAL SURGERY

## 2019-07-15 PROCEDURE — 77030039425 HC BLD LARYNG TRULITE DISP TELE -A: Performed by: ANESTHESIOLOGY

## 2019-07-15 DEVICE — DURAGEN® DURAL GRAFT MATRIX 1PK, 2X2 DOM
Type: IMPLANTABLE DEVICE | Site: SPINE LUMBAR | Status: FUNCTIONAL
Brand: DURAGEN®

## 2019-07-15 RX ORDER — OXYCODONE HYDROCHLORIDE 5 MG/1
5 TABLET ORAL
Status: DISCONTINUED | OUTPATIENT
Start: 2019-07-15 | End: 2019-07-15 | Stop reason: HOSPADM

## 2019-07-15 RX ORDER — SODIUM CHLORIDE 0.9 % (FLUSH) 0.9 %
5-40 SYRINGE (ML) INJECTION EVERY 8 HOURS
Status: DISCONTINUED | OUTPATIENT
Start: 2019-07-15 | End: 2019-07-16 | Stop reason: HOSPADM

## 2019-07-15 RX ORDER — NEOSTIGMINE METHYLSULFATE 1 MG/ML
INJECTION INTRAVENOUS AS NEEDED
Status: DISCONTINUED | OUTPATIENT
Start: 2019-07-15 | End: 2019-07-15 | Stop reason: HOSPADM

## 2019-07-15 RX ORDER — TIZANIDINE 2 MG/1
4 TABLET ORAL
Status: DISCONTINUED | OUTPATIENT
Start: 2019-07-15 | End: 2019-07-15

## 2019-07-15 RX ORDER — DIPHENHYDRAMINE HYDROCHLORIDE 50 MG/ML
12.5 INJECTION, SOLUTION INTRAMUSCULAR; INTRAVENOUS ONCE
Status: DISCONTINUED | OUTPATIENT
Start: 2019-07-15 | End: 2019-07-15 | Stop reason: HOSPADM

## 2019-07-15 RX ORDER — BACITRACIN 50000 [IU]/1
INJECTION, POWDER, FOR SOLUTION INTRAMUSCULAR AS NEEDED
Status: DISCONTINUED | OUTPATIENT
Start: 2019-07-15 | End: 2019-07-15 | Stop reason: HOSPADM

## 2019-07-15 RX ORDER — NALOXONE HYDROCHLORIDE 0.4 MG/ML
0.1 INJECTION, SOLUTION INTRAMUSCULAR; INTRAVENOUS; SUBCUTANEOUS AS NEEDED
Status: DISCONTINUED | OUTPATIENT
Start: 2019-07-15 | End: 2019-07-15 | Stop reason: HOSPADM

## 2019-07-15 RX ORDER — FENTANYL CITRATE 50 UG/ML
INJECTION, SOLUTION INTRAMUSCULAR; INTRAVENOUS AS NEEDED
Status: DISCONTINUED | OUTPATIENT
Start: 2019-07-15 | End: 2019-07-15 | Stop reason: HOSPADM

## 2019-07-15 RX ORDER — ACETAMINOPHEN 500 MG
500 TABLET ORAL ONCE
Status: DISCONTINUED | OUTPATIENT
Start: 2019-07-15 | End: 2019-07-15 | Stop reason: HOSPADM

## 2019-07-15 RX ORDER — HYDROCHLOROTHIAZIDE 12.5 MG/1
12.5 CAPSULE ORAL DAILY
Status: DISCONTINUED | OUTPATIENT
Start: 2019-07-16 | End: 2019-07-16 | Stop reason: HOSPADM

## 2019-07-15 RX ORDER — BUTALBITAL, ACETAMINOPHEN AND CAFFEINE 50; 325; 40 MG/1; MG/1; MG/1
1 TABLET ORAL
Status: DISCONTINUED | OUTPATIENT
Start: 2019-07-15 | End: 2019-07-16 | Stop reason: HOSPADM

## 2019-07-15 RX ORDER — DULOXETIN HYDROCHLORIDE 30 MG/1
30 CAPSULE, DELAYED RELEASE ORAL DAILY
Status: DISCONTINUED | OUTPATIENT
Start: 2019-07-16 | End: 2019-07-16 | Stop reason: HOSPADM

## 2019-07-15 RX ORDER — POTASSIUM CHLORIDE 14.9 MG/ML
20 INJECTION INTRAVENOUS
Status: COMPLETED | OUTPATIENT
Start: 2019-07-15 | End: 2019-07-15

## 2019-07-15 RX ORDER — PROPOFOL 10 MG/ML
INJECTION, EMULSION INTRAVENOUS AS NEEDED
Status: DISCONTINUED | OUTPATIENT
Start: 2019-07-15 | End: 2019-07-15 | Stop reason: HOSPADM

## 2019-07-15 RX ORDER — SODIUM CHLORIDE, SODIUM LACTATE, POTASSIUM CHLORIDE, CALCIUM CHLORIDE 600; 310; 30; 20 MG/100ML; MG/100ML; MG/100ML; MG/100ML
75 INJECTION, SOLUTION INTRAVENOUS CONTINUOUS
Status: DISCONTINUED | OUTPATIENT
Start: 2019-07-15 | End: 2019-07-15 | Stop reason: HOSPADM

## 2019-07-15 RX ORDER — BUSPIRONE HYDROCHLORIDE 5 MG/1
10 TABLET ORAL 2 TIMES DAILY
Status: DISCONTINUED | OUTPATIENT
Start: 2019-07-15 | End: 2019-07-16 | Stop reason: HOSPADM

## 2019-07-15 RX ORDER — SODIUM CHLORIDE 0.9 % (FLUSH) 0.9 %
5-40 SYRINGE (ML) INJECTION AS NEEDED
Status: DISCONTINUED | OUTPATIENT
Start: 2019-07-15 | End: 2019-07-16 | Stop reason: HOSPADM

## 2019-07-15 RX ORDER — POTASSIUM CHLORIDE 14.9 MG/ML
20 INJECTION INTRAVENOUS ONCE
Status: DISCONTINUED | OUTPATIENT
Start: 2019-07-15 | End: 2019-07-15

## 2019-07-15 RX ORDER — GLYCOPYRROLATE 0.2 MG/ML
INJECTION INTRAMUSCULAR; INTRAVENOUS AS NEEDED
Status: DISCONTINUED | OUTPATIENT
Start: 2019-07-15 | End: 2019-07-15 | Stop reason: HOSPADM

## 2019-07-15 RX ORDER — EPHEDRINE SULFATE 50 MG/ML
INJECTION, SOLUTION INTRAVENOUS AS NEEDED
Status: DISCONTINUED | OUTPATIENT
Start: 2019-07-15 | End: 2019-07-15 | Stop reason: HOSPADM

## 2019-07-15 RX ORDER — FENTANYL CITRATE 50 UG/ML
100 INJECTION, SOLUTION INTRAMUSCULAR; INTRAVENOUS AS NEEDED
Status: DISCONTINUED | OUTPATIENT
Start: 2019-07-15 | End: 2019-07-16 | Stop reason: HOSPADM

## 2019-07-15 RX ORDER — ONDANSETRON 2 MG/ML
4 INJECTION INTRAMUSCULAR; INTRAVENOUS ONCE
Status: DISCONTINUED | OUTPATIENT
Start: 2019-07-15 | End: 2019-07-15 | Stop reason: HOSPADM

## 2019-07-15 RX ORDER — HYDROMORPHONE HYDROCHLORIDE 2 MG/ML
0.5 INJECTION, SOLUTION INTRAMUSCULAR; INTRAVENOUS; SUBCUTANEOUS
Status: DISCONTINUED | OUTPATIENT
Start: 2019-07-15 | End: 2019-07-15 | Stop reason: HOSPADM

## 2019-07-15 RX ORDER — FLUOXETINE HYDROCHLORIDE 20 MG/1
40 CAPSULE ORAL AS NEEDED
Status: DISCONTINUED | OUTPATIENT
Start: 2019-07-15 | End: 2019-07-15

## 2019-07-15 RX ORDER — PANTOPRAZOLE SODIUM 40 MG/1
40 TABLET, DELAYED RELEASE ORAL
Status: DISCONTINUED | OUTPATIENT
Start: 2019-07-16 | End: 2019-07-16 | Stop reason: HOSPADM

## 2019-07-15 RX ORDER — CEFAZOLIN SODIUM/WATER 2 G/20 ML
2 SYRINGE (ML) INTRAVENOUS
Status: COMPLETED | OUTPATIENT
Start: 2019-07-15 | End: 2019-07-15

## 2019-07-15 RX ORDER — LIDOCAINE HYDROCHLORIDE 10 MG/ML
0.1 INJECTION INFILTRATION; PERINEURAL AS NEEDED
Status: DISCONTINUED | OUTPATIENT
Start: 2019-07-15 | End: 2019-07-16 | Stop reason: HOSPADM

## 2019-07-15 RX ORDER — BUPIVACAINE HYDROCHLORIDE AND EPINEPHRINE 5; 5 MG/ML; UG/ML
INJECTION, SOLUTION EPIDURAL; INTRACAUDAL; PERINEURAL AS NEEDED
Status: DISCONTINUED | OUTPATIENT
Start: 2019-07-15 | End: 2019-07-15 | Stop reason: HOSPADM

## 2019-07-15 RX ORDER — FLUOXETINE HYDROCHLORIDE 20 MG/1
40 CAPSULE ORAL DAILY
Status: DISCONTINUED | OUTPATIENT
Start: 2019-07-16 | End: 2019-07-16 | Stop reason: HOSPADM

## 2019-07-15 RX ORDER — ONDANSETRON 2 MG/ML
INJECTION INTRAMUSCULAR; INTRAVENOUS AS NEEDED
Status: DISCONTINUED | OUTPATIENT
Start: 2019-07-15 | End: 2019-07-15 | Stop reason: HOSPADM

## 2019-07-15 RX ORDER — SODIUM CHLORIDE, SODIUM LACTATE, POTASSIUM CHLORIDE, CALCIUM CHLORIDE 600; 310; 30; 20 MG/100ML; MG/100ML; MG/100ML; MG/100ML
1000 INJECTION, SOLUTION INTRAVENOUS CONTINUOUS
Status: DISCONTINUED | OUTPATIENT
Start: 2019-07-15 | End: 2019-07-16

## 2019-07-15 RX ORDER — HYDROMORPHONE HYDROCHLORIDE 2 MG/ML
0.5 INJECTION, SOLUTION INTRAMUSCULAR; INTRAVENOUS; SUBCUTANEOUS ONCE
Status: COMPLETED | OUTPATIENT
Start: 2019-07-15 | End: 2019-07-15

## 2019-07-15 RX ORDER — ERGOCALCIFEROL 1.25 MG/1
50000 CAPSULE ORAL
Status: DISCONTINUED | OUTPATIENT
Start: 2019-07-21 | End: 2019-07-16 | Stop reason: HOSPADM

## 2019-07-15 RX ORDER — SODIUM CHLORIDE, SODIUM LACTATE, POTASSIUM CHLORIDE, CALCIUM CHLORIDE 600; 310; 30; 20 MG/100ML; MG/100ML; MG/100ML; MG/100ML
75 INJECTION, SOLUTION INTRAVENOUS CONTINUOUS
Status: DISCONTINUED | OUTPATIENT
Start: 2019-07-15 | End: 2019-07-16

## 2019-07-15 RX ORDER — SIMVASTATIN 20 MG/1
10 TABLET, FILM COATED ORAL DAILY
Status: DISCONTINUED | OUTPATIENT
Start: 2019-07-16 | End: 2019-07-16 | Stop reason: HOSPADM

## 2019-07-15 RX ORDER — ROCURONIUM BROMIDE 10 MG/ML
INJECTION, SOLUTION INTRAVENOUS AS NEEDED
Status: DISCONTINUED | OUTPATIENT
Start: 2019-07-15 | End: 2019-07-15 | Stop reason: HOSPADM

## 2019-07-15 RX ORDER — ZOLPIDEM TARTRATE 5 MG/1
5 TABLET ORAL
Status: DISCONTINUED | OUTPATIENT
Start: 2019-07-15 | End: 2019-07-16 | Stop reason: HOSPADM

## 2019-07-15 RX ORDER — OXYCODONE HYDROCHLORIDE 5 MG/1
10 TABLET ORAL
Status: DISCONTINUED | OUTPATIENT
Start: 2019-07-15 | End: 2019-07-15 | Stop reason: HOSPADM

## 2019-07-15 RX ORDER — LIDOCAINE HYDROCHLORIDE 20 MG/ML
INJECTION, SOLUTION EPIDURAL; INFILTRATION; INTRACAUDAL; PERINEURAL AS NEEDED
Status: DISCONTINUED | OUTPATIENT
Start: 2019-07-15 | End: 2019-07-15 | Stop reason: HOSPADM

## 2019-07-15 RX ORDER — DEXAMETHASONE SODIUM PHOSPHATE 4 MG/ML
INJECTION, SOLUTION INTRA-ARTICULAR; INTRALESIONAL; INTRAMUSCULAR; INTRAVENOUS; SOFT TISSUE AS NEEDED
Status: DISCONTINUED | OUTPATIENT
Start: 2019-07-15 | End: 2019-07-15

## 2019-07-15 RX ORDER — MIDAZOLAM HYDROCHLORIDE 1 MG/ML
2 INJECTION, SOLUTION INTRAMUSCULAR; INTRAVENOUS
Status: ACTIVE | OUTPATIENT
Start: 2019-07-15 | End: 2019-07-16

## 2019-07-15 RX ADMIN — BUTALBITAL, ACETAMINOPHEN AND CAFFEINE 1 TABLET: 50; 325; 40 TABLET ORAL at 22:05

## 2019-07-15 RX ADMIN — PROPOFOL 200 MG: 10 INJECTION, EMULSION INTRAVENOUS at 12:00

## 2019-07-15 RX ADMIN — POTASSIUM CHLORIDE 20 MEQ: 200 INJECTION, SOLUTION INTRAVENOUS at 10:39

## 2019-07-15 RX ADMIN — HYDROMORPHONE HYDROCHLORIDE 0.5 MG: 2 INJECTION INTRAMUSCULAR; INTRAVENOUS; SUBCUTANEOUS at 13:30

## 2019-07-15 RX ADMIN — BUSPIRONE HYDROCHLORIDE 10 MG: 5 TABLET ORAL at 18:02

## 2019-07-15 RX ADMIN — Medication 10 ML: at 15:25

## 2019-07-15 RX ADMIN — Medication 1 AMPULE: at 16:21

## 2019-07-15 RX ADMIN — PROMETHAZINE HYDROCHLORIDE 6.25 MG: 25 INJECTION INTRAMUSCULAR; INTRAVENOUS at 13:48

## 2019-07-15 RX ADMIN — HYDROMORPHONE HYDROCHLORIDE 0.5 MG: 2 INJECTION INTRAMUSCULAR; INTRAVENOUS; SUBCUTANEOUS at 11:42

## 2019-07-15 RX ADMIN — ONDANSETRON 4 MG: 2 INJECTION INTRAMUSCULAR; INTRAVENOUS at 12:33

## 2019-07-15 RX ADMIN — FENTANYL CITRATE 50 MCG: 50 INJECTION, SOLUTION INTRAMUSCULAR; INTRAVENOUS at 12:00

## 2019-07-15 RX ADMIN — EPHEDRINE SULFATE 10 MG: 50 INJECTION, SOLUTION INTRAVENOUS at 12:43

## 2019-07-15 RX ADMIN — Medication 2 G: at 12:10

## 2019-07-15 RX ADMIN — LIDOCAINE HYDROCHLORIDE 80 MG: 20 INJECTION, SOLUTION EPIDURAL; INFILTRATION; INTRACAUDAL; PERINEURAL at 12:00

## 2019-07-15 RX ADMIN — NEOSTIGMINE METHYLSULFATE 3 MG: 1 INJECTION INTRAVENOUS at 13:12

## 2019-07-15 RX ADMIN — ROCURONIUM BROMIDE 40 MG: 10 INJECTION, SOLUTION INTRAVENOUS at 12:00

## 2019-07-15 RX ADMIN — SODIUM CHLORIDE, SODIUM LACTATE, POTASSIUM CHLORIDE, AND CALCIUM CHLORIDE 1000 ML: 600; 310; 30; 20 INJECTION, SOLUTION INTRAVENOUS at 09:18

## 2019-07-15 RX ADMIN — GLYCOPYRROLATE 0.4 MG: 0.2 INJECTION INTRAMUSCULAR; INTRAVENOUS at 13:12

## 2019-07-15 RX ADMIN — BUTALBITAL, ACETAMINOPHEN AND CAFFEINE 1 TABLET: 50; 325; 40 TABLET ORAL at 16:21

## 2019-07-15 RX ADMIN — VANCOMYCIN HYDROCHLORIDE 1000 MG: 1 INJECTION, POWDER, LYOPHILIZED, FOR SOLUTION INTRAVENOUS at 09:25

## 2019-07-15 RX ADMIN — Medication 3 AMPULE: at 09:25

## 2019-07-15 RX ADMIN — HYDROMORPHONE HYDROCHLORIDE 0.5 MG: 2 INJECTION INTRAMUSCULAR; INTRAVENOUS; SUBCUTANEOUS at 13:35

## 2019-07-15 RX ADMIN — FENTANYL CITRATE 50 MCG: 50 INJECTION, SOLUTION INTRAMUSCULAR; INTRAVENOUS at 12:32

## 2019-07-15 RX ADMIN — PROMETHAZINE HYDROCHLORIDE 12.5 MG: 25 INJECTION INTRAMUSCULAR; INTRAVENOUS at 19:27

## 2019-07-15 RX ADMIN — ROCURONIUM BROMIDE 10 MG: 10 INJECTION, SOLUTION INTRAVENOUS at 12:27

## 2019-07-15 RX ADMIN — Medication 1 AMPULE: at 21:28

## 2019-07-15 NOTE — BRIEF OP NOTE
BRIEF OPERATIVE NOTE    Date of Procedure: 7/15/2019   Preoperative Diagnosis: CSF leak [G96.0]  Postoperative Diagnosis: CSF leak [G96.0]    Procedure(s):  LUMBAR LAMINECTOMY FOR REPAIR OF CSF LEAK  Surgeon(s) and Role:     * Fer Perez MD - Primary         Surgical Assistant: NONE    Surgical Staff:  Circ-1: Billy Love RN  Scrub Tech-1: Colleen Grider  Scrub Private/Assistant: Tate Thomas  Event Time In Time Out   Incision Start 1215    Incision Close       Anesthesia: General   Estimated Blood Loss: MINIMAL  Specimens: * No specimens in log *   Findings: TEAR FROM BONE   Complications: NONE  Implants:   Implant Name Type Inv.  Item Serial No.  Lot No. LRB No. Used Action   GRAFT DURA MATRX RESRB 2X2 IN -- DURAGEN ORDER AS EA - [id number]  GRAFT DURA MATRX RESRB 2X2 IN -- DURAGEN ORDER AS EA  [employer] [id number] N/A 1 Implanted

## 2019-07-15 NOTE — ROUTINE PROCESS
TRANSFER - OUT REPORT: 
 
Verbal report given to Jacobo Bryson  being transferred to [id number] for routine post - op Report consisted of patients Situation, Background, Assessment and  
Recommendations(SBAR). Information from the following report(s) SBAR, Procedure Summary and Cardiac Rhythm NSR was reviewed with the receiving nurse. Lines:  
Peripheral IV 07/15/19 Anterior;Right Wrist (Active) Site Assessment Clean, dry, & intact 7/15/2019  2:09 PM  
Phlebitis Assessment 0 7/15/2019  2:09 PM  
Infiltration Assessment 0 7/15/2019  2:09 PM  
Dressing Status Clean, dry, & intact 7/15/2019  2:09 PM  
Dressing Type Transparent;Tape 7/15/2019  2:09 PM  
Hub Color/Line Status Pink; Infusing 7/15/2019  2:09 PM  
Action Taken Blood drawn 7/15/2019  9:17 AM  
Alcohol Cap Used Yes 7/15/2019  2:09 PM  
  
 
Opportunity for questions and clarification was provided. Patient transported with: 
 Tech 
 
VTE prophylaxis orders have been written for [place]. Patient and family given floor number and nurses name. Family updated re: pt status after security code verified.

## 2019-07-15 NOTE — PROGRESS NOTES
07/15/19 1525   Dual Skin Pressure Injury Assessment   Dual Skin Pressure Injury Assessment WDL   Second Care Provider (Based on [address] [place] [address]) Mya Letters RN   Surgical site to back

## 2019-07-15 NOTE — ANESTHESIA POSTPROCEDURE EVALUATION
Procedure(s):  LUMBAR LAMINECTOMY FOR REPAIR OF CSF LEAK. general    Anesthesia Post Evaluation      Multimodal analgesia: multimodal analgesia used between 6 hours prior to anesthesia start to PACU discharge  Patient location during evaluation: PACU  Patient participation: complete - patient participated  Level of consciousness: awake and alert  Pain management: adequate  Airway patency: patent  Anesthetic complications: no  Cardiovascular status: acceptable  Respiratory status: acceptable  Hydration status: acceptable  Post anesthesia nausea and vomiting:  none      Vitals Value Taken Time   /57 7/15/2019  2:40 PM   Temp 36.7 °C (98.1 °F) 7/15/2019  1:27 PM   Pulse 89 7/15/2019  2:41 PM   Resp 14 7/15/2019  2:41 PM   SpO2 97 % 7/15/2019  2:41 PM   Vitals shown include unvalidated device data.

## 2019-07-15 NOTE — ANESTHESIA PREPROCEDURE EVALUATION
Anesthetic History   No history of anesthetic complications            Review of Systems / Medical History  Patient summary reviewed and pertinent labs reviewed    Pulmonary        Sleep apnea: No treatment  Smoker (Former Quit 2018)      Comments: States she no longer has DAMIAN   Neuro/Psych         Psychiatric history     Cardiovascular    Hypertension: well controlled          Hyperlipidemia    Exercise tolerance: >4 METS     GI/Hepatic/Renal     GERD: well controlled           Endo/Other             Other Findings   Comments: S/p Gastric Bypass    CSF leak             Physical Exam    Airway  Mallampati: II  TM Distance: 4 - 6 cm  Neck ROM: normal range of motion   Mouth opening: Normal     Cardiovascular    Rhythm: regular  Rate: normal         Dental  No notable dental hx       Pulmonary  Breath sounds clear to auscultation               Abdominal  GI exam deferred       Other Findings            Anesthetic Plan    ASA: 2, emergent  Anesthesia type: general          Induction: Intravenous  Anesthetic plan and risks discussed with: Patient

## 2019-07-16 VITALS
HEIGHT: 63 IN | BODY MASS INDEX: 27.38 KG/M2 | SYSTOLIC BLOOD PRESSURE: 97 MMHG | RESPIRATION RATE: 18 BRPM | DIASTOLIC BLOOD PRESSURE: 65 MMHG | HEART RATE: 67 BPM | OXYGEN SATURATION: 91 % | TEMPERATURE: 98.5 F | WEIGHT: 154.5 LBS

## 2019-07-16 LAB
ANION GAP SERPL CALC-SCNC: 6 MMOL/L (ref 7–16)
BUN SERPL-MCNC: 9 MG/DL (ref 6–23)
CALCIUM SERPL-MCNC: 8.3 MG/DL (ref 8.3–10.4)
CHLORIDE SERPL-SCNC: 105 MMOL/L (ref 98–107)
CO2 SERPL-SCNC: 32 MMOL/L (ref 21–32)
CREAT SERPL-MCNC: 0.74 MG/DL (ref 0.6–1)
GLUCOSE SERPL-MCNC: 103 MG/DL (ref 65–100)
POTASSIUM SERPL-SCNC: 3.4 MMOL/L (ref 3.5–5.1)
SODIUM SERPL-SCNC: 143 MMOL/L (ref 136–145)

## 2019-07-16 PROCEDURE — 77030020255 HC SOL INJ LR 1000ML BG

## 2019-07-16 PROCEDURE — 80048 BASIC METABOLIC PNL TOTAL CA: CPT

## 2019-07-16 PROCEDURE — 36415 COLL VENOUS BLD VENIPUNCTURE: CPT

## 2019-07-16 PROCEDURE — 74011250636 HC RX REV CODE- 250/636: Performed by: NEUROLOGICAL SURGERY

## 2019-07-16 PROCEDURE — 74011250637 HC RX REV CODE- 250/637: Performed by: NEUROLOGICAL SURGERY

## 2019-07-16 RX ORDER — HYDROCODONE BITARTRATE AND ACETAMINOPHEN 7.5; 325 MG/1; MG/1
1 TABLET ORAL
Qty: 15 TAB | Refills: 0 | Status: SHIPPED | OUTPATIENT
Start: 2019-07-16 | End: 2019-07-21

## 2019-07-16 RX ADMIN — DULOXETINE HYDROCHLORIDE 30 MG: 30 CAPSULE, DELAYED RELEASE ORAL at 08:52

## 2019-07-16 RX ADMIN — BUTALBITAL, ACETAMINOPHEN AND CAFFEINE 1 TABLET: 50; 325; 40 TABLET ORAL at 08:55

## 2019-07-16 RX ADMIN — VANCOMYCIN HYDROCHLORIDE 1000 MG: 1 INJECTION, POWDER, LYOPHILIZED, FOR SOLUTION INTRAVENOUS at 09:01

## 2019-07-16 RX ADMIN — Medication 10 ML: at 03:36

## 2019-07-16 RX ADMIN — BUSPIRONE HYDROCHLORIDE 10 MG: 5 TABLET ORAL at 08:52

## 2019-07-16 RX ADMIN — SODIUM CHLORIDE, SODIUM LACTATE, POTASSIUM CHLORIDE, AND CALCIUM CHLORIDE 75 ML/HR: 600; 310; 30; 20 INJECTION, SOLUTION INTRAVENOUS at 05:27

## 2019-07-16 RX ADMIN — SIMVASTATIN 10 MG: 20 TABLET, FILM COATED ORAL at 08:52

## 2019-07-16 RX ADMIN — Medication 1 AMPULE: at 09:04

## 2019-07-16 RX ADMIN — B-COMPLEX W/ C & FOLIC ACID TAB 1 TABLET: TAB at 08:52

## 2019-07-16 RX ADMIN — FLUOXETINE 40 MG: 20 CAPSULE ORAL at 08:52

## 2019-07-16 RX ADMIN — BUTALBITAL, ACETAMINOPHEN AND CAFFEINE 1 TABLET: 50; 325; 40 TABLET ORAL at 03:36

## 2019-07-16 RX ADMIN — PANTOPRAZOLE SODIUM 40 MG: 40 TABLET, DELAYED RELEASE ORAL at 03:35

## 2019-07-16 NOTE — PROGRESS NOTES
Pt's D/C instructions completed. Verbalized understanding of all instructions including diet, activity, s/sx to alert MD, medications, wound care, and f/u appointment. Family at [facility].

## 2019-07-16 NOTE — PROGRESS NOTES
NS   POD#1  AFEBRILE  DOING WELL  NO  HA  FLAT DRESSING  5/5 POWER  A/P MOBILIZE  RAFFAELE Mathis MD

## 2019-07-16 NOTE — OP NOTES
[facility]  OPERATIVE REPORT    Name:  Brooklynn Uribe  MR#:  [id number]  :  1960  ACCOUNT #:  [de-identified]  DATE OF SERVICE:  07/15/2019    PREOPERATIVE DIAGNOSIS:  Postoperative cerebrospinal fluid fistula, lumbar spine. POSTOPERATIVE DIAGNOSIS:  Postoperative cerebrospinal fluid fistula, lumbar spine. PROCEDURE PERFORMED:  Right L4-5 laminectomy and facetectomy, with repair of CSF fistula. SURGEON:  Osbaldo Latif MD    ASSISTANT:  None. ANESTHESIA:  General endotracheal.    COMPLICATIONS:  None. SPECIMENS REMOVED:  None. IMPLANTS:  None. ESTIMATED BLOOD LOSS:  Minimal.    PREPARATION:  ChloraPrep. HISTORY OF PRESENT ILLNESS:  A 63-year lady status post back surgery who developed delayed onset of CSF fistula. She is taken back to the operating room and no leak was found. The dura was passed. However, she developed a repeat leakage 5 weeks later and now enters the hospital.  MRI scanning does confirm fluid emanating from the right L4-5 region consistent with postoperative CSF fistula. OPERATIVE NOTE:  The patient was brought to the operating room, was carefully placed under general endotracheal anesthesia without complications, carefully turned prone on the Cloward frame and the posterior aspect of the back was shaved and prepped in the usual sterile fashion. The incision was swollen. It was incised with a 15-blade and clear CSF fluid emanated under pressure. Deep retractors were placed. Medial facetectomy was carried out with a 3-mm Kerrison rongeur and additional laminectomy at that level as well. The L4 nerve root was identified. It was free of any holes, tears or contusions. Just rostral to the L4 nerve root was noted a small area of erosion from the bone into the dura consistent with the CSF fistula. Bone had to be dissected away with the [facility] and resected with 2 and 3 mm Kerrison rongeurs to expose the small hole.   4-0 Nurolon was used. Two sutures were placed to repair the hole. Positive pressure breathing revealed no sign of additional fluid leakage, once the hole was repaired. Wound was irrigated until clear. Thrombin-soaked Gelfoam was used in the epidural space. Two pieces of Duragen were placed over the exposed hole. DuraSeal was placed as well. Positive pressure breathing revealed no sign of CSF leak at this point. The wound was irrigated until clear and closed. The fascia was closed tightly with interrupted 0 Vicryl. Subcutaneous tissues were closed with interrupted 3-0 Vicryl. Skin was closed with Exofin tape and pressure. The patient tolerated the procedure well, was turned supine, awakened, extubated, and taken to PACU in stable condition. The were no obvious complications.       Lizabeth Paredes MD      [id number]  D:  07/15/2019 13:17  T:  07/15/2019 13:20  JOB #:  [id number]  CC:

## 2019-07-16 NOTE — DISCHARGE INSTRUCTIONS
MAY shower-->NO tub baths    CHANGE dressing DAILY--remove dressing-->shower-->apply new dressing    NO lifting anything heavier than 5LBS     NO Bending, Lifting or Twisting    Avoid sitting more than 20 - 30 minutes at a time    NO driving until directed by Dr. Kelly Butler    DO NOT take any NSAIDS (either prescribed or over the counter until directed    (Aleve, Ibuprofen, Mobic, etc) as this will interfere with bone healing    CALL DR. Kelly Butler if:  Fever >100.5  ([phone])               Incision becomes red, swollen or opens up             Incision has yellow, thick drainage or an odor             Pain is not managed with prescribed medications             Excessive nausea and/or vomiting    Avoid having pets sleep in bed with you until incision is completely healed      DISCHARGE SUMMARY from Nurse    PATIENT INSTRUCTIONS:    After general anesthesia or intravenous sedation, for 24 hours or while taking prescription Narcotics:  · Limit your activities  · Do not drive and operate hazardous machinery  · Do not make important personal or business decisions  · Do  not drink alcoholic beverages  · If you have not urinated within 8 hours after discharge, please contact your surgeon on call.     Report the following to your surgeon:  · Excessive pain, swelling, redness or odor of or around the surgical area  · Temperature over 100.5  · Nausea and vomiting lasting longer than 4 hours or if unable to take medications  · Any signs of decreased circulation or nerve impairment to extremity: change in color, persistent  numbness, tingling, coldness or increase pain  · Any questions    What to do at Home:  Recommended activity: Per MD/PT/OT instructions    If you experience any of the following symptoms fever > 100.5, nausea, vomiting, pain without relief of pain medications call MD, chest pain and/or shortness of breath to ER please follow up with MD.    *  Please give a list of your current medications to your Primary Care Provider. *  Please update this list whenever your medications are discontinued, doses are      changed, or new medications (including over-the-counter products) are added. *  Please carry medication information at all times in case of emergency situations. These are general instructions for a healthy lifestyle:    No smoking/ No tobacco products/ Avoid exposure to second hand smoke  Surgeon General's Warning:  Quitting smoking now greatly reduces serious risk to your health. Obesity, smoking, and sedentary lifestyle greatly increases your risk for illness    A healthy diet, regular physical exercise & weight monitoring are important for maintaining a healthy lifestyle    You may be retaining fluid if you have a history of heart failure or if you experience any of the following symptoms:  Weight gain of 3 pounds or more overnight or 5 pounds in a week, increased swelling in our hands or feet or shortness of breath while lying flat in bed. Please call your doctor as soon as you notice any of these symptoms; do not wait until your next office visit. The discharge information has been reviewed with the patient. The patient verbalized understanding. Discharge medications reviewed with the patient and appropriate educational materials and side effects teaching were provided.   ___________________________________________________________________________________________________________________________________

## 2019-11-14 PROBLEM — M54.2 NECK PAIN: Status: ACTIVE | Noted: 2019-11-14

## 2019-11-14 PROBLEM — M54.10 RADICULOPATHY: Status: ACTIVE | Noted: 2019-03-05

## 2019-12-04 ENCOUNTER — HOSPITAL ENCOUNTER (OUTPATIENT)
Dept: MRI IMAGING | Age: 59
Discharge: HOME | End: 2019-12-04
Attending: NEUROLOGICAL SURGERY
Payer: SUBSIDIZED

## 2019-12-04 DIAGNOSIS — M54.10 RADICULOPATHY, UNSPECIFIED SPINAL REGION: ICD-10-CM

## 2019-12-04 DIAGNOSIS — M54.16 LUMBAR RADICULOPATHY: ICD-10-CM

## 2019-12-04 DIAGNOSIS — M48.062 SPINAL STENOSIS OF LUMBAR REGION WITH NEUROGENIC CLAUDICATION: ICD-10-CM

## 2019-12-04 DIAGNOSIS — M54.2 NECK PAIN: ICD-10-CM

## 2019-12-04 PROCEDURE — 72141 MRI NECK SPINE W/O DYE: CPT

## 2019-12-04 PROCEDURE — 72148 MRI LUMBAR SPINE W/O DYE: CPT

## 2019-12-10 PROBLEM — S22.080A COMPRESSION FRACTURE OF T12 VERTEBRA (HCC): Status: ACTIVE | Noted: 2019-12-10

## 2020-02-14 ENCOUNTER — HOSPITAL ENCOUNTER (OUTPATIENT)
Dept: MAMMOGRAPHY | Age: 60
Discharge: HOME | End: 2020-02-14
Attending: NEUROLOGICAL SURGERY
Payer: SUBSIDIZED

## 2020-02-14 DIAGNOSIS — S22.080A COMPRESSION FRACTURE OF T12 VERTEBRA, INITIAL ENCOUNTER (HCC): ICD-10-CM

## 2020-02-14 PROCEDURE — 77080 DXA BONE DENSITY AXIAL: CPT

## 2020-02-18 PROBLEM — M81.0 OSTEOPOROSIS: Status: ACTIVE | Noted: 2020-02-18

## 2020-05-28 ENCOUNTER — HOSPITAL ENCOUNTER (OUTPATIENT)
Dept: MRI IMAGING | Age: 60
Discharge: HOME | End: 2020-05-28
Attending: NEUROLOGICAL SURGERY
Payer: COMMERCIAL

## 2020-05-28 DIAGNOSIS — M48.062 SPINAL STENOSIS OF LUMBAR REGION WITH NEUROGENIC CLAUDICATION: ICD-10-CM

## 2020-05-28 DIAGNOSIS — M54.10 RADICULOPATHY, UNSPECIFIED SPINAL REGION: ICD-10-CM

## 2020-05-28 DIAGNOSIS — M54.16 LUMBAR RADICULOPATHY: ICD-10-CM

## 2020-05-28 DIAGNOSIS — M48.062 LUMBAR STENOSIS WITH NEUROGENIC CLAUDICATION: ICD-10-CM

## 2020-05-28 PROCEDURE — 74011250636 HC RX REV CODE- 250/636: Performed by: NEUROLOGICAL SURGERY

## 2020-05-28 PROCEDURE — 72158 MRI LUMBAR SPINE W/O & W/DYE: CPT

## 2020-05-28 PROCEDURE — A9575 INJ GADOTERATE MEGLUMI 0.1ML: HCPCS | Performed by: NEUROLOGICAL SURGERY

## 2020-05-28 RX ORDER — SODIUM CHLORIDE 0.9 % (FLUSH) 0.9 %
10 SYRINGE (ML) INJECTION
Status: COMPLETED | OUTPATIENT
Start: 2020-05-28 | End: 2020-05-28

## 2020-05-28 RX ORDER — GADOTERATE MEGLUMINE 376.9 MG/ML
12 INJECTION INTRAVENOUS
Status: COMPLETED | OUTPATIENT
Start: 2020-05-28 | End: 2020-05-28

## 2020-05-28 RX ADMIN — GADOTERATE MEGLUMINE 12 ML: 376.9 INJECTION INTRAVENOUS at 19:30

## 2020-05-28 RX ADMIN — Medication 10 ML: at 19:30

## 2020-06-30 PROBLEM — M54.2 NECK PAIN: Status: RESOLVED | Noted: 2019-11-14 | Resolved: 2020-06-30

## 2020-06-30 PROBLEM — M54.42 ACUTE BILATERAL LOW BACK PAIN WITH BILATERAL SCIATICA: Status: RESOLVED | Noted: 2019-01-31 | Resolved: 2020-06-30

## 2020-06-30 PROBLEM — M54.41 ACUTE BILATERAL LOW BACK PAIN WITH BILATERAL SCIATICA: Status: RESOLVED | Noted: 2019-01-31 | Resolved: 2020-06-30

## 2020-08-19 ENCOUNTER — HOSPITAL ENCOUNTER (OUTPATIENT)
Dept: MAMMOGRAPHY | Age: 60
Discharge: HOME | End: 2020-08-19
Attending: INTERNAL MEDICINE
Payer: COMMERCIAL

## 2020-08-19 DIAGNOSIS — Z12.31 VISIT FOR SCREENING MAMMOGRAM: ICD-10-CM

## 2020-08-19 PROCEDURE — 77067 SCR MAMMO BI INCL CAD: CPT

## 2021-01-28 PROBLEM — G25.81 RESTLESS LEG SYNDROME: Status: ACTIVE | Noted: 2021-01-28

## 2021-02-12 ENCOUNTER — TRANSCRIBE ORDER (OUTPATIENT)
Dept: SCHEDULING | Age: 61
End: 2021-02-12

## 2021-02-12 DIAGNOSIS — S22.080S COMPRESSION FRACTURE OF T12 VERTEBRA, SEQUELA: ICD-10-CM

## 2021-02-12 DIAGNOSIS — M48.061 SPINAL STENOSIS AT L4-L5 LEVEL: Primary | ICD-10-CM

## 2021-08-05 PROBLEM — R73.01 IFG (IMPAIRED FASTING GLUCOSE): Status: ACTIVE | Noted: 2021-08-05

## 2021-08-31 PROBLEM — G89.4 CHRONIC PAIN SYNDROME: Status: ACTIVE | Noted: 2021-08-31

## 2021-09-03 ENCOUNTER — HOSPITAL ENCOUNTER (EMERGENCY)
Age: 61
Discharge: HOME | End: 2021-09-03
Admitting: EMERGENCY MEDICINE
Payer: COMMERCIAL

## 2021-09-03 VITALS
DIASTOLIC BLOOD PRESSURE: 89 MMHG | OXYGEN SATURATION: 97 % | HEART RATE: 99 BPM | WEIGHT: 122 LBS | SYSTOLIC BLOOD PRESSURE: 124 MMHG | BODY MASS INDEX: 22.45 KG/M2 | RESPIRATION RATE: 18 BRPM | TEMPERATURE: 98.8 F | HEIGHT: 62 IN

## 2021-09-03 DIAGNOSIS — B34.9 VIRAL ILLNESS: Primary | ICD-10-CM

## 2021-09-03 LAB — SARS-COV-2, COV2: NORMAL

## 2021-09-03 PROCEDURE — 99282 EMERGENCY DEPT VISIT SF MDM: CPT

## 2021-09-03 PROCEDURE — U0004 COV-19 TEST NON-CDC HGH THRU: HCPCS

## 2021-09-03 RX ORDER — ONDANSETRON 4 MG/1
4 TABLET, ORALLY DISINTEGRATING ORAL
Qty: 11 TABLET | Refills: 1 | Status: SHIPPED | OUTPATIENT
Start: 2021-09-03

## 2021-09-03 NOTE — ED NOTES
I have reviewed discharge instructions with the patient. The patient verbalized understanding. Patient left ED via Discharge Method: ambulatory to Home. Opportunity for questions and clarification provided. Patient given 1 scripts. Zofran. Work excuse given. Push fluids. Tylenol/motrin for pain/fever. Shown how to obtain COVID PCR results on MyChart. To continue your aftercare when you leave the hospital, you may receive an automated call from our care team to check in on how you are doing. This is a free service and part of our promise to provide the best care and service to meet your aftercare needs.  If you have questions, or wish to unsubscribe from this service please call [phone]. Thank you for Choosing our [facility] Emergency Department.

## 2021-09-03 NOTE — ED TRIAGE NOTES
Pt reports nausea, vomiting and diarrhea, with body aches and headache. Onset yesterday. Pt reports  recently dx with covid.   Pt not vaccinated

## 2021-09-03 NOTE — ED PROVIDER NOTES
Pt c/o headache  And  Body aches, and nv started last night,  dx with  covid last week, no vaccine     The history is provided by the patient. Vomiting   This is a new problem. The current episode started yesterday. The problem occurs 2 to 4 times per day. The problem has not changed since onset. The emesis has an appearance of stomach contents. There has been no fever. Associated symptoms include myalgias. The patient is not pregnant. Risk factors include ill contacts.         Past Medical History:   Diagnosis Date    Anxiety     Back problem     Depression     Ex-smoker for less than 1 year     Quit 1/2018  1 ppd x 30 yrs    Family history of anesthesia complication     Patient's father developed an arrhythmia in PACU s/p hip arthroplasty    GERD (gastroesophageal reflux disease)     well controlled with meds    History of bone density study 02/2020    osteoporosis    History of mammogram 08/2020    History of Papanicolaou smear of cervix >5 years aog    Hyperlipidemia     Hypertension     hctz    Nicotine vapor product user     0.9%    Sleep apnea     no CPAP       Past Surgical History:   Procedure Laterality Date    HX BREAST AUGMENTATION  2014    HX BREAST BIOPSY Left     HX GASTRIC BYPASS  2014    Dr. Chey Castanon    HX [address]  ~1999    HX LUMBAR LAMINECTOMY  03/15/2019    right L4-5 Lami    HX LUMBAR LAMINECTOMY  07/15/2019    right L4-5 lami repair of CSF fistula    HX CHARO AND BSO      IMPLANT BREAST SILICONE/EQ Bilateral 1224    NEUROLOGICAL PROCEDURE UNLISTED  03/29/2019    repair of CSF leak right L4-5    DE TOTAL ABDOM HYSTERECTOMY           Family History:   Problem Relation Age of Onset    Breast Cancer Mother 79    Hypertension Mother     Stroke Father     Hypertension Father     Stroke Maternal Grandmother     Breast Cancer Maternal Grandmother 76    Stroke Maternal Grandfather     Stroke Paternal Grandmother     Stroke Paternal Grandfather        Social History     Socioeconomic History    Marital status:      Spouse name: Not on file    Number of children: 0    Years of education: Not on file    Highest education level: Not on file   Occupational History    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, collections   Tobacco Use    Smoking status: Former Smoker     Packs/day: 1.00     Years: 30.00     Pack years: 30.00     Types: Cigarettes     Quit date: 01/2018     Years since quitting: 3.6    Smokeless tobacco: Never Used   Vaping Use    Vaping Use: Never used   Substance and Sexual Activity    Alcohol use: Not Currently    Drug use: Yes     Types: Marijuana     Comment: per pt at front window 5-7-19- she admits she smoked marijuana in March of 2019    Sexual activity: Not on file   Other Topics Concern    Not on file   Social History Narrative    Not on file     Social Determinants of Health     Financial Resource Strain:     Difficulty of Paying Living Expenses:    Food Insecurity:     Worried About [address] in the Last Year:     [address] [employer] in the Last Year:    Transportation Needs:     Lack of Transportation (Medical):  Lack of Transportation (Non-Medical):    Physical Activity:     Days of Exercise per Week:     Minutes of Exercise per Session:    Stress:     Feeling of Stress :    Social Connections:     Frequency of Communication with Friends and Family:     Frequency of Social Gatherings with Friends and Family:     Attends [employer] Services:     Active Member of Clubs or Organizations:     Attends Club or Organization Meetings:     Marital Status:    Intimate Partner Violence:     Fear of Current or Ex-Partner:     Emotionally Abused:     Physically Abused:     Sexually Abused: ALLERGIES: Penicillins    Review of Systems   Gastrointestinal: Positive for vomiting. Musculoskeletal: Positive for myalgias. All other systems reviewed and are negative. There were no vitals filed for this visit. Physical Exam  Vitals and nursing note reviewed. Constitutional:       General: She is not in acute distress. Appearance: Normal appearance. She is well-developed and normal weight. She is not diaphoretic. HENT:      Head: Normocephalic and atraumatic. Right Ear: External ear normal.      Left Ear: External ear normal.   Eyes:      Pupils: Pupils are equal, round, and reactive to light. Cardiovascular:      Rate and Rhythm: Normal rate and regular rhythm. Pulmonary:      Effort: Pulmonary effort is normal.      Breath sounds: Normal breath sounds. No wheezing or rhonchi. Abdominal:      General: Bowel sounds are normal.      Palpations: Abdomen is soft. Musculoskeletal:         General: Normal range of motion. Cervical back: Normal range of motion and neck supple. Skin:     General: Skin is warm. Neurological:      General: No focal deficit present. Mental Status: She is alert and oriented to person, place, and time. Psychiatric:         Mood and Affect: Mood normal.         Behavior: Behavior normal.          MDM  Number of Diagnoses or Management Options  Diagnosis management comments:  Will covid test, rx for zofran, work note given        Amount and/or Complexity of Data Reviewed  Clinical lab tests: ordered  Review and summarize past medical records: yes    Risk of Complications, Morbidity, and/or Mortality  Presenting problems: low  Diagnostic procedures: low  Management options: low    Patient Progress  Patient progress: improved         Procedures

## 2021-09-03 NOTE — DISCHARGE INSTRUCTIONS
Use meds as directed, use otc multivitamins, see your primary md for recheck, check your [facility] yanira for test results

## 2021-09-03 NOTE — LETTER
[place]  [facility] EMERGENCY DEPT  [address] [address] [phone]    Work/School Note    Date: 9/3/2021     To Whom It May concern:    Caity Patel was evaulated by the following provider(s):  No providers found. COVID19 virus is suspected. Per the CDC guidelines we recommend home isolation until the following conditions are all met:    1. At least 10 days have passed since symptoms first appeared and  2. At least 24 hours have passed since last fever without the use of fever-reducing medications and  3.  Symptoms (e.g., cough, shortness of breath) have improved    Sincerely,          SLIM Menchaca

## 2021-09-05 LAB — SARS COV-2, XPGCVT: NEGATIVE

## 2021-09-21 PROBLEM — M51.36 DDD (DEGENERATIVE DISC DISEASE), LUMBAR: Status: ACTIVE | Noted: 2021-09-21

## 2021-11-11 ENCOUNTER — HOSPITAL ENCOUNTER (EMERGENCY)
Age: 61
Discharge: HOME | End: 2021-11-11
Attending: EMERGENCY MEDICINE
Payer: COMMERCIAL

## 2021-11-11 ENCOUNTER — APPOINTMENT (OUTPATIENT)
Dept: GENERAL RADIOLOGY | Age: 61
End: 2021-11-11
Attending: STUDENT IN AN ORGANIZED HEALTH CARE EDUCATION/TRAINING PROGRAM
Payer: COMMERCIAL

## 2021-11-11 VITALS
TEMPERATURE: 98.1 F | HEART RATE: 101 BPM | OXYGEN SATURATION: 97 % | SYSTOLIC BLOOD PRESSURE: 119 MMHG | RESPIRATION RATE: 18 BRPM | BODY MASS INDEX: 22.08 KG/M2 | HEIGHT: 62 IN | WEIGHT: 120 LBS | DIASTOLIC BLOOD PRESSURE: 70 MMHG

## 2021-11-11 DIAGNOSIS — G89.29 CHRONIC THORACIC BACK PAIN, UNSPECIFIED BACK PAIN LATERALITY: Primary | ICD-10-CM

## 2021-11-11 DIAGNOSIS — M54.6 CHRONIC THORACIC BACK PAIN, UNSPECIFIED BACK PAIN LATERALITY: Primary | ICD-10-CM

## 2021-11-11 PROCEDURE — 96372 THER/PROPH/DIAG INJ SC/IM: CPT

## 2021-11-11 PROCEDURE — 74011250636 HC RX REV CODE- 250/636: Performed by: EMERGENCY MEDICINE

## 2021-11-11 PROCEDURE — 99283 EMERGENCY DEPT VISIT LOW MDM: CPT

## 2021-11-11 PROCEDURE — 73562 X-RAY EXAM OF KNEE 3: CPT

## 2021-11-11 PROCEDURE — 72100 X-RAY EXAM L-S SPINE 2/3 VWS: CPT

## 2021-11-11 RX ORDER — ROPINIROLE 1 MG/1
1 TABLET, FILM COATED ORAL DAILY
COMMUNITY
End: 2022-01-31 | Stop reason: SDUPTHER

## 2021-11-11 RX ORDER — KETOROLAC TROMETHAMINE 30 MG/ML
30 INJECTION, SOLUTION INTRAMUSCULAR; INTRAVENOUS
Status: COMPLETED | OUTPATIENT
Start: 2021-11-11 | End: 2021-11-11

## 2021-11-11 RX ADMIN — KETOROLAC TROMETHAMINE 30 MG: 30 INJECTION, SOLUTION INTRAMUSCULAR at 16:26

## 2021-11-11 NOTE — ED TRIAGE NOTES
Pt states she had a fall at work, states she hurt her left knee. States her lumbar back is hurting as well. Mask applied to pt.

## 2021-11-11 NOTE — ED NOTES
57 y.o. F with CC of LBP after a fall on Friday. She states she \"tripped over her own two feet\" while at work. She also reports knee pain. She denies LOC or head injury. She reports history of lumbar laminectomy. Patient evaluated initially in triage. Rapid Medical Evaluation was conducted and necessary orders have been placed. I have performed a medical screening exam.  Care will now be transferred to the attending physician in the emergency department.   Lisette Ge NP 3:53 PM

## 2021-11-11 NOTE — ED NOTES
I have reviewed discharge instructions with the patient. The patient verbalized understanding. Patient left ED via Discharge Method: ambulatory to Home with self. Opportunity for questions and clarification provided. Patient given 0 scripts. To continue your aftercare when you leave the hospital, you may receive an automated call from our care team to check in on how you are doing. This is a free service and part of our promise to provide the best care and service to meet your aftercare needs.  If you have questions, or wish to unsubscribe from this service please call [phone]. Thank you for Choosing our [facility] Emergency Department.

## 2021-11-11 NOTE — ED PROVIDER NOTES
Patient is a pleasant 62-year-old female presenting to the emergency department today complaining of back pain in the left paraspinal muscles after a fall on Friday. The patient said that she was walking to the printer just got tripped up and went down to her knees. She landed primarily in the left knee and her hands. She says since then she has had a flareup of her chronic back pain. She is on hydrocodone 10 mg tablets 3 times a day along with morphine twice daily. The patient also been taking NSAIDs. She has had 3 previous back surgeries most recent was 2019. The patient denies any loss control bowel or bladder or changes in her sensation of the lower extremities. She does have some permanent sensation loss in the right lower extremity medially.            Past Medical History:   Diagnosis Date    Anxiety     Back problem     Depression     Ex-smoker for less than 1 year     Quit 1/2018  1 ppd x 30 yrs    Family history of anesthesia complication     Patient's father developed an arrhythmia in PACU s/p hip arthroplasty    GERD (gastroesophageal reflux disease)     well controlled with meds    History of bone density study 02/2020    osteoporosis    History of mammogram 08/2020    History of Papanicolaou smear of cervix >5 years aog    Hyperlipidemia     Hypertension     hctz    Nicotine vapor product user     0.9%    Sleep apnea     no CPAP       Past Surgical History:   Procedure Laterality Date    HX BREAST AUGMENTATION  2014    HX BREAST BIOPSY Left     HX GASTRIC BYPASS  2014    Dr. 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    HX CHARO AND BSO      IMPLANT BREAST SILICONE/EQ Bilateral 0384    NEUROLOGICAL PROCEDURE UNLISTED  03/29/2019    repair of CSF leak right L4-5    [place] TOTAL ABDOM HYSTERECTOMY           Family History:   Problem Relation Age of Onset  Breast Cancer Mother 79    Hypertension Mother     Stroke Father     Hypertension Father     Stroke Maternal Grandmother     Breast Cancer Maternal Grandmother 76    Stroke Maternal Grandfather     Stroke Paternal Grandmother     Stroke Paternal Grandfather        Social History     Socioeconomic History    Marital status:      Spouse name: Not on file    Number of children: 0    Years of education: Not on file    Highest education level: Not on file   Occupational History    Occupation: [employer], [employer]   Tobacco Use    Smoking status: Former Smoker     Packs/day: 1.00     Years: 30.00     Pack years: 30.00     Types: Cigarettes     Quit date: 01/2018     Years since quitting: 3.8    Smokeless tobacco: Never Used   Vaping Use    Vaping Use: Never used   Substance and Sexual Activity    Alcohol use: Not Currently    Drug use: Yes     Types: Marijuana     Comment: per pt at front window 5-7-19- she admits she smoked marijuana in March of 2019    Sexual activity: Not on file   Other Topics Concern    Not on file   Social History Narrative    Not on file     Social Determinants of Health     Financial Resource Strain:     Difficulty of Paying Living Expenses: Not on file   Food Insecurity:     Worried About [address] in the Last Year: Not on file    [address] in the Last Year: Not on file   Transportation Needs:     Lack of Transportation (Medical): Not on file    Lack of Transportation (Non-Medical):  Not on file   Physical Activity:     Days of Exercise per Week: Not on file    Minutes of Exercise per Session: Not on file   Stress:     Feeling of Stress : Not on file   Social Connections:     Frequency of Communication with Friends and Family: Not on file    Frequency of Social Gatherings with Friends and Family: Not on file    Attends Advent Services: Not on file    Active Member of Clubs or Organizations: Not on file    Attends Club or Organization Meetings: Not on file    Marital Status: Not on file   Intimate Partner Violence:     Fear of Current or Ex-Partner: Not on file    Emotionally Abused: Not on file    Physically Abused: Not on file    Sexually Abused: Not on file   Housing Stability:     Unable to Pay for Housing in the Last Year: Not on file    Number of [place] in the Last Year: Not on file    Unstable Housing in the Last Year: Not on file         ALLERGIES: Penicillins    Review of Systems   Constitutional: Negative. Gastrointestinal: Negative. Genitourinary: Negative. Musculoskeletal: Positive for back pain. Skin: Negative. Neurological: Negative. Hematological: Negative. Vitals:    11/11/21 1549 11/11/21 1648   BP: 136/87 119/70   Pulse: (!) 121 (!) 101   Resp: 18 18   Temp: 98.1 °F (36.7 °C) 98.1 °F (36.7 °C)   SpO2: 98% 97%   Weight: 54.4 kg (120 lb)    Height: 5' 2\" (1.575 m)             Physical Exam     GENERAL:The patient has Body mass index is 21.95 kg/m². Well-hydrated. No acute distress. VITAL SIGNS: Heart rate, blood pressure, respiratory rate reviewed as recorded in  nurse's notes  EYES: Pupils reactive. Extraocular motion intact. No conjunctival redness or drainage. LUNGS: No accessory muscle use  CARDIOVASCULAR: Regular rate and rhythm  BACK: Reproducible tenderness palpation along the left lumbar paraspinal muscles. No tenderness palpation along the spinous processes in the cervical thoracic or lumbar spine. No step-off form is appreciated. EXTREMITIES: Pt moving all 4 extremities with out limitations. Normal muscle tone. NEUROLOGIC: Cranial nerve exam reveals face is symmetrical, tongue is midline  speech is clear. No focal deficits noted. No saddle anesthesia present. Decreased sensation along the right medial lower extremity. SKIN: No rash or petechiae. Good skin turgor palpated. PSYCHIATRIC: Alert and oriented. Appropriate behavior and judgment.       MDM  Number of Diagnoses or Management Options  Diagnosis management comments: Chronic back pain, contusion, myofascial strain, musculoskeletal injury, lumbar strain,  muscle spasm,    Disc herniation, compression fracture,    Retroperitoneal injury, renal colic, pyelonephritis, nephrolithiasis    Intraspinal tumor, chronic equinus syndrome, epidural compressive syndrome, epidural  abscess, abdominal aneurysm, ankylosing spondylitis,         Amount and/or Complexity of Data Reviewed  Tests in the radiology section of CPT®: reviewed and ordered  Tests in the medicine section of CPT®: reviewed and ordered  Review and summarize past medical records: yes  Independent visualization of images, tracings, or specimens: yes      ED Course as of 11/12/21 2211   Thu Nov 11, 2021   1617 XR KNEE LT 3 V  IMPRESSION  No acute radiographic abnormality. [KH]   1632 XR SPINE LUMB 2 OR 3 V  IMPRESSION  1. No acute abnormality evident by x-ray. 2.  Multilevel spondylosis and facet arthrosis. [KH]   1640 I talked to the patient about the findings in the emergency department on her imaging studies. She has the pain medicine that she needs at home and no changes will be made to her medication regimen at this time.  [KH]      ED Course User Index  [KH] Carmen Hampton DO       Procedures

## 2021-11-11 NOTE — DISCHARGE INSTRUCTIONS
Continue to follow with Dr. Mary Roberts as needed and do your exercises at home. Continue take your medications as needed for control of your pain. Risk of opioid analgesics include, but are not limited to: Overdose they can stop or slow your breathing and lead to death; fractures from falls; drowsiness leading to injury; tolerance, dependence and addiction. You should not operate any motorized vehicles or work from a height greater than ground level when taking opioid analgesics as they increase your fall risks.

## 2021-12-03 ENCOUNTER — APPOINTMENT (OUTPATIENT)
Dept: CT IMAGING | Age: 61
End: 2021-12-03
Attending: EMERGENCY MEDICINE
Payer: COMMERCIAL

## 2021-12-03 PROCEDURE — 99283 EMERGENCY DEPT VISIT LOW MDM: CPT

## 2021-12-03 PROCEDURE — 75810000293 HC SIMP/SUPERF WND  RPR

## 2021-12-04 ENCOUNTER — APPOINTMENT (OUTPATIENT)
Dept: CT IMAGING | Age: 61
End: 2021-12-04
Attending: EMERGENCY MEDICINE
Payer: COMMERCIAL

## 2021-12-04 ENCOUNTER — HOSPITAL ENCOUNTER (EMERGENCY)
Age: 61
Discharge: HOME | End: 2021-12-04
Attending: EMERGENCY MEDICINE
Payer: COMMERCIAL

## 2021-12-04 VITALS
TEMPERATURE: 98.2 F | OXYGEN SATURATION: 97 % | DIASTOLIC BLOOD PRESSURE: 72 MMHG | BODY MASS INDEX: 21.16 KG/M2 | RESPIRATION RATE: 18 BRPM | WEIGHT: 115 LBS | SYSTOLIC BLOOD PRESSURE: 122 MMHG | HEART RATE: 82 BPM | HEIGHT: 62 IN

## 2021-12-04 DIAGNOSIS — S01.01XA LACERATION OF SCALP, INITIAL ENCOUNTER: Primary | ICD-10-CM

## 2021-12-04 DIAGNOSIS — S09.90XA CLOSED HEAD INJURY, INITIAL ENCOUNTER: ICD-10-CM

## 2021-12-04 DIAGNOSIS — R91.1 LUNG NODULE: ICD-10-CM

## 2021-12-04 PROCEDURE — 72125 CT NECK SPINE W/O DYE: CPT

## 2021-12-04 PROCEDURE — 70450 CT HEAD/BRAIN W/O DYE: CPT

## 2021-12-04 PROCEDURE — 74011000250 HC RX REV CODE- 250: Performed by: EMERGENCY MEDICINE

## 2021-12-04 PROCEDURE — 75810000293 HC SIMP/SUPERF WND  RPR

## 2021-12-04 RX ORDER — LIDOCAINE HYDROCHLORIDE 10 MG/ML
10 INJECTION INFILTRATION; PERINEURAL ONCE
Status: COMPLETED | OUTPATIENT
Start: 2021-12-04 | End: 2021-12-04

## 2021-12-04 RX ADMIN — LIDOCAINE HYDROCHLORIDE 10 ML: 10 INJECTION, SOLUTION INFILTRATION; PERINEURAL at 02:47

## 2021-12-04 NOTE — ED NOTES
I have reviewed discharge instructions with the patient. The patient verbalized understanding. Patient left ED via Discharge Method: ambulatory to Home with family. Opportunity for questions and clarification provided. Patient given 0 scripts. To continue your aftercare when you leave the hospital, you may receive an automated call from our care team to check in on how you are doing. This is a free service and part of our promise to provide the best care and service to meet your aftercare needs.  If you have questions, or wish to unsubscribe from this service please call [phone]. Thank you for Choosing our [facility] Emergency Department.

## 2021-12-04 NOTE — DISCHARGE INSTRUCTIONS
Schedule close follow-up with PCP for wound recheck. Return to ED if symptoms worsen or progress in any way.    Will need follow-up head CT in 3 months for reevaluation of lung nodules

## 2021-12-04 NOTE — ED TRIAGE NOTES
Pt states that she had a mechanical fall from standing at about 2100. Pt hit the back of her head on a chair. Pt denies LOC, blood thinners, or changes in vision, numbness, or tingling. Pt states she had some initial dizziness but this has resolved.

## 2021-12-05 NOTE — ED PROVIDER NOTES
74-year-old female presents status post mechanical trip and fall at around 2100. Patient states that she hit the back of her head on a chair. Denies LOC, back pain, numbness, tingling, nausea, vomiting, fever, chills. Reports mild headache. Denies being on any anticoagulation. Denies vision disturbance, chest pain, shortness of breath, abdominal pain, dizziness, lightheadedness. Patient with no other complaints at this time. Denies any deformity to extremity or extremity pain or discomfort. States tetanus up-to-date. The history is provided by the patient. No  was used. Fall  The accident occurred 1 to 2 hours ago. The fall occurred while walking. She fell from a height of ground level. Impact surface: wooden chair  The volume of blood lost was minimal. The point of impact was the head. The pain is present in the head. The pain is at a severity of 3/10. The pain is mild. She was ambulatory at the scene. There was no entrapment after the fall. There was no drug use involved in the accident. There was no alcohol use involved in the accident. Associated symptoms include laceration. Pertinent negatives include no visual change, no fever, no numbness, no abdominal pain, no bowel incontinence, no nausea, no vomiting, no extremity weakness, no hearing loss, no loss of consciousness and no tingling. The patient's last tetanus shot was less than 5 years ago.        Past Medical History:   Diagnosis Date    Anxiety     Back problem     Depression     Ex-smoker for less than 1 year     Quit 1/2018  1 ppd x 30 yrs    Family history of anesthesia complication     Patient's father developed an arrhythmia in PACU s/p hip arthroplasty    GERD (gastroesophageal reflux disease)     well controlled with meds    History of bone density study 02/2020    osteoporosis    History of mammogram 08/2020    History of Papanicolaou smear of cervix >5 years aog    Hyperlipidemia     Hypertension hctz    Nicotine vapor product user     0.9%    Sleep apnea     no CPAP       Past Surgical History:   Procedure Laterality Date    HX BREAST AUGMENTATION  2014    HX BREAST BIOPSY Left     HX GASTRIC BYPASS  2014    Dr. 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    HX CHARO AND BSO      IMPLANT BREAST SILICONE/EQ Bilateral 7269    NEUROLOGICAL PROCEDURE UNLISTED  03/29/2019    repair of CSF leak right L4-5    [place] TOTAL ABDOM HYSTERECTOMY           Family History:   Problem Relation Age of Onset    Breast Cancer Mother 79    Hypertension Mother     Stroke Father     Hypertension Father     Stroke Maternal Grandmother     Breast Cancer Maternal Grandmother 76    Stroke Maternal Grandfather     Stroke Paternal Grandmother     Stroke Paternal Grandfather        Social History     Socioeconomic History    Marital status:      Spouse name: Not on file    Number of children: 0    Years of education: Not on file    Highest education level: Not on file   Occupational History    Occupation: [employer], [employer]   Tobacco Use    Smoking status: Former Smoker     Packs/day: 1.00     Years: 30.00     Pack years: 30.00     Types: Cigarettes     Quit date: 01/2018     Years since quitting: 3.9    Smokeless tobacco: Never Used   Vaping Use    Vaping Use: Never used   Substance and Sexual Activity    Alcohol use: Not Currently    Drug use: Yes     Types: Marijuana     Comment: per pt at front window 5-7-19- she admits she smoked marijuana in March of 2019    Sexual activity: Not on file   Other Topics Concern    Not on file   Social History Narrative    Not on file     Social Determinants of Health     Financial Resource Strain:     Difficulty of Paying Living Expenses: Not on file   Food Insecurity:     Worried About [address] in the Last Year: Not on file    Ran Out of Food in the Last Year: Not on file   Transportation Needs:     Lack of Transportation (Medical): Not on file    Lack of Transportation (Non-Medical): Not on file   Physical Activity:     Days of Exercise per Week: Not on file    Minutes of Exercise per Session: Not on file   Stress:     Feeling of Stress : Not on file   Social Connections:     Frequency of Communication with Friends and Family: Not on file    Frequency of Social Gatherings with Friends and Family: Not on file    Attends Anabaptism Services: Not on file    Active Member of [address] [place] [address] [employer] or Organizations: Not on file    Attends Club or Organization Meetings: Not on file    Marital Status: Not on file   Intimate Partner Violence:     Fear of Current or Ex-Partner: Not on file    Emotionally Abused: Not on file    Physically Abused: Not on file    Sexually Abused: Not on file   Housing Stability:     Unable to Pay for Housing in the Last Year: Not on file    Number of [place] in the Last Year: Not on file    Unstable Housing in the Last Year: Not on file         ALLERGIES: Penicillins    Review of Systems   Constitutional: Negative for chills, fatigue and fever. HENT: Negative for facial swelling and nosebleeds. Eyes: Negative for pain and visual disturbance. Respiratory: Negative for cough and shortness of breath. Cardiovascular: Negative for chest pain. Gastrointestinal: Negative for abdominal pain, bowel incontinence, nausea and vomiting. Genitourinary: Negative for flank pain. Musculoskeletal: Negative for arthralgias, back pain, extremity weakness, neck pain and neck stiffness. Skin: Positive for wound. Negative for color change and pallor. Neurological: Negative for dizziness, tingling, loss of consciousness, syncope, facial asymmetry, weakness, light-headedness and numbness. Hematological: Does not bruise/bleed easily. Psychiatric/Behavioral: Negative for confusion.        Vitals: 12/03/21 2311 12/04/21 0351 12/04/21 0352   BP: 135/87 122/72    Pulse: 82     Resp: 18     Temp: 98.2 °F (36.8 °C)     SpO2: 99%  97%   Weight: 52.2 kg (115 lb)     Height: 5' 2\" (1.575 m)              Physical Exam  Vitals and nursing note reviewed. Constitutional:       Appearance: Normal appearance. HENT:      Head: Normocephalic. Comments: ~3.5 cm superficial right posterior scalp laceration. No active bleeding. No foreign body noted. No finding suggestive of basilar skull fracture. Nose: Nose normal.      Comments: No septal hematoma. Midface stable. Mouth/Throat:      Mouth: Mucous membranes are moist.   Eyes:      Extraocular Movements: Extraocular movements intact. Conjunctiva/sclera: Conjunctivae normal.      Pupils: Pupils are equal, round, and reactive to light. Neck:      Comments: FROM. No midline C-spine tenderness to palpation. No step-off  Cardiovascular:      Rate and Rhythm: Normal rate. Pulses: Normal pulses. Heart sounds: Normal heart sounds. Pulmonary:      Effort: Pulmonary effort is normal.      Breath sounds: Normal breath sounds. Abdominal:      General: Bowel sounds are normal.      Palpations: Abdomen is soft. Tenderness: There is no abdominal tenderness. There is no guarding. Musculoskeletal:         General: No swelling, tenderness or deformity. Normal range of motion. Cervical back: Normal range of motion. Comments: No midline T-spine or L-spine tenderness to palpation. No step-off. Full range of motion of all extremities. No deformities. Skin:     General: Skin is warm. Findings: Laceration present. No erythema. Neurological:      General: No focal deficit present. Mental Status: She is alert and oriented to person, place, and time. Cranial Nerves: No cranial nerve deficit. Sensory: No sensory deficit. Motor: No weakness. Comments: Strength 5 out of 5 throughout.   Normal sensory exam. No saddle anesthesia. MDM  Number of Diagnoses or Management Options  Closed head injury, initial encounter: new and requires workup  Laceration of scalp, initial encounter: new and requires workup  Lung nodule: new and requires workup  Diagnosis management comments: Tetanus up-to-date. CT head with no acute or concerning findings. CT C-spine with no fracture or dislocation. Patient informed of 2 small ill-defined nodules in the left lung apex. Instructed that she will need follow-up chest CT in 3 months. Patient tolerated staple repair. Instructed for follow-up within 7 days for staple removal.  Patient declines pain control as she is on narcotics already at this time. Given closed head injury precautions. Amount and/or Complexity of Data Reviewed  Tests in the radiology section of CPT®: reviewed and ordered  Tests in the medicine section of CPT®: ordered and reviewed  Review and summarize past medical records: yes  Independent visualization of images, tracings, or specimens: yes    Risk of Complications, Morbidity, and/or Mortality  Presenting problems: moderate  Diagnostic procedures: low  Management options: low    Patient Progress  Patient progress: stable    ED Course as of 12/05/21 0035   Sat Dec 04, 2021   0344 CT Cspine IMPRESSION     1. No acute fracture or dislocation in the cervical spine.     2. Degenerative changes, mostly at C5-C6 and C6-C7.     3. Two small ill-defined nodules in the left lung apex. Follow-up chest CT in 3 months is recommended to evaluate for stability. [DF]   0512 CT head   IMPRESSION     1. No CT evidence of acute intracranial abnormality.  [DF]      ED Course User Index  [DF] Shantel Moss MD       Wound Repair    Date/Time: 12/5/2021 12:36 AM  Performed by: attendingPreparation: skin prepped with Betadine  Location details: scalp  Wound length:2.6 - 7.5 cm  Anesthesia: local infiltration    Anesthesia:  Local Anesthetic: lidocaine 1% without epinephrine  Anesthetic total: 5 mL  Foreign bodies: no foreign bodies  Irrigation solution: saline  Irrigation method: syringe  Skin closure: staples (7 staples )  Approximation: close  Patient tolerance: patient tolerated the procedure well with no immediate complications  My total time at bedside, performing this procedure was 1-15 minutes. Aurora Jones MD; 12/5/2021 @12:36 AM Voice dictation software was used during the making of this note. This software is not perfect and grammatical and other typographical errors may be present.   This note has not been proofread for errors.  ===================================================================

## 2022-02-09 PROBLEM — R73.03 PREDIABETES: Status: ACTIVE | Noted: 2021-08-05

## 2022-02-09 PROBLEM — F51.01 PRIMARY INSOMNIA: Status: ACTIVE | Noted: 2022-02-09

## 2022-02-09 PROBLEM — F33.41 RECURRENT MAJOR DEPRESSIVE DISORDER, IN PARTIAL REMISSION (HCC): Status: RESOLVED | Noted: 2018-12-20 | Resolved: 2022-02-09

## 2022-02-09 PROBLEM — F32.A ANXIETY AND DEPRESSION: Status: ACTIVE | Noted: 2018-12-20

## 2022-03-18 PROBLEM — M48.062 SPINAL STENOSIS OF LUMBAR REGION WITH NEUROGENIC CLAUDICATION: Status: ACTIVE | Noted: 2019-01-31

## 2022-03-18 PROBLEM — G89.4 CHRONIC PAIN SYNDROME: Status: ACTIVE | Noted: 2021-08-31

## 2022-03-18 PROBLEM — M51.26 HNP (HERNIATED NUCLEUS PULPOSUS), LUMBAR: Status: ACTIVE | Noted: 2019-01-31

## 2022-03-19 PROBLEM — E55.9 VITAMIN D DEFICIENCY: Status: ACTIVE | Noted: 2018-12-20

## 2022-03-19 PROBLEM — I10 ESSENTIAL HYPERTENSION: Status: ACTIVE | Noted: 2018-12-20

## 2022-03-19 PROBLEM — F41.9 ANXIETY AND DEPRESSION: Status: ACTIVE | Noted: 2018-12-20

## 2022-03-19 PROBLEM — K21.9 GASTROESOPHAGEAL REFLUX DISEASE WITHOUT ESOPHAGITIS: Status: ACTIVE | Noted: 2018-12-20

## 2022-03-19 PROBLEM — R73.03 PREDIABETES: Status: ACTIVE | Noted: 2021-08-05

## 2022-03-19 PROBLEM — G96.00 CSF LEAK: Status: ACTIVE | Noted: 2019-03-29

## 2022-03-19 PROBLEM — E78.2 MIXED HYPERLIPIDEMIA: Status: ACTIVE | Noted: 2018-12-20

## 2022-03-19 PROBLEM — G96.00 POSTOPERATIVE CSF LEAK: Status: ACTIVE | Noted: 2019-07-15

## 2022-03-19 PROBLEM — Z98.84 HISTORY OF GASTRIC BYPASS: Status: ACTIVE | Noted: 2018-12-20

## 2022-03-19 PROBLEM — M54.10 RADICULOPATHY: Status: ACTIVE | Noted: 2019-03-05

## 2022-03-19 PROBLEM — S22.080A COMPRESSION FRACTURE OF T12 VERTEBRA (HCC): Status: ACTIVE | Noted: 2019-12-10

## 2022-03-19 PROBLEM — G97.82 POSTOPERATIVE CSF LEAK: Status: ACTIVE | Noted: 2019-07-15

## 2022-03-19 PROBLEM — F32.A ANXIETY AND DEPRESSION: Status: ACTIVE | Noted: 2018-12-20

## 2022-03-19 PROBLEM — M81.0 OSTEOPOROSIS: Status: ACTIVE | Noted: 2020-02-18

## 2022-03-20 PROBLEM — M51.369 DDD (DEGENERATIVE DISC DISEASE), LUMBAR: Status: ACTIVE | Noted: 2021-09-21

## 2022-03-20 PROBLEM — F51.01 PRIMARY INSOMNIA: Status: ACTIVE | Noted: 2022-02-09

## 2022-03-20 PROBLEM — M51.36 DDD (DEGENERATIVE DISC DISEASE), LUMBAR: Status: ACTIVE | Noted: 2021-09-21

## 2022-03-20 PROBLEM — G44.89 OTHER HEADACHE SYNDROME: Status: ACTIVE | Noted: 2019-07-09

## 2022-03-20 PROBLEM — G25.81 RESTLESS LEG SYNDROME: Status: ACTIVE | Noted: 2021-01-28

## 2022-07-21 ENCOUNTER — COMMUNITY OUTREACH (OUTPATIENT)
Dept: INTERNAL MEDICINE CLINIC | Facility: CLINIC | Age: 62
End: 2022-07-21

## 2022-08-01 DIAGNOSIS — K21.9 GASTROESOPHAGEAL REFLUX DISEASE WITHOUT ESOPHAGITIS: ICD-10-CM

## 2022-08-01 DIAGNOSIS — R73.03 PREDIABETES: ICD-10-CM

## 2022-08-01 DIAGNOSIS — I10 ESSENTIAL HYPERTENSION: Primary | ICD-10-CM

## 2022-08-10 ENCOUNTER — NURSE ONLY (OUTPATIENT)
Dept: INTERNAL MEDICINE CLINIC | Facility: CLINIC | Age: 62
End: 2022-08-10

## 2022-08-10 ENCOUNTER — OFFICE VISIT (OUTPATIENT)
Dept: INTERNAL MEDICINE CLINIC | Facility: CLINIC | Age: 62
End: 2022-08-10

## 2022-08-10 VITALS
TEMPERATURE: 98.1 F | BODY MASS INDEX: 21.05 KG/M2 | SYSTOLIC BLOOD PRESSURE: 147 MMHG | HEART RATE: 97 BPM | DIASTOLIC BLOOD PRESSURE: 89 MMHG | RESPIRATION RATE: 16 BRPM | OXYGEN SATURATION: 100 % | WEIGHT: 114.4 LBS | HEIGHT: 62 IN

## 2022-08-10 DIAGNOSIS — F32.A ANXIETY AND DEPRESSION: ICD-10-CM

## 2022-08-10 DIAGNOSIS — K21.9 GASTROESOPHAGEAL REFLUX DISEASE WITHOUT ESOPHAGITIS: ICD-10-CM

## 2022-08-10 DIAGNOSIS — K59.09 CHRONIC CONSTIPATION: ICD-10-CM

## 2022-08-10 DIAGNOSIS — M25.542 ARTHRALGIA OF BOTH HANDS: ICD-10-CM

## 2022-08-10 DIAGNOSIS — E55.9 VITAMIN D DEFICIENCY: ICD-10-CM

## 2022-08-10 DIAGNOSIS — R20.2 PARESTHESIA OF BOTH HANDS: ICD-10-CM

## 2022-08-10 DIAGNOSIS — M51.36 DDD (DEGENERATIVE DISC DISEASE), LUMBAR: ICD-10-CM

## 2022-08-10 DIAGNOSIS — F41.9 ANXIETY AND DEPRESSION: ICD-10-CM

## 2022-08-10 DIAGNOSIS — G25.81 RESTLESS LEG SYNDROME: ICD-10-CM

## 2022-08-10 DIAGNOSIS — M25.541 ARTHRALGIA OF BOTH HANDS: ICD-10-CM

## 2022-08-10 DIAGNOSIS — R91.1 LUNG NODULE: ICD-10-CM

## 2022-08-10 DIAGNOSIS — R73.03 PREDIABETES: ICD-10-CM

## 2022-08-10 DIAGNOSIS — I10 ESSENTIAL HYPERTENSION: Primary | ICD-10-CM

## 2022-08-10 DIAGNOSIS — E78.2 MIXED HYPERLIPIDEMIA: ICD-10-CM

## 2022-08-10 DIAGNOSIS — M81.0 AGE-RELATED OSTEOPOROSIS WITHOUT CURRENT PATHOLOGICAL FRACTURE: ICD-10-CM

## 2022-08-10 DIAGNOSIS — F51.01 PRIMARY INSOMNIA: ICD-10-CM

## 2022-08-10 PROCEDURE — 99214 OFFICE O/P EST MOD 30 MIN: CPT | Performed by: INTERNAL MEDICINE

## 2022-08-10 RX ORDER — HYDROCODONE BITARTRATE AND ACETAMINOPHEN 10; 325 MG/1; MG/1
TABLET ORAL
COMMUNITY
Start: 2022-07-19

## 2022-08-10 RX ORDER — ONDANSETRON 4 MG/1
4 TABLET, ORALLY DISINTEGRATING ORAL EVERY 8 HOURS PRN
Qty: 90 TABLET | Refills: 2 | Status: SHIPPED | OUTPATIENT
Start: 2022-08-10

## 2022-08-10 RX ORDER — ZOLPIDEM TARTRATE 5 MG/1
5 TABLET ORAL NIGHTLY PRN
Qty: 30 TABLET | Refills: 2 | Status: SHIPPED | OUTPATIENT
Start: 2022-08-10 | End: 2022-09-09

## 2022-08-10 RX ORDER — ROPINIROLE 1 MG/1
1 TABLET, FILM COATED ORAL DAILY
Qty: 90 TABLET | Refills: 1 | Status: SHIPPED | OUTPATIENT
Start: 2022-08-10

## 2022-08-10 RX ORDER — IBANDRONATE SODIUM 150 MG/1
150 TABLET, FILM COATED ORAL
Qty: 3 TABLET | Refills: 3 | Status: SHIPPED | OUTPATIENT
Start: 2022-08-10

## 2022-08-10 RX ORDER — ERGOCALCIFEROL (VITAMIN D2) 1250 MCG
50000 CAPSULE ORAL
Qty: 12 CAPSULE | Refills: 1 | Status: SHIPPED | OUTPATIENT
Start: 2022-08-10

## 2022-08-10 RX ORDER — SIMVASTATIN 10 MG
10 TABLET ORAL DAILY
Qty: 90 TABLET | Refills: 1 | Status: SHIPPED | OUTPATIENT
Start: 2022-08-10

## 2022-08-10 RX ORDER — OMEPRAZOLE 20 MG/1
20 CAPSULE, DELAYED RELEASE ORAL DAILY
Qty: 90 CAPSULE | Refills: 1 | Status: SHIPPED | OUTPATIENT
Start: 2022-08-10

## 2022-08-10 RX ORDER — MORPHINE SULFATE 15 MG/1
TABLET, FILM COATED, EXTENDED RELEASE ORAL
COMMUNITY
Start: 2022-07-19

## 2022-08-10 RX ORDER — POTASSIUM CHLORIDE 750 MG/1
10 TABLET, EXTENDED RELEASE ORAL DAILY
Qty: 90 TABLET | Refills: 1 | Status: SHIPPED | OUTPATIENT
Start: 2022-08-10

## 2022-08-10 RX ORDER — BUPROPION HYDROCHLORIDE 150 MG/1
150 TABLET ORAL EVERY MORNING
Qty: 30 TABLET | Refills: 5 | Status: SHIPPED | OUTPATIENT
Start: 2022-08-10

## 2022-08-10 RX ORDER — BUSPIRONE HYDROCHLORIDE 10 MG/1
10 TABLET ORAL 3 TIMES DAILY
Qty: 270 TABLET | Refills: 1 | Status: SHIPPED | OUTPATIENT
Start: 2022-08-10

## 2022-08-10 ASSESSMENT — PATIENT HEALTH QUESTIONNAIRE - PHQ9
6. FEELING BAD ABOUT YOURSELF - OR THAT YOU ARE A FAILURE OR HAVE LET YOURSELF OR YOUR FAMILY DOWN: 0
3. TROUBLE FALLING OR STAYING ASLEEP: 3
5. POOR APPETITE OR OVEREATING: 3
9. THOUGHTS THAT YOU WOULD BE BETTER OFF DEAD, OR OF HURTING YOURSELF: 0
SUM OF ALL RESPONSES TO PHQ QUESTIONS 1-9: 18
SUM OF ALL RESPONSES TO PHQ QUESTIONS 1-9: 18
2. FEELING DOWN, DEPRESSED OR HOPELESS: 3
SUM OF ALL RESPONSES TO PHQ9 QUESTIONS 1 & 2: 6
8. MOVING OR SPEAKING SO SLOWLY THAT OTHER PEOPLE COULD HAVE NOTICED. OR THE OPPOSITE, BEING SO FIGETY OR RESTLESS THAT YOU HAVE BEEN MOVING AROUND A LOT MORE THAN USUAL: 0
4. FEELING TIRED OR HAVING LITTLE ENERGY: 3
SUM OF ALL RESPONSES TO PHQ QUESTIONS 1-9: 18
SUM OF ALL RESPONSES TO PHQ QUESTIONS 1-9: 18
10. IF YOU CHECKED OFF ANY PROBLEMS, HOW DIFFICULT HAVE THESE PROBLEMS MADE IT FOR YOU TO DO YOUR WORK, TAKE CARE OF THINGS AT HOME, OR GET ALONG WITH OTHER PEOPLE: 1
1. LITTLE INTEREST OR PLEASURE IN DOING THINGS: 3
7. TROUBLE CONCENTRATING ON THINGS, SUCH AS READING THE NEWSPAPER OR WATCHING TELEVISION: 3

## 2022-08-10 ASSESSMENT — ENCOUNTER SYMPTOMS
SHORTNESS OF BREATH: 0
DIARRHEA: 0
CHEST TIGHTNESS: 0
NAUSEA: 1
ABDOMINAL PAIN: 0
BLOOD IN STOOL: 0
RHINORRHEA: 0
VOMITING: 0
COUGH: 0
SINUS PRESSURE: 0

## 2022-08-10 NOTE — PROGRESS NOTES
[facility]      8/10/2022    Patient Name: Mariah Yeager  :  1960    Subjective:    Chief Complaint:  Chief Complaint   Patient presents with    Hypertension     Pt is here to review labs. HPI Here for f/u visit; patient had fasting labs done recently and results were reviewed in detail today; She has history of emergent diagnostic laparoscopy, exploratory laparotomy with reduction of jejunojejunal intussusception and bowel pexy 22, per Dr. Parson Cristopher; she was seen for f/u 22; she healed well, having regular bowel movements; advised to continue Miralax regimen for chronic opioid induced constipation; records reviewed; She has Cologuard kit at home and plans to submit; She has anxiety and depression; she takes Cymbalta 60 mg bid and Prozac 40 mg qd, Buspar 10 mg tid; she has been out of all of her medications for over a month; she feels like the Prozac has not been working as well as it used to; She has chronic back pain and goes to Pain Management; she has noticed worsening numbness in her hands, dropping her coffee cup in the morning; she has aching, stiffness in her hands, worse in the morning;   HTN:  Patient compliant with taking blood pressure medications: Yes  Discussed importance of following low sodium DASH diet, increasing physical activity, taking medications as ordered, decreasing alcohol intake, keeping f/u visits to recheck blood pressure, monitoring blood pressure at home and keeping a log, with goal blood pressure <140/90.   Home bp readings are: does not monitor      Past Medical History:   Diagnosis Date    Anxiety     Back problem     Depression     Ex-smoker for less than 1 year     Quit 2018  1 ppd x 30 yrs    Family history of anesthesia complication     Patient's father developed an arrhythmia in PACU s/p hip arthroplasty    GERD (gastroesophageal reflux disease)     well controlled with meds    History of bone density study 2020    osteoporosis History of mammogram 2020    History of Papanicolaou smear of cervix >5 years aog    Hyperlipidemia     Hypertension     hctz    Nicotine vapor product user     0.9%    Sleep apnea     no CPAP       Past Surgical History:   Procedure Laterality Date    BREAST BIOPSY Left     BREAST SURGERY      CHOLECYSTECTOMY, LAPAROSCOPIC          GASTRIC BYPASS SURGERY      Dr. 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    LUMBAR LAMINECTOMY  03/15/2019    right L4-5 Lami    NEUROLOGICAL SURGERY  2019    repair of CSF leak right L4-5    LATIA AND BSO (CERVIX REMOVED)      TOTAL ABDOM HYSTERECTOMY         Family History   Problem Relation Age of Onset    Stroke Paternal Grandfather     Stroke Paternal Grandmother     Stroke Maternal Grandfather     Breast Cancer Maternal Grandmother 76    Breast Cancer Mother 79    Hypertension Father     Stroke Father     Hypertension Mother     Stroke Maternal Grandmother        Social History     Tobacco Use    Smoking status: Former     Packs/day: 1.00     Types: Cigarettes     Quit date: 2018     Years since quittin.6    Smokeless tobacco: Never   Substance Use Topics    Alcohol use: Not Currently    Drug use: Yes     Types: Marijuana Rema [place])                 Current Outpatient Medications:     Multiple Vitamins-Minerals (MULTIVITAMIN WOMEN 50+ PO), Take 1 tablet by mouth daily, Disp: , Rfl:     morphine (MS CONTIN) 15 MG extended release tablet, TAKE 1 TABLET BY MOUTH EVERY 12 HOURS FOR 30 DAYS, Disp: , Rfl:     HYDROcodone-acetaminophen (NORCO)  MG per tablet, TAKE 1 TABLET BY MOUTH EVERY 8 HOURS FOR 30 DAYS, Disp: , Rfl:     buPROPion (WELLBUTRIN XL) 150 MG extended release tablet, Take 1 tablet by mouth every morning, Disp: 30 tablet, Rfl: 5    zolpidem (AMBIEN) 5 MG tablet, Take 1 tablet by mouth nightly as needed for Sleep for up to 30 days. , Disp: 30 tablet, Rfl: 2 simvastatin (ZOCOR) 10 MG tablet, Take 1 tablet by mouth in the morning., Disp: 90 tablet, Rfl: 1    rOPINIRole (REQUIP) 1 MG tablet, Take 1 tablet by mouth in the morning., Disp: 90 tablet, Rfl: 1    potassium chloride (KLOR-CON M) 10 MEQ extended release tablet, Take 1 tablet by mouth in the morning., Disp: 90 tablet, Rfl: 1    ondansetron (ZOFRAN-ODT) 4 MG disintegrating tablet, Take 1 tablet by mouth every 8 hours as needed for Nausea, Disp: 90 tablet, Rfl: 2    omeprazole (PRILOSEC) 20 MG delayed release capsule, Take 1 capsule by mouth in the morning., Disp: 90 capsule, Rfl: 1    ibandronate (BONIVA) 150 MG tablet, Take 1 tablet by mouth every 30 days, Disp: 3 tablet, Rfl: 3    ergocalciferol (ERGOCALCIFEROL) 1.25 MG (01297 UT) capsule, Take 1 capsule by mouth every 7 days, Disp: 12 capsule, Rfl: 1    busPIRone (BUSPAR) 10 MG tablet, Take 1 tablet by mouth in the morning and 1 tablet at noon and 1 tablet before bedtime. , Disp: 270 tablet, Rfl: 1    butalbital-acetaminophen-caffeine (FIORICET, ESGIC) -40 MG per tablet, Take 1 tablet by mouth every 6 hours as needed, Disp: , Rfl:     DULoxetine (CYMBALTA) 60 MG extended release capsule, Take 60 mg by mouth 2 times daily, Disp: , Rfl:     gabapentin (NEURONTIN) 300 MG capsule, TAKE 2 CAPSULES BY MOUTH 3 TIMES A DAY, Disp: , Rfl:     hydroCHLOROthiazide (MICROZIDE) 12.5 MG capsule, Take 12.5 mg by mouth daily, Disp: , Rfl:     ibuprofen (ADVIL;MOTRIN) 600 MG tablet, TAKE 1 TABLET BY MOUTH EVERY 6 HOURS AS NEEDED FOR PAIN, Disp: , Rfl:     morphine (AVINZA) 30 MG extended release capsule, Take 1 capsule by oral route every day, Disp: , Rfl:     Allergies   Allergen Reactions    Penicillins Hives and Swelling       Review of Systems   Constitutional:  Negative for chills, fatigue and fever. HENT:  Negative for congestion, postnasal drip, rhinorrhea and sinus pressure. Eyes:  Negative for visual disturbance.    Respiratory:  Negative for cough, chest tightness and shortness of breath. Cardiovascular:  Negative for chest pain and palpitations. Gastrointestinal:  Positive for nausea. Negative for abdominal pain, blood in stool, diarrhea and vomiting. Genitourinary:  Negative for dysuria, frequency and urgency. Musculoskeletal:  Positive for arthralgias and myalgias. Skin: Negative. Neurological:  Positive for numbness. Negative for headaches. Psychiatric/Behavioral:  Negative for dysphoric mood and sleep disturbance. The patient is not nervous/anxious. Objective:  BP (!) 147/89   Pulse 97   Temp 98.1 °F (36.7 °C) (Temporal)   Resp 16   Ht 5' 2\" (1.575 m)   Wt 114 lb 6.4 oz (51.9 kg)   SpO2 100%   BMI 20.92 kg/m²     Examination:  Physical Exam  Constitutional:       Appearance: Normal appearance. HENT:      Head: Normocephalic and atraumatic. Right Ear: Tympanic membrane and ear canal normal.      Left Ear: Tympanic membrane and ear canal normal.      Nose: Nose normal.      Mouth/Throat:      Mouth: Mucous membranes are moist.   Eyes:      Extraocular Movements: Extraocular movements intact. Conjunctiva/sclera: Conjunctivae normal.      Pupils: Pupils are equal, round, and reactive to light. Cardiovascular:      Rate and Rhythm: Normal rate and regular rhythm. Pulses: Normal pulses. Heart sounds: Normal heart sounds. Pulmonary:      Effort: Pulmonary effort is normal.      Breath sounds: Normal breath sounds. Abdominal:      General: Abdomen is flat. Bowel sounds are normal.      Palpations: Abdomen is soft. Musculoskeletal:         General: Tenderness present. Cervical back: Normal range of motion and neck supple. Skin:     General: Skin is warm and dry. Neurological:      General: No focal deficit present. Mental Status: She is alert. Mental status is at baseline. Psychiatric:         Mood and Affect: Mood normal.         Thought Content:  Thought content normal. Assessment/Plan:    Derick Singleton was seen today for hypertension. Diagnoses and all orders for this visit:    Essential hypertension  Stable, continue medication, diet. She has been out of medications for past month;      -     potassium chloride (KLOR-CON M) 10 MEQ extended release tablet; Take 1 tablet by mouth in the morning. Chronic constipation  Stable. Age-related osteoporosis without current pathological fracture  -     DEXA BONE DENSITY AXIAL SKELETON; Future  -     ibandronate (BONIVA) 150 MG tablet; Take 1 tablet by mouth every 30 days    DDD (degenerative disc disease), lumbar  She has regular f/u w/ Pain Management. Anxiety and depression  Instructed to take medications as prescribed, and to call if no improvement in symptoms. She is to f/u in 1 month for recheck;     -     buPROPion (WELLBUTRIN XL) 150 MG extended release tablet; Take 1 tablet by mouth every morning  -     busPIRone (BUSPAR) 10 MG tablet; Take 1 tablet by mouth in the morning and 1 tablet at noon and 1 tablet before bedtime. Mixed hyperlipidemia  Stable, continue medication, diet. -     simvastatin (ZOCOR) 10 MG tablet; Take 1 tablet by mouth in the morning. Vitamin D deficiency  -     ergocalciferol (ERGOCALCIFEROL) 1.25 MG (07562 UT) capsule; Take 1 capsule by mouth every 7 days    Prediabetes  Stable. Primary insomnia  Stable on present medications, continue as prescribed    -     zolpidem (AMBIEN) 5 MG tablet; Take 1 tablet by mouth nightly as needed for Sleep for up to 30 days. Paresthesia of both hands  -     [facility] - Karle Come, (NCS/Nerve Conduction)    Arthralgia of both hands  -     LALO, Direct, w/Reflex; Future  -     Rheumatoid Factor; Future  -     CCP Antibodies, IGG/IGA; Future  -     C-Reactive Protein; Future  -     Sedimentation Rate; Future    Restless leg syndrome  Stable on present medications, continue as prescribed. -     rOPINIRole (REQUIP) 1 MG tablet;  Take 1 tablet by mouth in the morning. Gastroesophageal reflux disease without esophagitis  Stable on present medications, continue as prescribed    -     ondansetron (ZOFRAN-ODT) 4 MG disintegrating tablet; Take 1 tablet by mouth every 8 hours as needed for Nausea  -     omeprazole (PRILOSEC) 20 MG delayed release capsule; Take 1 capsule by mouth in the morning. Lung nodule  -     CT CHEST WO CONTRAST; Future        Follow-up and Dispositions    Return in about 4 weeks (around 9/7/2022), or if symptoms worsen or fail to improve, for f/u. Medication Reconciliation:  Current Medications Verified: Current medications/ immunizations were reviewed, including purpose, with patient. Family History, Social History, Current and Past Medical History was reviewed with patient and updated at today's office visit. Medication Reconciliation list was given to patient/ family. Patient was advised to discard old medication lists and provide all providers with current list at each visit and carry list with them in case of emergency.     Completed By:   Mica Morton MD    [facility]  [address] [place] [address], [address], [address]  [phone]  [phone] fax  8:56 AM

## 2022-08-11 LAB
CRP SERPL-MCNC: <0.3 MG/DL (ref 0–0.9)
ERYTHROCYTE [SEDIMENTATION RATE] IN BLOOD: 3 MM/HR (ref 0–30)
RHEUMATOID FACT SER QL LA: NEGATIVE

## 2022-08-12 LAB
ANA SER QL: POSITIVE
CENTROMERE B AB SER-ACNC: <0.2 AI (ref 0–0.9)
CHROMATIN AB SERPL-ACNC: <0.2 AI (ref 0–0.9)
DSDNA AB SER-ACNC: <1 IU/ML (ref 0–9)
ENA JO1 AB SER-ACNC: <0.2 AI (ref 0–0.9)
ENA RNP AB SER-ACNC: <0.2 AI (ref 0–0.9)
ENA SCL70 AB SER-ACNC: 3.1 AI (ref 0–0.9)
ENA SM AB SER-ACNC: <0.2 AI (ref 0–0.9)
ENA SS-A AB SER-ACNC: <0.2 AI (ref 0–0.9)
ENA SS-B AB SER-ACNC: <0.2 AI (ref 0–0.9)
Lab: ABNORMAL

## 2022-08-13 LAB — CCP IGA+IGG SERPL IA-ACNC: 4 UNITS (ref 0–19)

## 2022-08-24 ENCOUNTER — HOSPITAL ENCOUNTER (OUTPATIENT)
Dept: CT IMAGING | Age: 62
Discharge: HOME | End: 2022-08-27
Payer: SELF-PAY

## 2022-08-24 ENCOUNTER — HOSPITAL ENCOUNTER (OUTPATIENT)
Dept: MAMMOGRAPHY | Age: 62
Discharge: HOME | End: 2022-08-27
Payer: SELF-PAY

## 2022-08-24 DIAGNOSIS — M81.0 AGE-RELATED OSTEOPOROSIS WITHOUT CURRENT PATHOLOGICAL FRACTURE: ICD-10-CM

## 2022-08-24 DIAGNOSIS — R91.1 LUNG NODULE: ICD-10-CM

## 2022-08-24 PROCEDURE — 77080 DXA BONE DENSITY AXIAL: CPT

## 2022-08-24 PROCEDURE — 71250 CT THORAX DX C-: CPT

## 2022-09-01 ENCOUNTER — OFFICE VISIT (OUTPATIENT)
Dept: PHYSICAL MEDICINE AND REHAB | Age: 62
End: 2022-09-01
Payer: COMMERCIAL

## 2022-09-01 VITALS
SYSTOLIC BLOOD PRESSURE: 149 MMHG | WEIGHT: 114 LBS | HEART RATE: 83 BPM | DIASTOLIC BLOOD PRESSURE: 90 MMHG | BODY MASS INDEX: 20.85 KG/M2

## 2022-09-01 DIAGNOSIS — M54.12 CERVICAL RADICULOPATHY AT C5: Primary | ICD-10-CM

## 2022-09-01 PROCEDURE — 95886 MUSC TEST DONE W/N TEST COMP: CPT | Performed by: PHYSICAL MEDICINE & REHABILITATION

## 2022-09-01 PROCEDURE — 95912 NRV CNDJ TEST 11-12 STUDIES: CPT | Performed by: PHYSICAL MEDICINE & REHABILITATION

## 2022-09-20 ENCOUNTER — TELEMEDICINE (OUTPATIENT)
Dept: INTERNAL MEDICINE CLINIC | Facility: CLINIC | Age: 62
End: 2022-09-20
Payer: SELF-PAY

## 2022-09-20 DIAGNOSIS — M79.641 BILATERAL HAND PAIN: ICD-10-CM

## 2022-09-20 DIAGNOSIS — F32.A ANXIETY AND DEPRESSION: ICD-10-CM

## 2022-09-20 DIAGNOSIS — R76.8 POSITIVE ANA (ANTINUCLEAR ANTIBODY): Primary | ICD-10-CM

## 2022-09-20 DIAGNOSIS — F41.9 ANXIETY AND DEPRESSION: ICD-10-CM

## 2022-09-20 DIAGNOSIS — M79.642 BILATERAL HAND PAIN: ICD-10-CM

## 2022-09-20 PROCEDURE — 1036F TOBACCO NON-USER: CPT | Performed by: INTERNAL MEDICINE

## 2022-09-20 PROCEDURE — G8420 CALC BMI NORM PARAMETERS: HCPCS | Performed by: INTERNAL MEDICINE

## 2022-09-20 PROCEDURE — 3017F COLORECTAL CA SCREEN DOC REV: CPT | Performed by: INTERNAL MEDICINE

## 2022-09-20 PROCEDURE — G8427 DOCREV CUR MEDS BY ELIG CLIN: HCPCS | Performed by: INTERNAL MEDICINE

## 2022-09-20 PROCEDURE — 99213 OFFICE O/P EST LOW 20 MIN: CPT | Performed by: INTERNAL MEDICINE

## 2022-09-20 ASSESSMENT — ENCOUNTER SYMPTOMS: RESPIRATORY NEGATIVE: 1

## 2022-09-20 NOTE — PROGRESS NOTES
mouth in the morning., Disp: 90 tablet, Rfl: 1    rOPINIRole (REQUIP) 1 MG tablet, Take 1 tablet by mouth in the morning., Disp: 90 tablet, Rfl: 1    potassium chloride (KLOR-CON M) 10 MEQ extended release tablet, Take 1 tablet by mouth in the morning., Disp: 90 tablet, Rfl: 1    ondansetron (ZOFRAN-ODT) 4 MG disintegrating tablet, Take 1 tablet by mouth every 8 hours as needed for Nausea, Disp: 90 tablet, Rfl: 2    omeprazole (PRILOSEC) 20 MG delayed release capsule, Take 1 capsule by mouth in the morning., Disp: 90 capsule, Rfl: 1    ibandronate (BONIVA) 150 MG tablet, Take 1 tablet by mouth every 30 days, Disp: 3 tablet, Rfl: 3    ergocalciferol (ERGOCALCIFEROL) 1.25 MG (98371 UT) capsule, Take 1 capsule by mouth every 7 days, Disp: 12 capsule, Rfl: 1    busPIRone (BUSPAR) 10 MG tablet, Take 1 tablet by mouth in the morning and 1 tablet at noon and 1 tablet before bedtime. , Disp: 270 tablet, Rfl: 1    butalbital-acetaminophen-caffeine (FIORICET, ESGIC) -40 MG per tablet, Take 1 tablet by mouth every 6 hours as needed, Disp: , Rfl:     DULoxetine (CYMBALTA) 60 MG extended release capsule, Take 60 mg by mouth 2 times daily, Disp: , Rfl:     gabapentin (NEURONTIN) 300 MG capsule, TAKE 2 CAPSULES BY MOUTH 3 TIMES A DAY, Disp: , Rfl:     hydroCHLOROthiazide (MICROZIDE) 12.5 MG capsule, Take 12.5 mg by mouth daily, Disp: , Rfl:     ibuprofen (ADVIL;MOTRIN) 600 MG tablet, TAKE 1 TABLET BY MOUTH EVERY 6 HOURS AS NEEDED FOR PAIN, Disp: , Rfl:     morphine (AVINZA) 30 MG extended release capsule, Take 1 capsule by oral route every day, Disp: , Rfl:     Allergies   Allergen Reactions    Penicillins Hives and Swelling       Review of Systems   Constitutional: Negative. HENT: Negative. Respiratory: Negative. Musculoskeletal:  Positive for arthralgias. Neurological:  Positive for numbness. Objective: There were no vitals taken for this visit.     Examination:  Physical Exam  Neurological:      Mental Status: She is alert. Psychiatric:         Mood and Affect: Mood normal.         Thought Content: Thought content normal.         Judgment: Judgment normal.         Assessment/Plan:    Lauren Lujan was seen today for follow-up. Diagnoses and all orders for this visit:    Positive LALO (antinuclear antibody)  -     [address] [facility]    Bilateral hand pain  -     [address] Rheumat[facility]    Anxiety and depression  Stable on present medications, continue as prescribed. Follow-up and Dispositions    Return in about 6 months (around 3/20/2023), or if symptoms worsen or fail to improve, for f/u w/ labs. Medication Reconciliation:  Current Medications Verified: Current medications/ immunizations were reviewed, including purpose, with patient. Family History, Social History, Current and Past Medical History was reviewed with patient and updated at today's office visit. Medication Reconciliation list was given to patient/ family. Patient was advised to discard old medication lists and provide all providers with current list at each visit and carry list with them in case of emergency.     Completed By:   Tyson Bell MD    [facility]  [address] [place] [address], [address], [address]  [phone]  [phone] fax  4:46 PM

## 2022-10-19 ENCOUNTER — TELEPHONE (OUTPATIENT)
Dept: INTERNAL MEDICINE CLINIC | Facility: CLINIC | Age: 62
End: 2022-10-19

## 2022-10-19 NOTE — TELEPHONE ENCOUNTER
Pt called on nurse triage for swelling in both legs, more predominantly in the left leg than the right. She is scheduled to see PCP on Monday. She is doing propping of the legs when she can.     Thank you    Tomasa Wells

## 2022-10-20 NOTE — TELEPHONE ENCOUNTER
Talked to pt and she says the swelling has gone down since she's been propping her feet up. She says she works a job where she's on her feet all day. I informed her, per Dr. Yessenia Sylvester, she is to go to Urgent Care if symptoms get worse. I also informed pt to make sure she keeps her appt Monday w/ Dr. Yessenia Sylvester.

## 2022-10-24 ENCOUNTER — OFFICE VISIT (OUTPATIENT)
Dept: INTERNAL MEDICINE CLINIC | Facility: CLINIC | Age: 62
End: 2022-10-24
Payer: COMMERCIAL

## 2022-10-24 VITALS
RESPIRATION RATE: 16 BRPM | OXYGEN SATURATION: 100 % | HEART RATE: 74 BPM | BODY MASS INDEX: 22.19 KG/M2 | HEIGHT: 62 IN | DIASTOLIC BLOOD PRESSURE: 84 MMHG | WEIGHT: 120.6 LBS | TEMPERATURE: 98.4 F | SYSTOLIC BLOOD PRESSURE: 152 MMHG

## 2022-10-24 DIAGNOSIS — I10 ESSENTIAL HYPERTENSION: ICD-10-CM

## 2022-10-24 DIAGNOSIS — F51.01 PRIMARY INSOMNIA: ICD-10-CM

## 2022-10-24 DIAGNOSIS — R60.0 LEG EDEMA: Primary | ICD-10-CM

## 2022-10-24 DIAGNOSIS — Z23 NEED FOR INFLUENZA VACCINATION: ICD-10-CM

## 2022-10-24 PROCEDURE — 1036F TOBACCO NON-USER: CPT | Performed by: INTERNAL MEDICINE

## 2022-10-24 PROCEDURE — G8420 CALC BMI NORM PARAMETERS: HCPCS | Performed by: INTERNAL MEDICINE

## 2022-10-24 PROCEDURE — 3017F COLORECTAL CA SCREEN DOC REV: CPT | Performed by: INTERNAL MEDICINE

## 2022-10-24 PROCEDURE — 99213 OFFICE O/P EST LOW 20 MIN: CPT | Performed by: INTERNAL MEDICINE

## 2022-10-24 PROCEDURE — G8427 DOCREV CUR MEDS BY ELIG CLIN: HCPCS | Performed by: INTERNAL MEDICINE

## 2022-10-24 PROCEDURE — 90471 IMMUNIZATION ADMIN: CPT | Performed by: INTERNAL MEDICINE

## 2022-10-24 PROCEDURE — G8482 FLU IMMUNIZE ORDER/ADMIN: HCPCS | Performed by: INTERNAL MEDICINE

## 2022-10-24 PROCEDURE — 90674 CCIIV4 VAC NO PRSV 0.5 ML IM: CPT | Performed by: INTERNAL MEDICINE

## 2022-10-24 RX ORDER — ZOLPIDEM TARTRATE 5 MG/1
TABLET ORAL
COMMUNITY
Start: 2022-10-07 | End: 2022-10-24 | Stop reason: SDUPTHER

## 2022-10-24 RX ORDER — HYDROCHLOROTHIAZIDE 25 MG/1
25 TABLET ORAL EVERY MORNING
Qty: 90 TABLET | Refills: 1 | Status: SHIPPED | OUTPATIENT
Start: 2022-10-24

## 2022-10-24 RX ORDER — ONDANSETRON 4 MG/1
TABLET, FILM COATED ORAL
COMMUNITY
Start: 2022-10-11

## 2022-10-24 RX ORDER — ZOLPIDEM TARTRATE 10 MG/1
10 TABLET ORAL NIGHTLY PRN
Qty: 30 TABLET | Refills: 2 | Status: SHIPPED | OUTPATIENT
Start: 2022-10-24 | End: 2022-11-23

## 2022-10-24 RX ORDER — MELOXICAM 7.5 MG/1
TABLET ORAL
COMMUNITY
Start: 2022-10-08

## 2022-10-24 RX ORDER — DICLOFENAC POTASSIUM 50 MG/1
TABLET, FILM COATED ORAL
COMMUNITY
Start: 2022-10-11

## 2022-10-24 ASSESSMENT — PATIENT HEALTH QUESTIONNAIRE - PHQ9
SUM OF ALL RESPONSES TO PHQ QUESTIONS 1-9: 0
1. LITTLE INTEREST OR PLEASURE IN DOING THINGS: 0
SUM OF ALL RESPONSES TO PHQ9 QUESTIONS 1 & 2: 0
SUM OF ALL RESPONSES TO PHQ QUESTIONS 1-9: 0
2. FEELING DOWN, DEPRESSED OR HOPELESS: 0

## 2022-10-24 ASSESSMENT — ENCOUNTER SYMPTOMS
VOMITING: 0
CHEST TIGHTNESS: 0
SHORTNESS OF BREATH: 0
NAUSEA: 0
BLOOD IN STOOL: 0
COUGH: 0
SINUS PRESSURE: 0
DIARRHEA: 0
ABDOMINAL PAIN: 0
RHINORRHEA: 0

## 2022-10-24 NOTE — PROGRESS NOTES
[facility]      10/24/2022    Patient Name: Yaron Nuñez  :  1960    Subjective:    Chief Complaint:  Chief Complaint   Patient presents with    Leg Swelling     Pt c/o bilateral leg swelling x6 weeks, but predominately in the left. Foot Swelling     Pt c/o feet swelling x6 weeks, but predominantly left foot. HPI c/o bilateral leg swelling for the past 6-7 weeks; she is taking HCTZ 12.5 mg when she is not at work; She has insomnia, sleeps for about 5 hours and then gets up and has difficulty going back to bed; she takes Ambien 5 mg qhs, but does not find it as helpful;   HTN:  Patient compliant with taking blood pressure medications: Yes  Discussed importance of following low sodium DASH diet, increasing physical activity, taking medications as ordered, decreasing alcohol intake, keeping f/u visits to recheck blood pressure, monitoring blood pressure at home and keeping a log, with goal blood pressure <140/90.   Home bp readings are: good, home readings better      Past Medical History:   Diagnosis Date    Anxiety     Back problem     Depression     Ex-smoker for less than 1 year     Quit 2018  1 ppd x 30 yrs    Family history of anesthesia complication     Patient's father developed an arrhythmia in PACU s/p hip arthroplasty    GERD (gastroesophageal reflux disease)     well controlled with meds    History of bone density study 2020    osteoporosis    History of mammogram 2020    History of Papanicolaou smear of cervix >5 years aog    Hyperlipidemia     Hypertension     hctz    Nicotine vapor product user     0.9%    Sleep apnea     no CPAP       Past Surgical History:   Procedure Laterality Date    BREAST BIOPSY Left     BREAST SURGERY      CHOLECYSTECTOMY, LAPAROSCOPIC          GASTRIC BYPASS SURGERY      Dr. 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    LUMBAR LAMINECTOMY rOPINIRole (REQUIP) 1 MG tablet, Take 1 tablet by mouth in the morning., Disp: 90 tablet, Rfl: 1    potassium chloride (KLOR-CON M) 10 MEQ extended release tablet, Take 1 tablet by mouth in the morning., Disp: 90 tablet, Rfl: 1    ondansetron (ZOFRAN-ODT) 4 MG disintegrating tablet, Take 1 tablet by mouth every 8 hours as needed for Nausea, Disp: 90 tablet, Rfl: 2    omeprazole (PRILOSEC) 20 MG delayed release capsule, Take 1 capsule by mouth in the morning., Disp: 90 capsule, Rfl: 1    ibandronate (BONIVA) 150 MG tablet, Take 1 tablet by mouth every 30 days, Disp: 3 tablet, Rfl: 3    ergocalciferol (ERGOCALCIFEROL) 1.25 MG (79395 UT) capsule, Take 1 capsule by mouth every 7 days, Disp: 12 capsule, Rfl: 1    busPIRone (BUSPAR) 10 MG tablet, Take 1 tablet by mouth in the morning and 1 tablet at noon and 1 tablet before bedtime. , Disp: 270 tablet, Rfl: 1    butalbital-acetaminophen-caffeine (FIORICET, ESGIC) -40 MG per tablet, Take 1 tablet by mouth every 6 hours as needed, Disp: , Rfl:     DULoxetine (CYMBALTA) 60 MG extended release capsule, Take 60 mg by mouth 2 times daily, Disp: , Rfl:     gabapentin (NEURONTIN) 300 MG capsule, TAKE 2 CAPSULES BY MOUTH 3 TIMES A DAY, Disp: , Rfl:     ibuprofen (ADVIL;MOTRIN) 600 MG tablet, TAKE 1 TABLET BY MOUTH EVERY 6 HOURS AS NEEDED FOR PAIN, Disp: , Rfl:     morphine (AVINZA) 30 MG extended release capsule, Take 1 capsule by oral route every day, Disp: , Rfl:     Allergies   Allergen Reactions    Penicillins Hives and Swelling       Review of Systems   Constitutional:  Negative for chills, fatigue and fever. HENT:  Negative for congestion, postnasal drip, rhinorrhea and sinus pressure. Eyes:  Negative for visual disturbance. Respiratory:  Negative for cough, chest tightness and shortness of breath. Cardiovascular:  Positive for leg swelling. Negative for chest pain and palpitations.    Gastrointestinal:  Negative for abdominal pain, blood in stool, diarrhea, prescribed, and to call if no improvement in symptoms. -     hydroCHLOROthiazide (HYDRODIURIL) 25 MG tablet; Take 1 tablet by mouth every morning    Need for influenza vaccination  -     Influenza, FLUCELVAX, (age 10 mo+), IM, Preservative Free, 0.5 mL    Essential hypertension  Recommended low sodium diet; Goal: take meds as prescribed, monitor blood pressure at home and call with readings. -     hydroCHLOROthiazide (HYDRODIURIL) 25 MG tablet; Take 1 tablet by mouth every morning    Primary insomnia  Instructed to take medications as prescribed, and to call if no improvement in symptoms. -     zolpidem (AMBIEN) 10 MG tablet; Take 1 tablet by mouth nightly as needed for Sleep for up to 30 days. Follow-up and Dispositions    Return in about 4 weeks (around 11/21/2022), or if symptoms worsen or fail to improve, for f/u. Medication Reconciliation:  Current Medications Verified: Current medications/ immunizations were reviewed, including purpose, with patient. Family History, Social History, Current and Past Medical History was reviewed with patient and updated at today's office visit. Medication Reconciliation list was given to patient/ family. Patient was advised to discard old medication lists and provide all providers with current list at each visit and carry list with them in case of emergency.     Completed By:   Lisa Aguirre MD    [facility]  [address] [place] [address], [address], [address]  [phone]  [phone] fax  9:06 AM

## 2022-10-24 NOTE — PATIENT INSTRUCTIONS
Recommended low sodium diet; Goal: take meds as prescribed, monitor blood pressure at home and call with readings.

## 2022-11-08 ENCOUNTER — OFFICE VISIT (OUTPATIENT)
Dept: INTERNAL MEDICINE CLINIC | Facility: CLINIC | Age: 62
End: 2022-11-08
Payer: MEDICARE

## 2022-11-08 VITALS
RESPIRATION RATE: 16 BRPM | DIASTOLIC BLOOD PRESSURE: 75 MMHG | OXYGEN SATURATION: 100 % | TEMPERATURE: 97.9 F | HEIGHT: 62 IN | WEIGHT: 108.8 LBS | SYSTOLIC BLOOD PRESSURE: 117 MMHG | HEART RATE: 98 BPM | BODY MASS INDEX: 20.02 KG/M2

## 2022-11-08 DIAGNOSIS — I10 ESSENTIAL HYPERTENSION: Primary | ICD-10-CM

## 2022-11-08 PROCEDURE — 3078F DIAST BP <80 MM HG: CPT | Performed by: INTERNAL MEDICINE

## 2022-11-08 PROCEDURE — 3074F SYST BP LT 130 MM HG: CPT | Performed by: INTERNAL MEDICINE

## 2022-11-08 PROCEDURE — 99213 OFFICE O/P EST LOW 20 MIN: CPT | Performed by: INTERNAL MEDICINE

## 2022-11-08 ASSESSMENT — ENCOUNTER SYMPTOMS
DIARRHEA: 0
COUGH: 0
CHEST TIGHTNESS: 0
RHINORRHEA: 0
NAUSEA: 0
BLOOD IN STOOL: 0
ABDOMINAL PAIN: 0
SINUS PRESSURE: 0
VOMITING: 0
SHORTNESS OF BREATH: 0

## 2022-11-08 ASSESSMENT — PATIENT HEALTH QUESTIONNAIRE - PHQ9
2. FEELING DOWN, DEPRESSED OR HOPELESS: 0
SUM OF ALL RESPONSES TO PHQ QUESTIONS 1-9: 0
1. LITTLE INTEREST OR PLEASURE IN DOING THINGS: 0
SUM OF ALL RESPONSES TO PHQ QUESTIONS 1-9: 0
SUM OF ALL RESPONSES TO PHQ9 QUESTIONS 1 & 2: 0

## 2022-11-08 NOTE — PROGRESS NOTES
[facility]      [facility]    Patient Name: Fransisca Mckee  :  1960    Subjective:    Chief Complaint:  Chief Complaint   Patient presents with    Follow-up     Pt is here for follow for blood pressure check. HPI Here for f/u on HTN; HCTZ increased to 25 mg qd at last month's visit; She has chronic neck and back pain, has regular f/u w/ Pain Management; she takes Morphine at bedtime, changed from [address] to Percocet during the day; she had cervical LEONEL 3 weeks ago, with relief for about 1 week; she has recurrence of headaches;   HTN:  Patient compliant with taking blood pressure medications: Yes  Discussed importance of following low sodium DASH diet, increasing physical activity, taking medications as ordered, decreasing alcohol intake, keeping f/u visits to recheck blood pressure, monitoring blood pressure at home and keeping a log, with goal blood pressure <140/90.   Home bp readings are: 138/85, 134/82, 128/84      Past Medical History:   Diagnosis Date    Anxiety     Back problem     Depression     Ex-smoker for less than 1 year     Quit 2018  1 ppd x 30 yrs    Family history of anesthesia complication     Patient's father developed an arrhythmia in PACU s/p hip arthroplasty    GERD (gastroesophageal reflux disease)     well controlled with meds    History of bone density study 2020    osteoporosis    History of mammogram 2020    History of Papanicolaou smear of cervix >5 years aog    Hyperlipidemia     Hypertension     hctz    Nicotine vapor product user     0.9%    Sleep apnea     no CPAP       Past Surgical History:   Procedure Laterality Date    BREAST BIOPSY Left     BREAST SURGERY      CHOLECYSTECTOMY, LAPAROSCOPIC          GASTRIC BYPASS SURGERY      Dr. 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    LUMBAR LAMINECTOMY  03/15/2019    right L4-5 Lami    NEUROLOGICAL SURGERY 2019    repair of CSF leak right L4-5    LATIA AND BSO (CERVIX REMOVED)      TOTAL ABDOM HYSTERECTOMY         Family History   Problem Relation Age of Onset    Stroke Paternal Grandfather     Stroke Paternal Grandmother     Stroke Maternal Grandfather     Breast Cancer Maternal Grandmother 76    Breast Cancer Mother 79    Hypertension Father     Stroke Father     Hypertension Mother     Stroke Maternal Grandmother        Social History     Tobacco Use    Smoking status: Former     Packs/day: 1.00     Types: Cigarettes     Quit date: 2018     Years since quittin.8    Smokeless tobacco: Never   Substance Use Topics    Alcohol use: Not Currently    Drug use: Yes     Types: Marijuana Nadine Ege)                 Current Outpatient Medications:     diclofenac (CATAFLAM) 50 MG tablet, TAKE 1 TABLET BY MOUTH TWICE A DAY WITH FOOD OR MILK AS NEEDED FOR 30 DAYS, Disp: , Rfl:     meloxicam (MOBIC) 7.5 MG tablet, TAKE 1 TABLET BY MOUTH EVERY DAY FOR 30 DAYS, Disp: , Rfl:     ondansetron (ZOFRAN) 4 MG tablet, TAKE 1 TABLET BY MOUTH EVERY DAY AS NEEDED, Disp: , Rfl:     zolpidem (AMBIEN) 10 MG tablet, Take 1 tablet by mouth nightly as needed for Sleep for up to 30 days. , Disp: 30 tablet, Rfl: 2    hydroCHLOROthiazide (HYDRODIURIL) 25 MG tablet, Take 1 tablet by mouth every morning, Disp: 90 tablet, Rfl: 1    Multiple Vitamins-Minerals (MULTIVITAMIN WOMEN 50+ PO), Take 1 tablet by mouth daily, Disp: , Rfl:     morphine (MS CONTIN) 15 MG extended release tablet, TAKE 1 TABLET BY MOUTH EVERY 12 HOURS FOR 30 DAYS, Disp: , Rfl:     HYDROcodone-acetaminophen (NORCO)  MG per tablet, TAKE 1 TABLET BY MOUTH EVERY 8 HOURS FOR 30 DAYS, Disp: , Rfl:     buPROPion (WELLBUTRIN XL) 150 MG extended release tablet, Take 1 tablet by mouth every morning, Disp: 30 tablet, Rfl: 5    simvastatin (ZOCOR) 10 MG tablet, Take 1 tablet by mouth in the morning., Disp: 90 tablet, Rfl: 1    rOPINIRole (REQUIP) 1 MG tablet, Take 1 tablet by mouth in the morning., Disp: 90 tablet, Rfl: 1    potassium chloride (KLOR-CON M) 10 MEQ extended release tablet, Take 1 tablet by mouth in the morning., Disp: 90 tablet, Rfl: 1    ondansetron (ZOFRAN-ODT) 4 MG disintegrating tablet, Take 1 tablet by mouth every 8 hours as needed for Nausea, Disp: 90 tablet, Rfl: 2    omeprazole (PRILOSEC) 20 MG delayed release capsule, Take 1 capsule by mouth in the morning., Disp: 90 capsule, Rfl: 1    ibandronate (BONIVA) 150 MG tablet, Take 1 tablet by mouth every 30 days, Disp: 3 tablet, Rfl: 3    ergocalciferol (ERGOCALCIFEROL) 1.25 MG (58194 UT) capsule, Take 1 capsule by mouth every 7 days, Disp: 12 capsule, Rfl: 1    busPIRone (BUSPAR) 10 MG tablet, Take 1 tablet by mouth in the morning and 1 tablet at noon and 1 tablet before bedtime. , Disp: 270 tablet, Rfl: 1    butalbital-acetaminophen-caffeine (FIORICET, ESGIC) -40 MG per tablet, Take 1 tablet by mouth every 6 hours as needed, Disp: , Rfl:     DULoxetine (CYMBALTA) 60 MG extended release capsule, Take 60 mg by mouth 2 times daily, Disp: , Rfl:     gabapentin (NEURONTIN) 300 MG capsule, TAKE 2 CAPSULES BY MOUTH 3 TIMES A DAY, Disp: , Rfl:     ibuprofen (ADVIL;MOTRIN) 600 MG tablet, TAKE 1 TABLET BY MOUTH EVERY 6 HOURS AS NEEDED FOR PAIN, Disp: , Rfl:     morphine (AVINZA) 30 MG extended release capsule, Take 1 capsule by oral route every day, Disp: , Rfl:     Allergies   Allergen Reactions    Penicillins Hives and Swelling       Review of Systems   Constitutional:  Negative for chills, fatigue and fever. HENT:  Negative for congestion, postnasal drip, rhinorrhea and sinus pressure. Eyes:  Negative for visual disturbance. Respiratory:  Negative for cough, chest tightness and shortness of breath. Cardiovascular:  Negative for chest pain and palpitations. Gastrointestinal:  Negative for abdominal pain, blood in stool, diarrhea, nausea and vomiting. Genitourinary:  Negative for dysuria, frequency and urgency. Musculoskeletal:  Negative for arthralgias and myalgias. Skin: Negative. Neurological:  Negative for numbness and headaches. Psychiatric/Behavioral:  Negative for dysphoric mood and sleep disturbance. The patient is not nervous/anxious. Objective:  /75   Pulse 98   Temp 97.9 °F (36.6 °C) (Temporal)   Resp 16   Ht 5' 2\" (1.575 m)   Wt 108 lb 12.8 oz (49.4 kg)   SpO2 100%   BMI 19.90 kg/m²     Examination:  Physical Exam  Constitutional:       Appearance: Normal appearance. HENT:      Head: Normocephalic and atraumatic. Right Ear: Tympanic membrane and ear canal normal.      Left Ear: Tympanic membrane and ear canal normal.      Nose: Nose normal.      Mouth/Throat:      Mouth: Mucous membranes are moist.   Eyes:      Extraocular Movements: Extraocular movements intact. Conjunctiva/sclera: Conjunctivae normal.      Pupils: Pupils are equal, round, and reactive to light. Cardiovascular:      Rate and Rhythm: Normal rate and regular rhythm. Pulses: Normal pulses. Heart sounds: Normal heart sounds. Pulmonary:      Effort: Pulmonary effort is normal.      Breath sounds: Normal breath sounds. Abdominal:      General: Abdomen is flat. Bowel sounds are normal.      Palpations: Abdomen is soft. Musculoskeletal:      Cervical back: Normal range of motion and neck supple. Skin:     General: Skin is warm and dry. Neurological:      General: No focal deficit present. Mental Status: She is alert. Mental status is at baseline. Psychiatric:         Mood and Affect: Mood normal.         Thought Content: Thought content normal.         Assessment/Plan:    [place] was seen today for follow-up. Diagnoses and all orders for this visit:    Essential hypertension  Stable, continue medication, diet. Follow-up and Dispositions    Return if symptoms worsen or fail to improve, for keep f/u w/ labs in March 2023.          Medication Reconciliation:  Current Medications Verified: Current medications/ immunizations were reviewed, including purpose, with patient. Family History, Social History, Current and Past Medical History was reviewed with patient and updated at today's office visit. Medication Reconciliation list was given to patient/ family. Patient was advised to discard old medication lists and provide all providers with current list at each visit and carry list with them in case of emergency.     Completed By:   Gely White MD    [facility] & [address], [facility], [address] [place] [address]  [phone]  [phone] fax  2:17 PM

## 2022-11-17 DIAGNOSIS — F32.A ANXIETY AND DEPRESSION: ICD-10-CM

## 2022-11-17 DIAGNOSIS — F41.9 ANXIETY AND DEPRESSION: ICD-10-CM

## 2022-11-18 RX ORDER — BUSPIRONE HYDROCHLORIDE 10 MG/1
10 TABLET ORAL 3 TIMES DAILY
Qty: 270 TABLET | Refills: 1 | Status: SHIPPED | OUTPATIENT
Start: 2022-11-18

## 2022-11-18 RX ORDER — BUSPIRONE HYDROCHLORIDE 10 MG/1
TABLET ORAL
Qty: 270 TABLET | Refills: 0 | OUTPATIENT
Start: 2022-11-18

## 2022-12-16 ENCOUNTER — TELEMEDICINE (OUTPATIENT)
Dept: INTERNAL MEDICINE CLINIC | Facility: CLINIC | Age: 62
End: 2022-12-16
Payer: SELF-PAY

## 2022-12-16 DIAGNOSIS — E04.1 THYROID NODULE: Primary | ICD-10-CM

## 2022-12-16 PROBLEM — M96.1 LUMBAR POST-LAMINECTOMY SYNDROME: Status: ACTIVE | Noted: 2022-12-16

## 2022-12-16 PROBLEM — M47.812 CERVICAL SPONDYLOSIS WITHOUT MYELOPATHY: Status: ACTIVE | Noted: 2022-12-16

## 2022-12-16 PROCEDURE — 99213 OFFICE O/P EST LOW 20 MIN: CPT | Performed by: INTERNAL MEDICINE

## 2022-12-16 RX ORDER — ONDANSETRON HYDROCHLORIDE 4 MG/5ML
SOLUTION ORAL
COMMUNITY

## 2022-12-16 RX ORDER — DICLOFENAC POTASSIUM 50 MG/1
TABLET, FILM COATED ORAL
COMMUNITY

## 2022-12-16 RX ORDER — GABAPENTIN 250 MG/5ML
SOLUTION ORAL
COMMUNITY

## 2022-12-16 RX ORDER — OXYCODONE AND ACETAMINOPHEN 10; 325 MG/1; MG/1
TABLET ORAL
COMMUNITY
Start: 2022-12-13

## 2022-12-16 ASSESSMENT — ENCOUNTER SYMPTOMS: RESPIRATORY NEGATIVE: 1

## 2022-12-16 NOTE — PROGRESS NOTES
[facility]      2022    Patient Name: Gokul Mathews  :  1960    Subjective:    Chief Complaint:  Chief Complaint   Patient presents with    Follow-up         HPI I was at home while conducting this encounter. Consent:  She and/or her healthcare decision maker is aware that this patient-initiated Telehealth encounter is a billable service, with coverage as determined by her insurance carrier. She is aware that she may receive a bill and has provided verbal consent to proceed: Yes    This virtual visit was conducted via [employer]. Pursuant to the emergency declaration under the [facility], [facility] waiver authority and the Imaging[employer] and [employer] Act, this Virtual  Visit was conducted to reduce the patient's risk of exposure to COVID-19 and provide continuity of care for an established patient. Services were provided through a video synchronous discussion virtually to substitute for in-person clinic visit. Due to this being a TeleHealth evaluation, many elements of the physical examination are unable to be assessed. Total Time: minutes: 5-10 minutes. Patient evaluated for f/u; Pain Management referred her for MRI of cspine last week at [address]; I am unable to access her report, but she has a hardcopy and was able to read it off for me; she was noted to have 6 mm nodule in R thyroid lobe; she denies dizziness, palpitations, or other concerning symptoms;       Past Medical History:   Diagnosis Date    Anxiety     Back problem     Depression     Ex-smoker for less than 1 year     Quit 2018  1 ppd x 30 yrs    Family history of anesthesia complication     Patient's father developed an arrhythmia in PACU s/p hip arthroplasty    GERD (gastroesophageal reflux disease)     well controlled with meds    History of bone density study 2020    osteoporosis    History of mammogram 2020    History of Papanicolaou smear of cervix >5 years aog    Hyperlipidemia     Hypertension     hctz    Nicotine vapor product user     0.9%    Sleep apnea     no CPAP       Past Surgical History:   Procedure Laterality Date    BREAST BIOPSY Left     BREAST SURGERY      CHOLECYSTECTOMY, LAPAROSCOPIC          GASTRIC BYPASS SURGERY      Dr. 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    LUMBAR LAMINECTOMY  03/15/2019    right L4-5 Lami    NEUROLOGICAL SURGERY  2019    repair of CSF leak right L4-5    LATIA AND BSO (CERVIX REMOVED)      TOTAL ABDOM HYSTERECTOMY         Family History   Problem Relation Age of Onset    Stroke Paternal Grandfather     Stroke Paternal Grandmother     Stroke Maternal Grandfather     Breast Cancer Maternal Grandmother 76    Breast Cancer Mother 79    Hypertension Father     Stroke Father     Hypertension Mother     Stroke Maternal Grandmother        Social History     Tobacco Use    Smoking status: Former     Packs/day: 1.00     Types: Cigarettes     Quit date: 2018     Years since quittin.9    Smokeless tobacco: Never   Substance Use Topics    Alcohol use: Not Currently    Drug use: Yes     Types: Marijuana Garrel Calender)                 Current Outpatient Medications:     Morphine Sulfate ER 15 MG T12A, 1 tablet, Disp: , Rfl:     oxyCODONE-acetaminophen (PERCOCET)  MG per tablet, 1 tablet, Disp: , Rfl:     diclofenac (CATAFLAM) 50 MG tablet, 1 tablet with food or milk as needed, Disp: , Rfl:     Gabapentin 300 MG/6ML SOLN, 2 capsules, Disp: , Rfl:     ondansetron (ZOFRAN) 4 MG/5ML solution, 1 tablet, Disp: , Rfl:     busPIRone (BUSPAR) 10 MG tablet, Take 1 tablet by mouth 3 times daily, Disp: 270 tablet, Rfl: 1    diclofenac (CATAFLAM) 50 MG tablet, TAKE 1 TABLET BY MOUTH TWICE A DAY WITH FOOD OR MILK AS NEEDED FOR 30 DAYS, Disp: , Rfl:     meloxicam (MOBIC) 7.5 MG tablet, TAKE 1 TABLET BY MOUTH EVERY DAY FOR 30 DAYS, Disp: , Rfl:     ondansetron (ZOFRAN) 4 MG tablet, TAKE 1 TABLET BY MOUTH EVERY DAY AS NEEDED, Disp: , Rfl:     hydroCHLOROthiazide (HYDRODIURIL) 25 MG tablet, Take 1 tablet by mouth every morning, Disp: 90 tablet, Rfl: 1    Multiple Vitamins-Minerals (MULTIVITAMIN WOMEN 50+ PO), Take 1 tablet by mouth daily, Disp: , Rfl:     morphine (MS CONTIN) 15 MG extended release tablet, TAKE 1 TABLET BY MOUTH EVERY 12 HOURS FOR 30 DAYS, Disp: , Rfl:     HYDROcodone-acetaminophen (NORCO)  MG per tablet, TAKE 1 TABLET BY MOUTH EVERY 8 HOURS FOR 30 DAYS, Disp: , Rfl:     buPROPion (WELLBUTRIN XL) 150 MG extended release tablet, Take 1 tablet by mouth every morning, Disp: 30 tablet, Rfl: 5    simvastatin (ZOCOR) 10 MG tablet, Take 1 tablet by mouth in the morning., Disp: 90 tablet, Rfl: 1    rOPINIRole (REQUIP) 1 MG tablet, Take 1 tablet by mouth in the morning., Disp: 90 tablet, Rfl: 1    potassium chloride (KLOR-CON M) 10 MEQ extended release tablet, Take 1 tablet by mouth in the morning., Disp: 90 tablet, Rfl: 1    ondansetron (ZOFRAN-ODT) 4 MG disintegrating tablet, Take 1 tablet by mouth every 8 hours as needed for Nausea, Disp: 90 tablet, Rfl: 2    omeprazole (PRILOSEC) 20 MG delayed release capsule, Take 1 capsule by mouth in the morning., Disp: 90 capsule, Rfl: 1    ibandronate (BONIVA) 150 MG tablet, Take 1 tablet by mouth every 30 days, Disp: 3 tablet, Rfl: 3    ergocalciferol (ERGOCALCIFEROL) 1.25 MG (47758 UT) capsule, Take 1 capsule by mouth every 7 days, Disp: 12 capsule, Rfl: 1    butalbital-acetaminophen-caffeine (FIORICET, ESGIC) -40 MG per tablet, Take 1 tablet by mouth every 6 hours as needed, Disp: , Rfl:     DULoxetine (CYMBALTA) 60 MG extended release capsule, Take 60 mg by mouth 2 times daily, Disp: , Rfl:     gabapentin (NEURONTIN) 300 MG capsule, TAKE 2 CAPSULES BY MOUTH 3 TIMES A DAY, Disp: , Rfl:     ibuprofen (ADVIL;MOTRIN) 600 MG tablet, TAKE 1 TABLET BY MOUTH EVERY 6 HOURS AS NEEDED FOR PAIN, Disp: , Rfl:     morphine (AVINZA) 30 MG extended release capsule, Take 1 capsule by oral route every day, Disp: , Rfl:     Allergies   Allergen Reactions    Penicillins Hives and Swelling       Review of Systems   Constitutional: Negative. HENT: Negative. Respiratory: Negative. Cardiovascular: Negative. Objective: There were no vitals taken for this visit. Examination:  Physical Exam  Neurological:      Mental Status: She is alert. Psychiatric:         Mood and Affect: Mood normal.         Thought Content: Thought content normal.         Judgment: Judgment normal.         Assessment/Plan:    Yordan Salcido was seen today for follow-up. Diagnoses and all orders for this visit:    Thyroid nodule  -     US THYROID; Future  -     T4, Free; Future  -     TSH; Future        Follow-up and Dispositions    Return if symptoms worsen or fail to improve, for schedule lab only next week for TFT; keep f/u w/ labs in March; Tianna Reyez Medication Reconciliation:  Current Medications Verified: Current medications/ immunizations were reviewed, including purpose, with patient. Family History, Social History, Current and Past Medical History was reviewed with patient and updated at today's office visit. Medication Reconciliation list was given to patient/ family. Patient was advised to discard old medication lists and provide all providers with current list at each visit and carry list with them in case of emergency.     Completed By:   Antonieta Beal MD    [facility] & COUNTRY  [address] [place] [address], [facility] [place]  [place], [address]  [phone]  [phone] fax  8:25 AM

## 2022-12-23 ENCOUNTER — HOSPITAL ENCOUNTER (OUTPATIENT)
Dept: ULTRASOUND IMAGING | Age: 62
Discharge: HOME | End: 2022-12-23
Payer: COMMERCIAL

## 2022-12-23 DIAGNOSIS — E04.1 THYROID NODULE: ICD-10-CM

## 2022-12-23 LAB
T4 FREE SERPL-MCNC: 0.9 NG/DL (ref 0.78–1.46)
TSH, 3RD GENERATION: 0.73 UIU/ML (ref 0.36–3.74)

## 2022-12-23 PROCEDURE — 76536 US EXAM OF HEAD AND NECK: CPT

## 2023-01-09 DIAGNOSIS — G25.81 RESTLESS LEG SYNDROME: ICD-10-CM

## 2023-01-09 DIAGNOSIS — I10 ESSENTIAL HYPERTENSION: ICD-10-CM

## 2023-01-09 DIAGNOSIS — R60.0 LEG EDEMA: ICD-10-CM

## 2023-01-10 ENCOUNTER — HOSPITAL ENCOUNTER (EMERGENCY)
Age: 63
Discharge: ANOTHER HOSPITAL | End: 2023-01-11
Attending: EMERGENCY MEDICINE
Payer: COMMERCIAL

## 2023-01-10 ENCOUNTER — APPOINTMENT (OUTPATIENT)
Dept: CT IMAGING | Age: 63
End: 2023-01-10
Payer: COMMERCIAL

## 2023-01-10 DIAGNOSIS — E87.6 HYPOKALEMIA: ICD-10-CM

## 2023-01-10 DIAGNOSIS — K56.609 SBO (SMALL BOWEL OBSTRUCTION) (HCC): Primary | ICD-10-CM

## 2023-01-10 DIAGNOSIS — D72.829 LEUKOCYTOSIS, UNSPECIFIED TYPE: ICD-10-CM

## 2023-01-10 DIAGNOSIS — R10.84 GENERALIZED ABDOMINAL PAIN: ICD-10-CM

## 2023-01-10 LAB
ALBUMIN SERPL-MCNC: 3.9 G/DL (ref 3.2–4.6)
ALBUMIN/GLOB SERPL: 1.6 (ref 0.4–1.6)
ALP SERPL-CCNC: 57 U/L (ref 45–117)
ALT SERPL-CCNC: 13 U/L (ref 13–61)
ANION GAP SERPL CALC-SCNC: 12 MMOL/L (ref 2–11)
AST SERPL-CCNC: 14 U/L (ref 15–37)
BASOPHILS # BLD: 0.1 K/UL (ref 0–0.2)
BASOPHILS NFR BLD: 1 % (ref 0–2)
BILIRUB SERPL-MCNC: 0.3 MG/DL (ref 0.2–1.1)
BUN SERPL-MCNC: 23 MG/DL (ref 7–18)
CALCIUM SERPL-MCNC: 9.2 MG/DL (ref 8.3–10.4)
CHLORIDE SERPL-SCNC: 96 MMOL/L (ref 98–107)
CO2 SERPL-SCNC: 31 MMOL/L (ref 21–32)
CREAT SERPL-MCNC: 0.7 MG/DL (ref 0.6–1)
DIFFERENTIAL METHOD BLD: ABNORMAL
EOSINOPHIL # BLD: 0.1 K/UL (ref 0–0.8)
EOSINOPHIL NFR BLD: 1 % (ref 0.5–7.8)
ERYTHROCYTE [DISTWIDTH] IN BLOOD BY AUTOMATED COUNT: 13.5 % (ref 11.9–14.6)
GLOBULIN SER CALC-MCNC: 2.5 G/DL (ref 2.8–4.5)
GLUCOSE SERPL-MCNC: 129 MG/DL (ref 65–100)
HCT VFR BLD AUTO: 38.6 % (ref 35.8–46.3)
HGB BLD-MCNC: 12.5 G/DL (ref 11.7–15.4)
IMM GRANULOCYTES # BLD AUTO: 0.1 K/UL (ref 0–0.5)
IMM GRANULOCYTES NFR BLD AUTO: 0 % (ref 0–5)
LACTATE SERPL-SCNC: 1.6 MMOL/L (ref 0.4–2)
LIPASE SERPL-CCNC: 19 U/L (ref 13–60)
LYMPHOCYTES # BLD: 1.9 K/UL (ref 0.5–4.6)
LYMPHOCYTES NFR BLD: 13 % (ref 13–44)
MCH RBC QN AUTO: 27.2 PG (ref 26.1–32.9)
MCHC RBC AUTO-ENTMCNC: 32.4 G/DL (ref 31.4–35)
MCV RBC AUTO: 83.9 FL (ref 82–102)
MONOCYTES # BLD: 1.7 K/UL (ref 0.1–1.3)
MONOCYTES NFR BLD: 11 % (ref 4–12)
NEUTS SEG # BLD: 11.5 K/UL (ref 1.7–8.2)
NEUTS SEG NFR BLD: 75 % (ref 43–78)
NRBC # BLD: 0 K/UL (ref 0–0.2)
PLATELET # BLD AUTO: 684 K/UL (ref 150–450)
PMV BLD AUTO: 8.5 FL (ref 9.4–12.3)
POTASSIUM SERPL-SCNC: 3.1 MMOL/L (ref 3.5–5.1)
PROT SERPL-MCNC: 6.4 G/DL (ref 6.4–8.2)
RBC # BLD AUTO: 4.6 M/UL (ref 4.05–5.2)
SODIUM SERPL-SCNC: 139 MMOL/L (ref 133–143)
WBC # BLD AUTO: 15.3 K/UL (ref 4.3–11.1)

## 2023-01-10 PROCEDURE — 83605 ASSAY OF LACTIC ACID: CPT

## 2023-01-10 PROCEDURE — 6360000004 HC RX CONTRAST MEDICATION: Performed by: PHYSICIAN ASSISTANT

## 2023-01-10 PROCEDURE — 85025 COMPLETE CBC W/AUTO DIFF WBC: CPT

## 2023-01-10 PROCEDURE — 6360000002 HC RX W HCPCS: Performed by: PHYSICIAN ASSISTANT

## 2023-01-10 PROCEDURE — 2500000003 HC RX 250 WO HCPCS: Performed by: PHYSICIAN ASSISTANT

## 2023-01-10 PROCEDURE — 96374 THER/PROPH/DIAG INJ IV PUSH: CPT

## 2023-01-10 PROCEDURE — 83690 ASSAY OF LIPASE: CPT

## 2023-01-10 PROCEDURE — 80053 COMPREHEN METABOLIC PANEL: CPT

## 2023-01-10 PROCEDURE — 96376 TX/PRO/DX INJ SAME DRUG ADON: CPT

## 2023-01-10 PROCEDURE — 74177 CT ABD & PELVIS W/CONTRAST: CPT

## 2023-01-10 PROCEDURE — 99285 EMERGENCY DEPT VISIT HI MDM: CPT

## 2023-01-10 PROCEDURE — 96375 TX/PRO/DX INJ NEW DRUG ADDON: CPT

## 2023-01-10 PROCEDURE — 2580000003 HC RX 258: Performed by: PHYSICIAN ASSISTANT

## 2023-01-10 PROCEDURE — 96361 HYDRATE IV INFUSION ADD-ON: CPT

## 2023-01-10 PROCEDURE — A4216 STERILE WATER/SALINE, 10 ML: HCPCS | Performed by: PHYSICIAN ASSISTANT

## 2023-01-10 RX ORDER — 0.9 % SODIUM CHLORIDE 0.9 %
100 INTRAVENOUS SOLUTION INTRAVENOUS
Status: COMPLETED | OUTPATIENT
Start: 2023-01-10 | End: 2023-01-10

## 2023-01-10 RX ORDER — MORPHINE SULFATE 4 MG/ML
4 INJECTION INTRAVENOUS ONCE
Status: COMPLETED | OUTPATIENT
Start: 2023-01-10 | End: 2023-01-10

## 2023-01-10 RX ORDER — 0.9 % SODIUM CHLORIDE 0.9 %
1000 INTRAVENOUS SOLUTION INTRAVENOUS
Status: COMPLETED | OUTPATIENT
Start: 2023-01-10 | End: 2023-01-10

## 2023-01-10 RX ORDER — ONDANSETRON 2 MG/ML
4 INJECTION INTRAMUSCULAR; INTRAVENOUS
Status: COMPLETED | OUTPATIENT
Start: 2023-01-10 | End: 2023-01-10

## 2023-01-10 RX ORDER — SODIUM CHLORIDE 0.9 % (FLUSH) 0.9 %
10 SYRINGE (ML) INJECTION
Status: COMPLETED | OUTPATIENT
Start: 2023-01-10 | End: 2023-01-10

## 2023-01-10 RX ADMIN — FAMOTIDINE 20 MG: 10 INJECTION, SOLUTION INTRAVENOUS at 23:57

## 2023-01-10 RX ADMIN — DIATRIZOATE MEGLUMINE AND DIATRIZOATE SODIUM 15 ML: 660; 100 LIQUID ORAL; RECTAL at 19:43

## 2023-01-10 RX ADMIN — MORPHINE SULFATE 4 MG: 4 INJECTION INTRAVENOUS at 23:57

## 2023-01-10 RX ADMIN — MORPHINE SULFATE 4 MG: 4 INJECTION INTRAVENOUS at 19:40

## 2023-01-10 RX ADMIN — ONDANSETRON 4 MG: 2 INJECTION INTRAMUSCULAR; INTRAVENOUS at 19:40

## 2023-01-10 RX ADMIN — SODIUM CHLORIDE 1000 ML: 9 INJECTION, SOLUTION INTRAVENOUS at 19:39

## 2023-01-10 RX ADMIN — IOHEXOL 100 ML: 350 INJECTION, SOLUTION INTRAVENOUS at 21:21

## 2023-01-10 RX ADMIN — SODIUM CHLORIDE, PRESERVATIVE FREE 10 ML: 5 INJECTION INTRAVENOUS at 21:22

## 2023-01-10 RX ADMIN — SODIUM CHLORIDE 100 ML: 9 INJECTION, SOLUTION INTRAVENOUS at 21:22

## 2023-01-10 ASSESSMENT — ENCOUNTER SYMPTOMS
SHORTNESS OF BREATH: 0
RECTAL PAIN: 0
VOMITING: 1
ABDOMINAL PAIN: 1
COUGH: 0
ABDOMINAL DISTENTION: 1
EYE REDNESS: 0
NAUSEA: 1
BACK PAIN: 0
CONSTIPATION: 0
RHINORRHEA: 0
SORE THROAT: 0
DIARRHEA: 0
CHEST TIGHTNESS: 0

## 2023-01-10 ASSESSMENT — PAIN SCALES - GENERAL
PAINLEVEL_OUTOF10: 7
PAINLEVEL_OUTOF10: 9
PAINLEVEL_OUTOF10: 8

## 2023-01-10 ASSESSMENT — PAIN DESCRIPTION - ORIENTATION
ORIENTATION: RIGHT;LEFT;MID;LOWER;UPPER
ORIENTATION: UPPER

## 2023-01-10 ASSESSMENT — PAIN DESCRIPTION - DESCRIPTORS
DESCRIPTORS: SHARP

## 2023-01-10 ASSESSMENT — PAIN DESCRIPTION - LOCATION
LOCATION: ABDOMEN

## 2023-01-10 ASSESSMENT — LIFESTYLE VARIABLES: HOW MANY STANDARD DRINKS CONTAINING ALCOHOL DO YOU HAVE ON A TYPICAL DAY: PATIENT DOES NOT DRINK

## 2023-01-10 ASSESSMENT — PAIN - FUNCTIONAL ASSESSMENT
PAIN_FUNCTIONAL_ASSESSMENT: ACTIVITIES ARE NOT PREVENTED
PAIN_FUNCTIONAL_ASSESSMENT: 0-10

## 2023-01-11 ENCOUNTER — HOSPITAL ENCOUNTER (INPATIENT)
Age: 63
LOS: 2 days | Discharge: HOME | End: 2023-01-13
Attending: FAMILY MEDICINE | Admitting: HOSPITALIST
Payer: COMMERCIAL

## 2023-01-11 ENCOUNTER — APPOINTMENT (OUTPATIENT)
Dept: GENERAL RADIOLOGY | Age: 63
End: 2023-01-11
Attending: FAMILY MEDICINE
Payer: COMMERCIAL

## 2023-01-11 VITALS
BODY MASS INDEX: 23 KG/M2 | WEIGHT: 125 LBS | RESPIRATION RATE: 16 BRPM | SYSTOLIC BLOOD PRESSURE: 105 MMHG | OXYGEN SATURATION: 97 % | HEIGHT: 62 IN | DIASTOLIC BLOOD PRESSURE: 74 MMHG | TEMPERATURE: 98.4 F | HEART RATE: 115 BPM

## 2023-01-11 DIAGNOSIS — G25.81 RESTLESS LEG SYNDROME: ICD-10-CM

## 2023-01-11 PROBLEM — R93.5 ABNORMAL CT OF THE ABDOMEN: Status: ACTIVE | Noted: 2023-01-11

## 2023-01-11 PROBLEM — E78.2 MIXED HYPERLIPIDEMIA: Status: ACTIVE | Noted: 2018-12-20

## 2023-01-11 PROBLEM — E87.6 HYPOKALEMIA: Status: ACTIVE | Noted: 2023-01-11

## 2023-01-11 PROBLEM — R14.0 ABDOMINAL DISTENTION: Status: ACTIVE | Noted: 2023-01-11

## 2023-01-11 PROBLEM — K56.609 SMALL BOWEL OBSTRUCTION (HCC): Status: ACTIVE | Noted: 2023-01-11

## 2023-01-11 PROBLEM — K56.609 SBO (SMALL BOWEL OBSTRUCTION) (HCC): Status: ACTIVE | Noted: 2023-01-11

## 2023-01-11 PROBLEM — Z98.84 HISTORY OF GASTRIC BYPASS: Status: ACTIVE | Noted: 2018-12-20

## 2023-01-11 PROBLEM — I10 ESSENTIAL HYPERTENSION: Status: ACTIVE | Noted: 2018-12-20

## 2023-01-11 LAB
ANION GAP SERPL CALC-SCNC: 6 MMOL/L (ref 2–11)
BASOPHILS # BLD: 0.1 K/UL (ref 0–0.2)
BASOPHILS NFR BLD: 1 % (ref 0–2)
BUN SERPL-MCNC: 14 MG/DL (ref 8–23)
CALCIUM SERPL-MCNC: 8.3 MG/DL (ref 8.3–10.4)
CHLORIDE SERPL-SCNC: 102 MMOL/L (ref 101–110)
CO2 SERPL-SCNC: 32 MMOL/L (ref 21–32)
CREAT SERPL-MCNC: 0.7 MG/DL (ref 0.6–1)
DIFFERENTIAL METHOD BLD: ABNORMAL
EOSINOPHIL # BLD: 0.1 K/UL (ref 0–0.8)
EOSINOPHIL NFR BLD: 1 % (ref 0.5–7.8)
ERYTHROCYTE [DISTWIDTH] IN BLOOD BY AUTOMATED COUNT: 13.5 % (ref 11.9–14.6)
GLUCOSE BLD STRIP.AUTO-MCNC: 110 MG/DL (ref 65–100)
GLUCOSE SERPL-MCNC: 105 MG/DL (ref 65–100)
HCT VFR BLD AUTO: 32.8 % (ref 35.8–46.3)
HGB BLD-MCNC: 10.3 G/DL (ref 11.7–15.4)
IMM GRANULOCYTES # BLD AUTO: 0 K/UL (ref 0–0.5)
IMM GRANULOCYTES NFR BLD AUTO: 0 % (ref 0–5)
LACTATE SERPL-SCNC: 1 MMOL/L (ref 0.4–2)
LYMPHOCYTES # BLD: 1.7 K/UL (ref 0.5–4.6)
LYMPHOCYTES NFR BLD: 22 % (ref 13–44)
MCH RBC QN AUTO: 27.4 PG (ref 26.1–32.9)
MCHC RBC AUTO-ENTMCNC: 31.4 G/DL (ref 31.4–35)
MCV RBC AUTO: 87.2 FL (ref 82–102)
MONOCYTES # BLD: 0.9 K/UL (ref 0.1–1.3)
MONOCYTES NFR BLD: 11 % (ref 4–12)
NEUTS SEG # BLD: 5.1 K/UL (ref 1.7–8.2)
NEUTS SEG NFR BLD: 65 % (ref 43–78)
NRBC # BLD: 0 K/UL (ref 0–0.2)
PLATELET # BLD AUTO: 492 K/UL (ref 150–450)
PMV BLD AUTO: 8.9 FL (ref 9.4–12.3)
POTASSIUM SERPL-SCNC: 3.1 MMOL/L (ref 3.5–5.1)
RBC # BLD AUTO: 3.76 M/UL (ref 4.05–5.2)
SERVICE CMNT-IMP: ABNORMAL
SODIUM SERPL-SCNC: 140 MMOL/L (ref 133–143)
WBC # BLD AUTO: 7.8 K/UL (ref 4.3–11.1)

## 2023-01-11 PROCEDURE — A4216 STERILE WATER/SALINE, 10 ML: HCPCS | Performed by: HOSPITALIST

## 2023-01-11 PROCEDURE — 80048 BASIC METABOLIC PNL TOTAL CA: CPT

## 2023-01-11 PROCEDURE — 99223 1ST HOSP IP/OBS HIGH 75: CPT | Performed by: SURGERY

## 2023-01-11 PROCEDURE — C9113 INJ PANTOPRAZOLE SODIUM, VIA: HCPCS | Performed by: HOSPITALIST

## 2023-01-11 PROCEDURE — 6370000000 HC RX 637 (ALT 250 FOR IP): Performed by: SURGERY

## 2023-01-11 PROCEDURE — 83605 ASSAY OF LACTIC ACID: CPT

## 2023-01-11 PROCEDURE — 1100000000 HC RM PRIVATE

## 2023-01-11 PROCEDURE — 85025 COMPLETE CBC W/AUTO DIFF WBC: CPT

## 2023-01-11 PROCEDURE — 74018 RADEX ABDOMEN 1 VIEW: CPT

## 2023-01-11 PROCEDURE — 6360000002 HC RX W HCPCS: Performed by: HOSPITALIST

## 2023-01-11 PROCEDURE — 36415 COLL VENOUS BLD VENIPUNCTURE: CPT

## 2023-01-11 PROCEDURE — 6370000000 HC RX 637 (ALT 250 FOR IP): Performed by: HOSPITALIST

## 2023-01-11 PROCEDURE — 82962 GLUCOSE BLOOD TEST: CPT

## 2023-01-11 PROCEDURE — 2500000003 HC RX 250 WO HCPCS: Performed by: HOSPITALIST

## 2023-01-11 PROCEDURE — 2580000003 HC RX 258: Performed by: HOSPITALIST

## 2023-01-11 RX ORDER — ROPINIROLE 1 MG/1
1 TABLET, FILM COATED ORAL DAILY
Qty: 90 TABLET | Refills: 1 | Status: SHIPPED | OUTPATIENT
Start: 2023-01-11 | End: 2023-01-13 | Stop reason: HOSPADM

## 2023-01-11 RX ORDER — BISACODYL 10 MG
10 SUPPOSITORY, RECTAL RECTAL DAILY
Status: DISCONTINUED | OUTPATIENT
Start: 2023-01-11 | End: 2023-01-13 | Stop reason: HOSPADM

## 2023-01-11 RX ORDER — TRAMADOL HYDROCHLORIDE 50 MG/1
25 TABLET ORAL EVERY 6 HOURS PRN
Status: DISCONTINUED | OUTPATIENT
Start: 2023-01-11 | End: 2023-01-13 | Stop reason: HOSPADM

## 2023-01-11 RX ORDER — ACETAMINOPHEN 650 MG/1
650 SUPPOSITORY RECTAL EVERY 6 HOURS PRN
Status: DISCONTINUED | OUTPATIENT
Start: 2023-01-11 | End: 2023-01-13 | Stop reason: HOSPADM

## 2023-01-11 RX ORDER — HYDROCHLOROTHIAZIDE 25 MG/1
25 TABLET ORAL EVERY MORNING
Qty: 90 TABLET | Refills: 1 | Status: SHIPPED | OUTPATIENT
Start: 2023-01-11

## 2023-01-11 RX ORDER — ONDANSETRON 2 MG/ML
4 INJECTION INTRAMUSCULAR; INTRAVENOUS EVERY 6 HOURS PRN
Status: DISCONTINUED | OUTPATIENT
Start: 2023-01-11 | End: 2023-01-13 | Stop reason: HOSPADM

## 2023-01-11 RX ORDER — ACETAMINOPHEN 325 MG/1
650 TABLET ORAL EVERY 6 HOURS PRN
Status: DISCONTINUED | OUTPATIENT
Start: 2023-01-11 | End: 2023-01-13 | Stop reason: HOSPADM

## 2023-01-11 RX ORDER — SODIUM CHLORIDE 0.9 % (FLUSH) 0.9 %
5-40 SYRINGE (ML) INJECTION PRN
Status: DISCONTINUED | OUTPATIENT
Start: 2023-01-11 | End: 2023-01-13 | Stop reason: HOSPADM

## 2023-01-11 RX ORDER — SENNA PLUS 8.6 MG/1
1 TABLET ORAL 2 TIMES DAILY
Status: DISCONTINUED | OUTPATIENT
Start: 2023-01-11 | End: 2023-01-13 | Stop reason: HOSPADM

## 2023-01-11 RX ORDER — DEXTROSE, SODIUM CHLORIDE, AND POTASSIUM CHLORIDE 5; .45; .15 G/100ML; G/100ML; G/100ML
INJECTION INTRAVENOUS CONTINUOUS
Status: DISCONTINUED | OUTPATIENT
Start: 2023-01-11 | End: 2023-01-12

## 2023-01-11 RX ORDER — GABAPENTIN 300 MG/1
600 CAPSULE ORAL 3 TIMES DAILY
Status: DISCONTINUED | OUTPATIENT
Start: 2023-01-11 | End: 2023-01-13 | Stop reason: HOSPADM

## 2023-01-11 RX ORDER — MAGNESIUM HYDROXIDE/ALUMINUM HYDROXICE/SIMETHICONE 120; 1200; 1200 MG/30ML; MG/30ML; MG/30ML
30 SUSPENSION ORAL EVERY 6 HOURS PRN
Status: DISCONTINUED | OUTPATIENT
Start: 2023-01-11 | End: 2023-01-13 | Stop reason: HOSPADM

## 2023-01-11 RX ORDER — SODIUM CHLORIDE 9 MG/ML
INJECTION, SOLUTION INTRAVENOUS PRN
Status: DISCONTINUED | OUTPATIENT
Start: 2023-01-11 | End: 2023-01-13 | Stop reason: HOSPADM

## 2023-01-11 RX ORDER — MORPHINE SULFATE 2 MG/ML
2 INJECTION, SOLUTION INTRAMUSCULAR; INTRAVENOUS EVERY 4 HOURS PRN
Status: DISCONTINUED | OUTPATIENT
Start: 2023-01-11 | End: 2023-01-13 | Stop reason: HOSPADM

## 2023-01-11 RX ORDER — ONDANSETRON 4 MG/1
4 TABLET, ORALLY DISINTEGRATING ORAL EVERY 8 HOURS PRN
Status: DISCONTINUED | OUTPATIENT
Start: 2023-01-11 | End: 2023-01-13 | Stop reason: HOSPADM

## 2023-01-11 RX ORDER — HYDROCODONE BITARTRATE AND ACETAMINOPHEN 10; 325 MG/1; MG/1
1 TABLET ORAL EVERY 6 HOURS PRN
Status: DISCONTINUED | OUTPATIENT
Start: 2023-01-11 | End: 2023-01-13 | Stop reason: HOSPADM

## 2023-01-11 RX ORDER — ROPINIROLE 1 MG/1
1 TABLET, FILM COATED ORAL NIGHTLY
Status: DISCONTINUED | OUTPATIENT
Start: 2023-01-11 | End: 2023-01-13 | Stop reason: HOSPADM

## 2023-01-11 RX ORDER — BUSPIRONE HYDROCHLORIDE 10 MG/1
10 TABLET ORAL 3 TIMES DAILY
Status: DISCONTINUED | OUTPATIENT
Start: 2023-01-11 | End: 2023-01-13 | Stop reason: HOSPADM

## 2023-01-11 RX ORDER — POTASSIUM CHLORIDE 750 MG/1
10 TABLET, EXTENDED RELEASE ORAL DAILY
Status: DISCONTINUED | OUTPATIENT
Start: 2023-01-11 | End: 2023-01-13 | Stop reason: HOSPADM

## 2023-01-11 RX ORDER — ENOXAPARIN SODIUM 100 MG/ML
30 INJECTION SUBCUTANEOUS DAILY
Status: DISCONTINUED | OUTPATIENT
Start: 2023-01-11 | End: 2023-01-13 | Stop reason: HOSPADM

## 2023-01-11 RX ORDER — TRAMADOL HYDROCHLORIDE 50 MG/1
50 TABLET ORAL EVERY 6 HOURS PRN
Status: DISCONTINUED | OUTPATIENT
Start: 2023-01-11 | End: 2023-01-13 | Stop reason: HOSPADM

## 2023-01-11 RX ORDER — SODIUM CHLORIDE 0.9 % (FLUSH) 0.9 %
5-40 SYRINGE (ML) INJECTION EVERY 12 HOURS SCHEDULED
Status: DISCONTINUED | OUTPATIENT
Start: 2023-01-11 | End: 2023-01-13 | Stop reason: HOSPADM

## 2023-01-11 RX ADMIN — BUSPIRONE HYDROCHLORIDE 10 MG: 10 TABLET ORAL at 13:33

## 2023-01-11 RX ADMIN — ENOXAPARIN SODIUM 30 MG: 100 INJECTION SUBCUTANEOUS at 10:47

## 2023-01-11 RX ADMIN — ACETAMINOPHEN 650 MG: 325 TABLET ORAL at 04:55

## 2023-01-11 RX ADMIN — POTASSIUM CHLORIDE 10 MEQ: 750 TABLET, EXTENDED RELEASE ORAL at 10:46

## 2023-01-11 RX ADMIN — SENNOSIDES 8.6 MG: 8.6 TABLET, FILM COATED ORAL at 20:26

## 2023-01-11 RX ADMIN — ROPINIROLE HYDROCHLORIDE 1 MG: 2 TABLET, FILM COATED ORAL at 20:27

## 2023-01-11 RX ADMIN — SODIUM CHLORIDE, PRESERVATIVE FREE 10 ML: 5 INJECTION INTRAVENOUS at 10:48

## 2023-01-11 RX ADMIN — BUSPIRONE HYDROCHLORIDE 10 MG: 10 TABLET ORAL at 20:27

## 2023-01-11 RX ADMIN — SENNOSIDES 8.6 MG: 8.6 TABLET, FILM COATED ORAL at 10:46

## 2023-01-11 RX ADMIN — BISACODYL 10 MG: 10 SUPPOSITORY RECTAL at 10:48

## 2023-01-11 RX ADMIN — HYDROCODONE BITARTRATE AND ACETAMINOPHEN 1 TABLET: 10; 325 TABLET ORAL at 13:33

## 2023-01-11 RX ADMIN — GABAPENTIN 600 MG: 300 CAPSULE ORAL at 13:33

## 2023-01-11 RX ADMIN — DEXTROSE MONOHYDRATE, SODIUM CHLORIDE, AND POTASSIUM CHLORIDE: 50; 4.5; 1.49 INJECTION, SOLUTION INTRAVENOUS at 04:49

## 2023-01-11 RX ADMIN — GABAPENTIN 600 MG: 300 CAPSULE ORAL at 20:26

## 2023-01-11 RX ADMIN — DEXTROSE MONOHYDRATE, SODIUM CHLORIDE, AND POTASSIUM CHLORIDE: 50; 4.5; 1.49 INJECTION, SOLUTION INTRAVENOUS at 19:08

## 2023-01-11 RX ADMIN — SODIUM CHLORIDE 40 MG: 9 INJECTION, SOLUTION INTRAMUSCULAR; INTRAVENOUS; SUBCUTANEOUS at 10:47

## 2023-01-11 RX ADMIN — ALUMINUM HYDROXIDE, MAGNESIUM HYDROXIDE, AND SIMETHICONE 30 ML: 200; 200; 20 SUSPENSION ORAL at 13:34

## 2023-01-11 RX ADMIN — BUSPIRONE HYDROCHLORIDE 10 MG: 10 TABLET ORAL at 10:46

## 2023-01-11 RX ADMIN — GABAPENTIN 600 MG: 300 CAPSULE ORAL at 10:46

## 2023-01-11 RX ADMIN — SODIUM CHLORIDE, PRESERVATIVE FREE 10 ML: 5 INJECTION INTRAVENOUS at 20:29

## 2023-01-11 ASSESSMENT — PAIN DESCRIPTION - LOCATION
LOCATION: BACK;ABDOMEN
LOCATION: BACK
LOCATION: HEAD

## 2023-01-11 ASSESSMENT — ENCOUNTER SYMPTOMS
CONSTIPATION: 0
FACIAL SWELLING: 0
VOMITING: 1
EYE PAIN: 0
SHORTNESS OF BREATH: 0
EYE ITCHING: 0
DIARRHEA: 0
NAUSEA: 1
BLOOD IN STOOL: 0
ABDOMINAL DISTENTION: 1
ABDOMINAL PAIN: 1
BACK PAIN: 0
CHEST TIGHTNESS: 0
COUGH: 0

## 2023-01-11 ASSESSMENT — PAIN SCALES - GENERAL
PAINLEVEL_OUTOF10: 0
PAINLEVEL_OUTOF10: 6
PAINLEVEL_OUTOF10: 0
PAINLEVEL_OUTOF10: 0
PAINLEVEL_OUTOF10: 1
PAINLEVEL_OUTOF10: 0
PAINLEVEL_OUTOF10: 3

## 2023-01-11 ASSESSMENT — PAIN DESCRIPTION - ONSET
ONSET: GRADUAL
ONSET: ON-GOING

## 2023-01-11 ASSESSMENT — PAIN DESCRIPTION - FREQUENCY
FREQUENCY: INTERMITTENT
FREQUENCY: INTERMITTENT

## 2023-01-11 ASSESSMENT — PAIN DESCRIPTION - ORIENTATION
ORIENTATION: RIGHT;MID
ORIENTATION: MID

## 2023-01-11 ASSESSMENT — PAIN DESCRIPTION - PAIN TYPE: TYPE: ACUTE PAIN

## 2023-01-11 ASSESSMENT — PAIN DESCRIPTION - DESCRIPTORS
DESCRIPTORS: SHARP;CRAMPING
DESCRIPTORS: ACHING

## 2023-01-11 ASSESSMENT — PAIN - FUNCTIONAL ASSESSMENT
PAIN_FUNCTIONAL_ASSESSMENT: ACTIVITIES ARE NOT PREVENTED
PAIN_FUNCTIONAL_ASSESSMENT: PREVENTS OR INTERFERES SOME ACTIVE ACTIVITIES AND ADLS

## 2023-01-11 NOTE — PROGRESS NOTES
This is a non-billable note. Patient admitted by my partner this morning. Had BM this morning. Tramadol for pain.  CLD.   Otherwise assessment and plan per H&P.

## 2023-01-11 NOTE — ED PROVIDER NOTES
Emergency Department Provider Note                   PCP:                Dora Leyden, MD               Age: 60 y.o. Sex: female     No diagnosis found. DISPOSITION          Medical Decision Making  Patient is a 76-year-old female with history of Piero-en-Y gastric bypass surgery presenting via EMS for the complaint of abdominal pain and distention worsening over the last 24 hours. She is afebrile, mildly tachycardic with a heart rate of 114 otherwise vital signs within appropriate limits. Abdomen does appear to be distended on exam with diffuse tenderness to palpation and guarding in the lower and epigastric region. Will obtain labs including CBC, CMP, lipase and lactic acid. Will obtain CT abdomen pelvis with p.o. and IV contrast given history of previous gastric bypass surgery. IV fluids, morphine and Zofran ordered. 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     Labs Reviewed  CBC WITH AUTO DIFFERENTIAL - Abnormal; Notable for the following components:     WBC                           15.3 (*)               Platelets                     684 (*)                MPV                           8.5 (*)                Segs Absolute                 11.5 (*)               Absolute Mono #               1.7 (*)             All other components within normal limits  COMPREHENSIVE METABOLIC PANEL - Abnormal; Notable for the following components:     Potassium                     3.1 (*)                Chloride                      96 (*)                 Anion Gap                     12 (*)                 Glucose                       129 (*)                BUN                           23 (*)                 AST                           14 (*)                 Globulin                      2.5 (*)             All other components within normal limits  LIPASE  LACTIC ACID  URINALYSIS    10:23 PM  CT scan shows: 1. Multiple fluid-filled dilated small bowel loops, with transition point   appearing to be in the right lower quadrant. Findings may be secondary to ileus   or partial small bowel obstruction. 2. Extensive/large volume of fatty stool throughout the colon. 3. No evidence of colitis or diverticulitis. No hydronephrosis. 4. Gallbladder is surgically absent. Biliary ductal dilatation, likely sequela   of cholecystectomy. [facility], Dr. Tomasa Fry for admission for SBO. Did contact general surgery team who do not recommend NG tube at this time given previous surgical history. Plan for transfer for admission discussed with patient and family who verbalized understanding and are agreeable to plan. Amount and/or Complexity of Data Reviewed  External Data Reviewed: radiology and notes. Details: Reviewed history from previous admission/surgery in 2/2022  Labs: ordered. Decision-making details documented in ED Course. Radiology: ordered. Decision-making details documented in ED Course. Risk  Prescription drug management. Decision regarding hospitalization.                                 Orders Placed This Encounter   Procedures    CT ABDOMEN PELVIS W IV CONTRAST Additional Contrast? Oral    CBC with Auto Differential    CMP    Lipase    Lactate, Sepsis    Urinalysis w rflx microscopic    Saline lock IV        Medications   diatrizoate meglumine-sodium (GASTROGRAFIN) 66-10 % solution 15 mL (has no administration in time range)   morphine injection 4 mg (has no administration in time range)   ondansetron (ZOFRAN) injection 4 mg (has no administration in time range)   0.9 % sodium chloride bolus (has no administration in time range)       New Prescriptions    No medications on file        Rashida Gilliam is a 60 y.o. female who presents to the Emergency Department with chief complaint of    Chief Complaint   Patient presents with    Abdominal Pain      Patient is a 25-year-old female with history of Piero-en-Y gastric bypass surgery who presents via EMS with a complaint of abdominal pain and distention since yesterday. She states the pain began while she was at work and she chose to leave early because of the discomfort. Pain is gotten significantly worse over the past 24 hours and she currently rates it as a 9 out of 10. Pain began in the lower abdomen but has gradually intensified throughout the entire abdomen. She states she feels like her abdomen is getting more and more distended and feels like it is pushing up on her lungs. She did have nausea with 2 bouts of clear emesis prior to calling EMS. She states she had a similar episode about a year ago and at the time she was found to have one of her bowels stuck behind her stomach. She had to undergo abdominal surgery and was told that they were able to save the portion of bowel. This was at [facility].  She had Piero-en-Y gastric bypass surgery approximately 7-8 years ago. She denies any fever or chills, chest pain or shortness of breath. She denies any current nausea. Her last bowel movement was 3 days prior however she states it is not unusual for her as she is on opioid medication for her chronic neck and back pain. The history is provided by the patient. Review of Systems   Constitutional:  Negative for activity change, appetite change, chills, fatigue and fever. HENT:  Negative for congestion, ear pain, rhinorrhea and sore throat. Eyes:  Negative for redness. Respiratory:  Negative for cough, chest tightness and shortness of breath. Cardiovascular:  Negative for chest pain. Gastrointestinal:  Positive for abdominal distention, abdominal pain, nausea and vomiting. Negative for constipation, diarrhea and rectal pain. Genitourinary:  Negative for difficulty urinating and dysuria. Musculoskeletal:  Negative for back pain and neck pain. Skin:  Negative for rash. Neurological:  Negative for light-headedness and headaches. Psychiatric/Behavioral:  Negative for confusion.       Past Medical History:   Diagnosis Date    Anxiety     Back problem     Depression     Ex-smoker for less than 1 year     Quit 1/2018  1 ppd x 30 yrs    Family history of anesthesia complication     Patient's father developed an arrhythmia in PACU s/p hip arthroplasty    GERD (gastroesophageal reflux disease)     well controlled with meds    History of bone density study 02/2020    osteoporosis    History of mammogram 08/2020    History of Papanicolaou smear of cervix >5 years aog    Hyperlipidemia     Hypertension     hctz    Nicotine vapor product user     0.9%    Sleep apnea     no CPAP        Past Surgical History:   Procedure Laterality Date    BREAST BIOPSY Left     BREAST SURGERY  2014    CHOLECYSTECTOMY, LAPAROSCOPIC      1999    GASTRIC BYPASS SURGERY  2014    Dr. 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    LUMBAR LAMINECTOMY  03/15/2019    right L4-5 Lami    NEUROLOGICAL SURGERY  03/29/2019    repair of CSF leak right L4-5    LATIA AND BSO (CERVIX REMOVED)      TOTAL ABDOM HYSTERECTOMY          Family History   Problem Relation Age of Onset    Stroke Paternal Grandfather     Stroke Paternal Grandmother     Stroke Maternal Grandfather     Breast Cancer Maternal Grandmother 76    Breast Cancer Mother 79    Hypertension Father     Stroke Father     Hypertension Mother     Stroke Maternal Grandmother         Social History     Socioeconomic History    Marital status:      Spouse name: None    Number of children: None    Years of education: None    Highest education level: None   Tobacco Use    Smoking status: Former     Packs/day: 1.00     Types: Cigarettes     Quit date: 1/1/2018 Years since quittin.0    Smokeless tobacco: Never   Substance and Sexual Activity    Alcohol use: Not Currently    Drug use: Yes     Types: Marijuana (Weed)         Penicillins     Previous Medications    BUPROPION (WELLBUTRIN XL) 150 MG EXTENDED RELEASE TABLET    Take 1 tablet by mouth every morning    BUSPIRONE (BUSPAR) 10 MG TABLET    Take 1 tablet by mouth 3 times daily    BUTALBITAL-ACETAMINOPHEN-CAFFEINE (FIORICET, ESGIC) -40 MG PER TABLET    Take 1 tablet by mouth every 6 hours as needed    DICLOFENAC (CATAFLAM) 50 MG TABLET    TAKE 1 TABLET BY MOUTH TWICE A DAY WITH FOOD OR MILK AS NEEDED FOR 30 DAYS    DULOXETINE (CYMBALTA) 60 MG EXTENDED RELEASE CAPSULE    Take 60 mg by mouth 2 times daily    ERGOCALCIFEROL (ERGOCALCIFEROL) 1.25 MG (88585 UT) CAPSULE    Take 1 capsule by mouth every 7 days    GABAPENTIN (NEURONTIN) 300 MG CAPSULE    TAKE 2 CAPSULES BY MOUTH 3 TIMES A DAY    GABAPENTIN 300 MG/6ML SOLN    2 capsules    HYDROCHLOROTHIAZIDE (HYDRODIURIL) 25 MG TABLET    Take 1 tablet by mouth every morning    HYDROCODONE-ACETAMINOPHEN (NORCO)  MG PER TABLET    TAKE 1 TABLET BY MOUTH EVERY 8 HOURS FOR 30 DAYS    IBANDRONATE (BONIVA) 150 MG TABLET    Take 1 tablet by mouth every 30 days    IBUPROFEN (ADVIL;MOTRIN) 600 MG TABLET    TAKE 1 TABLET BY MOUTH EVERY 6 HOURS AS NEEDED FOR PAIN    MORPHINE (AVINZA) 30 MG EXTENDED RELEASE CAPSULE    Take 1 capsule by oral route every day    MORPHINE (MS CONTIN) 15 MG EXTENDED RELEASE TABLET    TAKE 1 TABLET BY MOUTH EVERY 12 HOURS FOR 30 DAYS    MORPHINE SULFATE ER 15 MG T12A    1 tablet    MULTIPLE VITAMINS-MINERALS (MULTIVITAMIN WOMEN 50+ PO)    Take 1 tablet by mouth daily    OMEPRAZOLE (PRILOSEC) 20 MG DELAYED RELEASE CAPSULE    Take 1 capsule by mouth in the morning. OXYCODONE-ACETAMINOPHEN (PERCOCET)  MG PER TABLET    1 tablet    POTASSIUM CHLORIDE (KLOR-CON M) 10 MEQ EXTENDED RELEASE TABLET    Take 1 tablet by mouth in the morning. ROPINIROLE (REQUIP) 1 MG TABLET    Take 1 tablet by mouth in the morning. SIMVASTATIN (ZOCOR) 10 MG TABLET    Take 1 tablet by mouth in the morning. Vitals signs and nursing note reviewed. Patient Vitals for the past 4 hrs:   Temp Pulse Resp BP SpO2   01/10/23 1923 98.4 °F (36.9 °C) -- -- -- --   01/10/23 1919 -- (!) 115 16 (!) 132/98 94 %          Physical Exam  Vitals and nursing note reviewed. Constitutional:       General: She is in acute distress (pt does appear to be in mild distress 2/2 pain). Appearance: She is not toxic-appearing. HENT:      Head: Normocephalic and atraumatic. Cardiovascular:      Rate and Rhythm: Tachycardia present. Heart sounds: Normal heart sounds. Pulmonary:      Effort: Pulmonary effort is normal.      Breath sounds: Normal breath sounds. Abdominal:      General: A surgical scar is present. There is distension. Tenderness: There is abdominal tenderness. There is guarding. Comments: Ttp throughout the lower abdomen and epigastric region with mild guarding, scar to center abdomen consistent with exploratory laparotomy   Neurological:      Mental Status: She is alert. Procedures    No results found for any visits on 01/10/23. CT ABDOMEN PELVIS W IV CONTRAST Additional Contrast? Oral    (Results Pending)                       Voice dictation software was used during the making of this note. This software is not perfect and grammatical and other typographical errors may be present. This note has not been completely proofread for errors.      Davie Barron [place]  01/10/23 3110

## 2023-01-11 NOTE — CONSULTS
Karen Malone MD   Bariatric & [facility]  [address] [facility]  [place]  Phone [phone]   Fax [phone]      Date of visit: 2023      Primary/Requesting provider: Kwabena Cee MD         Name: Kenny Wong      MRN: [id number]       : 1960       Age: 60 y.o. Sex: female        PCP: Kwabena Cee MD     CC:  No chief complaint on file. HPI:     Kenny Wong is a 60 y.o. female who has past medical history of RYGB done at [facility]. She then underwent dxlap followed by exlap at [facility] last 2022 due to [address] [place] intussusception. She presented to the ER with 2 day history of abdominal distention and pain. She reports pain was 10/10 at worst. Pain was worse with pressure and better with rest. + nausea. Vomited twice. No fever or chills. Since admission last night, she has been passing flatus and had 2 bowel movements.          PMH:    Past Medical History:   Diagnosis Date    Anxiety     Back problem     Depression     Ex-smoker for less than 1 year     Quit 2018  1 ppd x 30 yrs    Family history of anesthesia complication     Patient's father developed an arrhythmia in PACU s/p hip arthroplasty    GERD (gastroesophageal reflux disease)     well controlled with meds    History of bone density study 2020    osteoporosis    History of mammogram 2020    History of Papanicolaou smear of cervix >5 years aog    Hyperlipidemia     Hypertension     hctz    Nicotine vapor product user     0.9%    Sleep apnea     no CPAP       PSH:    Past Surgical History:   Procedure Laterality Date    BREAST BIOPSY Left     BREAST SURGERY  2014    CHOLECYSTECTOMY, LAPAROSCOPIC          GASTRIC BYPASS SURGERY      Dr. 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    LUMBAR LAMINECTOMY  03/15/2019    right L4-5 Lami    NEUROLOGICAL SURGERY  03/29/2019    repair of CSF leak right L4-5    LATIA AND BSO (CERVIX REMOVED)      TOTAL ABDOM HYSTERECTOMY         MEDS:    Current Facility-Administered Medications   Medication Dose Route Frequency Provider Last Rate Last Admin    sodium chloride flush 0.9 % injection 5-40 mL  5-40 mL IntraVENous 2 times per day Tatianna Rose MD   10 mL at 01/11/23 1048    sodium chloride flush 0.9 % injection 5-40 mL  5-40 mL IntraVENous PRN Tatianna Rose MD        0.9 % sodium chloride infusion   IntraVENous PRN Tatianna Rose MD        ondansetron (ZOFRAN-ODT) disintegrating tablet 4 mg  4 mg Oral Q8H PRN Tatianna Rose MD        Or    [facility] injection 4 mg  4 mg IntraVENous Q6H PRN Tatianna Rose MD        aluminum & magnesium hydroxide-simethicone (MAALOX) 697-136-48 MG/5ML suspension 30 mL  30 mL Oral Q6H PRN Tatianna Rose MD   30 mL at 01/11/23 1334    acetaminophen (TYLENOL) tablet 650 mg  650 mg Oral Q6H PRN Tatianna Rose MD   650 mg at 01/11/23 0455    Or    acetaminophen (TYLENOL) suppository 650 mg  650 mg Rectal Q6H PRN Tatianna Rose MD        dextrose 5 % and 0.45 % NaCl with KCl 20 mEq infusion   IntraVENous Continuous Tatianna Rose MD 75 mL/hr at 01/11/23 0449 New Bag at 01/11/23 0449    senna (SENOKOT) tablet 8.6 mg  1 tablet Oral BID Tatianna Rose MD   8.6 mg at 01/11/23 1046    enoxaparin Sodium (LOVENOX) injection 30 mg  30 mg SubCUTAneous Daily Tatianna Rose MD   30 mg at 01/11/23 1047    busPIRone (BUSPAR) tablet 10 mg  10 mg Oral TID Tatianna Rose MD   10 mg at 01/11/23 1333    gabapentin (NEURONTIN) capsule 600 mg  600 mg Oral TID Tatianna Rose MD   600 mg at 01/11/23 1333    HYDROcodone-acetaminophen (NORCO)  MG per tablet 1 tablet  1 tablet Oral Q6H PRN Tatianna Rose MD   1 tablet at 01/11/23 1333    potassium chloride (KLOR-CON M) extended release tablet 10 mEq  10 mEq Oral Daily Tatianna Rose MD   10 mEq at 01/11/23 1046    rOPINIRole (REQUIP) tablet 1 mg  1 mg Oral Nightly Whit Quezada MD        pantoprazole (PROTONIX) 40 mg in sodium chloride (PF) 0.9 % 10 mL injection  40 mg IntraVENous Daily Whit Quezada MD   40 mg at 23 1047    morphine injection 2 mg  2 mg IntraVENous Q4H PRN Whit Quezada MD        bisacodyl (DULCOLAX) suppository 10 mg  10 mg Rectal Daily Joy Meyers MD   10 mg at 23 1048    traMADol (ULTRAM) tablet 25 mg  25 mg Oral Q6H PRN Casper Can MD        Or    traMADol Dejuan Flatter) tablet 50 mg  50 mg Oral Q6H PRN Casper Can MD            ALLERGIES:      Allergies   Allergen Reactions    Penicillins Hives and Swelling       SH:    Social History     Tobacco Use    Smoking status: Former     Packs/day: 1.00     Types: Cigarettes     Quit date: 2018     Years since quittin.0    Smokeless tobacco: Never   Substance Use Topics    Alcohol use: Not Currently    Drug use: Yes     Types: Marijuana Bard [place])       FH:    Family History   Problem Relation Age of Onset    Stroke Paternal Grandfather     Stroke Paternal Grandmother     Stroke Maternal Grandfather     Breast Cancer Maternal Grandmother 76    Breast Cancer Mother 79    Hypertension Father     Stroke Father     Hypertension Mother     Stroke Maternal Grandmother        Review of systems:  Review of Systems   Constitutional:  Negative for chills, fatigue, fever and unexpected weight change. HENT:  Negative for ear discharge, ear pain and facial swelling. Eyes:  Negative for pain and itching. Respiratory:  Negative for cough, chest tightness and shortness of breath. Cardiovascular:  Negative for chest pain. Gastrointestinal:  Positive for abdominal distention, abdominal pain, nausea and vomiting. Negative for blood in stool, constipation and diarrhea. Genitourinary:  Negative for difficulty urinating, dysuria and hematuria. Musculoskeletal:  Negative for back pain, gait problem and neck pain. Skin:  Negative for rash and wound.    Neurological:  Negative for facial asymmetry, speech difficulty and weakness. Hematological:  Does not bruise/bleed easily. Psychiatric/Behavioral:  Negative for agitation, decreased concentration and sleep disturbance. Physical Exam:     /66   Pulse 73   Temp 98.6 °F (37 °C)   Resp 16   Ht 5' 2\" (1.575 m)   Wt 105 lb 14.4 oz (48 kg)   SpO2 95%   BMI 19.37 kg/m²     General:  Well-developed, well-nourished, no distress. Psych:  Cooperative, good insight and judgement. Neuro:  Alert, oriented to person, place and time. HEENT:  Normocephalic, atraumatic. Sclera clear. Lungs:  Unlabored breathing. Symmetrical chest expansion. Chest wall:  No tenderness or deformity. Heart:  Regular rate and rhythm. No JVD. Abdomen:  Soft, mild lower abdominal distention and mild tenderness, non-distended. No guarding or rebound. Extremities:  Extremities normal, atraumatic, no cyanosis or edema. Skin:  Skin color, texture, turgor normal. No rashes. Labs: All recent labs were reviewed. WBC elevated initially but now normalized.      Recent Results (from the past 24 hour(s))   CBC with Auto Differential    Collection Time: 01/10/23  7:35 PM   Result Value Ref Range    WBC 15.3 (H) 4.3 - 11.1 K/uL    RBC 4.60 4.05 - 5.20 M/uL    Hemoglobin 12.5 11.7 - 15.4 g/dL    Hematocrit 38.6 35.8 - 46.3 %    MCV 83.9 82.0 - 102.0 FL    MCH 27.2 26.1 - 32.9 PG    MCHC 32.4 31.4 - 35.0 g/dL    RDW 13.5 11.9 - 14.6 %    Platelets 217 (H) 205 - 450 K/uL    MPV 8.5 (L) 9.4 - 12.3 FL    nRBC 0.00 0.0 - 0.2 K/uL    Differential Type AUTOMATED      Seg Neutrophils 75 43 - 78 %    Lymphocytes 13 13 - 44 %    Monocytes 11 4.0 - 12.0 %    Eosinophils % 1 0.5 - 7.8 %    Basophils 1 0.0 - 2.0 %    Immature Granulocytes 0 0.0 - 5.0 %    Segs Absolute 11.5 (H) 1.7 - 8.2 K/UL    Absolute Lymph # 1.9 0.5 - 4.6 K/UL    Absolute Mono # 1.7 (H) 0.1 - 1.3 K/UL    Absolute Eos # 0.1 0.0 - 0.8 K/UL    Basophils Absolute 0.1 0.0 - 0.2 K/UL    Absolute Immature Granulocyte 0.1 0.0 - 0.5 K/UL   CMP    Collection Time: 01/10/23  7:35 PM   Result Value Ref Range    Sodium 139 133 - 143 mmol/L    Potassium 3.1 (L) 3.5 - 5.1 mmol/L    Chloride 96 (L) 98 - 107 mmol/L    CO2 31 21 - 32 mmol/L    Anion Gap 12 (H) 2 - 11 mmol/L    Glucose 129 (H) 65 - 100 mg/dL    BUN 23 (H) 7.0 - 18.0 MG/DL    Creatinine 0.70 0.6 - 1.0 MG/DL    Est, Glom Filt Rate >60 >60 ml/min/1.73m2    Calcium 9.2 8.3 - 10.4 MG/DL    Total Bilirubin 0.3 0.2 - 1.1 MG/DL    ALT 13 13.0 - 61.0 U/L    AST 14 (L) 15 - 37 U/L    Alk Phosphatase 57 45.0 - 117.0 U/L    Total Protein 6.4 6.4 - 8.2 g/dL    Albumin 3.9 3.2 - 4.6 g/dL    Globulin 2.5 (L) 2.8 - 4.5 g/dL    Albumin/Globulin Ratio 1.6 0.4 - 1.6     Lipase    Collection Time: 01/10/23  7:35 PM   Result Value Ref Range    Lipase 19 13 - 60 U/L   Lactic Acid    Collection Time: 01/10/23  7:35 PM   Result Value Ref Range    Lactic Acid 1.60 0.4 - 2.0 mmol/L   POCT Glucose    Collection Time: 01/11/23  7:11 AM   Result Value Ref Range    POC Glucose 110 (H) 65 - 100 mg/dL    Performed by: Musa    CBC with Auto Differential    Collection Time: 01/11/23 10:34 AM   Result Value Ref Range    WBC 7.8 4.3 - 11.1 K/uL    RBC 3.76 (L) 4.05 - 5.2 M/uL    Hemoglobin 10.3 (L) 11.7 - 15.4 g/dL    Hematocrit 32.8 (L) 35.8 - 46.3 %    MCV 87.2 82 - 102 FL    MCH 27.4 26.1 - 32.9 PG    MCHC 31.4 31.4 - 35.0 g/dL    RDW 13.5 11.9 - 14.6 %    Platelets 445 (H) 754 - 450 K/uL    MPV 8.9 (L) 9.4 - 12.3 FL    nRBC 0.00 0.0 - 0.2 K/uL    Differential Type AUTOMATED      Seg Neutrophils 65 43 - 78 %    Lymphocytes 22 13 - 44 %    Monocytes 11 4.0 - 12.0 %    Eosinophils % 1 0.5 - 7.8 %    Basophils 1 0.0 - 2.0 %    Immature Granulocytes 0 0.0 - 5.0 %    Segs Absolute 5.1 1.7 - 8.2 K/UL    Absolute Lymph # 1.7 0.5 - 4.6 K/UL    Absolute Mono # 0.9 0.1 - 1.3 K/UL    Absolute Eos # 0.1 0.0 - 0.8 K/UL    Basophils Absolute 0.1 0.0 - 0.2 K/UL    Absolute Immature Granulocyte 0.0 0.0 - 0.5 K/UL   Lactic Acid    Collection Time: 01/11/23 10:34 AM   Result Value Ref Range    Lactic Acid, Plasma 1.0 0.4 - 2.0 MMOL/L   Basic Metabolic Panel    Collection Time: 01/11/23 10:34 AM   Result Value Ref Range    Sodium 140 133 - 143 mmol/L    Potassium 3.1 (L) 3.5 - 5.1 mmol/L    Chloride 102 101 - 110 mmol/L    CO2 32 21 - 32 mmol/L    Anion Gap 6 2 - 11 mmol/L    Glucose 105 (H) 65 - 100 mg/dL    BUN 14 8 - 23 MG/DL    Creatinine 0.70 0.6 - 1.0 MG/DL    Est, Glom Filt Rate >60 >60 ml/min/1.73m2    Calcium 8.3 8.3 - 10.4 MG/DL       Imaging: CT images were independently reviewed by me. See A/p. XR ABDOMEN (KUB) (SINGLE AP VIEW)  Narrative: ABDOMINAL FILMS, 1 view(s). INDICATION: Dilated small bowel loops, follow-up. TECHNIQUE:  Supine views of the abdomen on 2 image(s). COMPARISON: CT scan from last night. Impression: FINDINGS / IMPRESSION: Small bowel loops are distinctly less dilated than  before. Overall there is less bowel gas. Moderate gas in the rectum. Surgical  changes upper abdomen. Atelectasis the bases       No diagnosis found. Assessment/Plan:  Lizzy Leblanc is a 60 y.o. female who has signs and symptoms consistent with pSBO    Patient with complex surgical history, now with pSBO. She is passing flatus and having BM. CT scan images personally reviewed. Thre is some thickening in terminal ileum area but I do not see an obvious obstruction. She has a large amount of stool burden in the colon. We ordered suppositories today and she has responded well. Bms could come from stool in colon. I think we will need to do a SBFT with gastrografin tomorrow to rule out obstruction. She is definitely less distended and feeling better now. We will monitor closely. Keep NPO, IVFs until SBFT is completed tomorrow.        Time: I spent 70 minutes preparing to see patient (including chart review and preparation), obtaining and/or reviewing additional medical history, performing a physical exam and evaluation, documenting clinical information in the electronic health record, independently interpreting results, communicating results to patient, family or caregiver, and/or coordinating care.       Signed: Jason Bazan MD  Bariatric & Minimally Invasive Surgery  1/11/2023 4:19 PM

## 2023-01-11 NOTE — ED TRIAGE NOTES
Pt presents to the ED for c/o upper abd pain that began yesterday.   States that she has had a hx of bowel perforation last year and symptoms feel the same

## 2023-01-11 NOTE — H&P
Hospitalist History and Physical   Admit Date:  2023  2:47 AM   Name:  Shayan Smith   Age:  60 y.o. Sex:  female  :  1960   MRN:  [id number]   Room:  310/01    Presenting Complaint: Abd pain, N/V   Reason(s) for Admission: SBO (small bowel obstruction) ([facility]) [K56.609]       Assessment & Plan:     Principal Problem:    Small bowel obstruction ([facility]) - 61 yo F with previous Piero en Y bypass surgery presenting wtih 24 hours of worsening abd pain/distension associated with n/v, work-up concerning for partial small bowel obstruction versus ileus. She has no active vomiting in ER, so general surgery did not recommend NG tube placement. Plan:   Admit to regular medical bed, inpatient status. For her small bowel obstruction, we will keep her n.p.o. with ice chips. IV fluids to avoid dehydration. Formal general surgery consult in a.m. Resume home medications. Add bowel regimen as she also has evidence of constipation on CT scan. DVT prophylaxis with Lovenox. Active Problems:    Hypokalemia  Plan:     Chronic constipation  Plan:     SBO (small bowel obstruction) ([facility])  Plan:     History of gastric bypass  Plan:     Mixed hyperlipidemia  Plan:     Essential hypertension  Plan:       Anticipated discharge needs:     NONE    Diet: regular  VTE ppx: lovenox  Code status: FULL      History of Present Illness:   Shayan Smith is a 60 y.o. female with medical history of Piero-en-Y gastric bypass surgery presenting via EMS for the complaint of abdominal pain and distention worsening over the last 24 hours. She is afebrile, mildly tachycardic with a heart rate of 114 otherwise vital signs within appropriate limits. Abdomen does appear to be distended on exam with diffuse tenderness to palpation and guarding in the lower and epigastric region.      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 CT scan abdomen and pelvis shows multiple fluid-filled dilated small bowel loops with transition point in the right lower quadrant. Things concerning for ileus or partial small bowel obstruction. She also has a large amount of fatty stool throughout colon. Given above findings hospitalist consulted for admission. ER PA did contact general surgery who did not recommend NG tube placement at this time due to previous surgical history as well as lack of vomiting in ER. Review of Systems:  10 systems reviewed and negative except as noted in HPI.     Past History:     Past Medical History:   Diagnosis Date    Anxiety     Back problem     Depression     Ex-smoker for less than 1 year     Quit 2018  1 ppd x 30 yrs    Family history of anesthesia complication     Patient's father developed an arrhythmia in PACU s/p hip arthroplasty    GERD (gastroesophageal reflux disease)     well controlled with meds    History of bone density study 2020    osteoporosis    History of mammogram 2020    History of Papanicolaou smear of cervix >5 years aog    Hyperlipidemia     Hypertension     hctz    Nicotine vapor product user     0.9%    Sleep apnea     no CPAP       Past Surgical History:   Procedure Laterality Date    BREAST BIOPSY Left     BREAST SURGERY      CHOLECYSTECTOMY, LAPAROSCOPIC          GASTRIC BYPASS SURGERY      Dr. 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    LUMBAR LAMINECTOMY  03/15/2019    right L4-5 Lami    NEUROLOGICAL SURGERY  2019    repair of CSF leak right L4-5    LATIA AND BSO (CERVIX REMOVED)      TOTAL ABDOM HYSTERECTOMY          Social History     Tobacco Use    Smoking status: Former     Packs/day: 1.00     Types: Cigarettes     Quit date: 2018     Years since quittin.0    Smokeless tobacco: Never   Substance Use Topics Alcohol use: Not Currently      Social History     Substance and Sexual Activity   Drug Use Yes    Types: Marijuana [place])       Family History   Problem Relation Age of Onset    Stroke Paternal Grandfather     Stroke Paternal Grandmother     Stroke Maternal Grandfather     Breast Cancer Maternal Grandmother 76    Breast Cancer Mother 79    Hypertension Father     Stroke Father     Hypertension Mother     Stroke Maternal Grandmother         Immunization History   Administered Date(s) Administered    COVID-19, PFIZER PURPLE top, DILUTE for use, (age 15 y+), 30mcg/0.3mL 09/07/2021, 09/07/2021, 10/12/2021    Influenza Virus Vaccine 11/01/2018    Influenza, FLUBLOK, (age 25 y+), PF, 0.5mL 02/19/2022    Influenza, FLUCELVAX, (age 10 mo+), MDCK, PF, 0.5mL 10/24/2022     Allergies   Allergen Reactions    Penicillins Hives and Swelling     Prior to Admit Medications:  Current Outpatient Medications   Medication Instructions    busPIRone (BUSPAR) 10 mg, Oral, 3 TIMES DAILY    butalbital-acetaminophen-caffeine (FIORICET, ESGIC) -40 MG per tablet 1 tablet, Oral, EVERY 6 HOURS PRN    diclofenac (CATAFLAM) 50 MG tablet TAKE 1 TABLET BY MOUTH TWICE A DAY WITH FOOD OR MILK AS NEEDED FOR 30 DAYS    ergocalciferol (ERGOCALCIFEROL) 50,000 Units, Oral, EVERY 7 DAYS    gabapentin (NEURONTIN) 300 MG capsule TAKE 2 CAPSULES BY MOUTH 3 TIMES A DAY    hydroCHLOROthiazide (HYDRODIURIL) 25 mg, Oral, EVERY MORNING    HYDROcodone-acetaminophen (NORCO)  MG per tablet TAKE 1 TABLET BY MOUTH EVERY 8 HOURS FOR 30 DAYS    ibandronate (BONIVA) 150 mg, Oral, EVERY 30 DAYS    ibuprofen (ADVIL;MOTRIN) 600 MG tablet TAKE 1 TABLET BY MOUTH EVERY 6 HOURS AS NEEDED FOR PAIN    morphine (AVINZA) 30 MG extended release capsule Take 1 capsule by oral route every day    Multiple Vitamins-Minerals (MULTIVITAMIN WOMEN 50+ PO) 1 tablet, Oral, DAILY    omeprazole (PRILOSEC) 20 mg, Oral, DAILY    potassium chloride (KLOR-CON M) 10 MEQ extended release tablet 10 mEq, Oral, DAILY    rOPINIRole (REQUIP) 1 mg, Oral, DAILY         Objective:   Patient Vitals for the past 24 hrs:   Temp Pulse Resp BP SpO2   01/11/23 0230 98.3 °F (36.8 °C) 89 16 105/72 93 %       Oxygen Therapy  SpO2: 93 %  O2 Device: None (Room air)    Estimated body mass index is 19.37 kg/m² as calculated from the following:    Height as of this encounter: 5' 2\" (1.575 m). Weight as of this encounter: 105 lb 14.4 oz (48 kg). Intake/Output Summary (Last 24 hours) at 1/11/2023 0353  Last data filed at 1/11/2023 0250  Gross per 24 hour   Intake 0 ml   Output 0 ml   Net 0 ml         Physical Exam:    Blood pressure 105/72, pulse 89, temperature 98.3 °F (36.8 °C), temperature source Oral, resp. rate 16, height 5' 2\" (1.575 m), weight 105 lb 14.4 oz (48 kg), SpO2 93 %. General:    Well nourished. Alert and oriented x3, no acute distress  Head:  Normocephalic, atraumatic  Eyes:  Sclerae appear normal.  Pupils equally round. ENT:  Nares appear normal, no drainage. Moist oral mucosa  Neck:  No restricted ROM. Trachea midline   CV:   RRR. No m/r/g. No jugular venous distension. Lungs:   CTAB. No wheezing, rhonchi, or rales. Symmetric expansion. Abdomen: Bowel sounds present. Soft, diffuse tender, nondistended. No guarding. Extremities: No cyanosis or clubbing. No edema  Skin:     No rashes and normal coloration. Warm and dry. Neuro:  CN II-XII grossly intact. Sensation intact. A&Ox3  Psych:  Normal mood and affect.       I have personally reviewed labs and tests showing:  Recent Labs:  Recent Results (from the past 24 hour(s))   CBC with Auto Differential    Collection Time: 01/10/23  7:35 PM   Result Value Ref Range    WBC 15.3 (H) 4.3 - 11.1 K/uL    RBC 4.60 4.05 - 5.20 M/uL    Hemoglobin 12.5 11.7 - 15.4 g/dL    Hematocrit 38.6 35.8 - 46.3 %    MCV 83.9 82.0 - 102.0 FL    MCH 27.2 26.1 - 32.9 PG    MCHC 32.4 31.4 - 35.0 g/dL    RDW 13.5 11.9 - 14.6 %    Platelets 162 (H) 759 - 450 K/uL    MPV 8.5 (L) 9.4 - 12.3 FL    nRBC 0.00 0.0 - 0.2 K/uL    Differential Type AUTOMATED      Seg Neutrophils 75 43 - 78 %    Lymphocytes 13 13 - 44 %    Monocytes 11 4.0 - 12.0 %    Eosinophils % 1 0.5 - 7.8 %    Basophils 1 0.0 - 2.0 %    Immature Granulocytes 0 0.0 - 5.0 %    Segs Absolute 11.5 (H) 1.7 - 8.2 K/UL    Absolute Lymph # 1.9 0.5 - 4.6 K/UL    Absolute Mono # 1.7 (H) 0.1 - 1.3 K/UL    Absolute Eos # 0.1 0.0 - 0.8 K/UL    Basophils Absolute 0.1 0.0 - 0.2 K/UL    Absolute Immature Granulocyte 0.1 0.0 - 0.5 K/UL   CMP    Collection Time: 01/10/23  7:35 PM   Result Value Ref Range    Sodium 139 133 - 143 mmol/L    Potassium 3.1 (L) 3.5 - 5.1 mmol/L    Chloride 96 (L) 98 - 107 mmol/L    CO2 31 21 - 32 mmol/L    Anion Gap 12 (H) 2 - 11 mmol/L    Glucose 129 (H) 65 - 100 mg/dL    BUN 23 (H) 7.0 - 18.0 MG/DL    Creatinine 0.70 0.6 - 1.0 MG/DL    Est, Glom Filt Rate >60 >60 ml/min/1.73m2    Calcium 9.2 8.3 - 10.4 MG/DL    Total Bilirubin 0.3 0.2 - 1.1 MG/DL    ALT 13 13.0 - 61.0 U/L    AST 14 (L) 15 - 37 U/L    Alk Phosphatase 57 45.0 - 117.0 U/L    Total Protein 6.4 6.4 - 8.2 g/dL    Albumin 3.9 3.2 - 4.6 g/dL    Globulin 2.5 (L) 2.8 - 4.5 g/dL    Albumin/Globulin Ratio 1.6 0.4 - 1.6     Lipase    Collection Time: 01/10/23  7:35 PM   Result Value Ref Range    Lipase 19 13 - 60 U/L   Lactic Acid    Collection Time: 01/10/23  7:35 PM   Result Value Ref Range    Lactic Acid 1.60 0.4 - 2.0 mmol/L       I have personally reviewed imaging studies showing:  CT ABDOMEN PELVIS W IV CONTRAST Additional Contrast? Oral    Result Date: 1/10/2023  CT abdomen and pelvis with contrast COMPARISON: None. INDICATION: Abdominal pain and distention. History of gastric bypass and cholecystectomy. TECHNIQUE: CT imaging was performed of the abdomen and pelvis following the uncomplicated administration of intravenous contrast (Isovue 370, 100 mL). Oral contrast was administered.  Radiation dose reduction techniques were used for this study:  Our CT scanners use one or all of the following: Automated exposure control, adjustment of the mA and/or kVp according to patient's size, iterative reconstruction. FINDINGS: Mild subsegmental atelectasis in the visualized lung bases. Bilateral breast implants are partially seen. Abdomen findings: Gallbladder is surgically absent. There is biliary ductal dilatation. There is no liver lesion. The pancreas, spleen, adrenal glands, and the right kidney are unremarkable. A small left renal cyst is noted. No hydronephrosis. Abdominal aorta is normal in course and caliber with some atherosclerotic calcifications. No evidence of lymphadenopathy. There are findings of prior gastric bypass surgery. Pelvic findings: There are multiple fluid-filled dilated small bowel loops. Transition point appears to be in the right lower quadrant (series 601, image 43). No associated mass lesion. There is large stool volume throughout the colon. Colon is normal in course. Appendix is not positively identified. Urinary bladder is normal in contour. There is no free air or free fluid. Surrounding bones are intact. Old/chronic compression deformity of T12.     1. Multiple fluid-filled dilated small bowel loops, with transition point appearing to be in the right lower quadrant. Findings may be secondary to ileus or partial small bowel obstruction. 2. Extensive/large volume of fatty stool throughout the colon. 3. No evidence of colitis or diverticulitis. No hydronephrosis. 4. Gallbladder is surgically absent. Biliary ductal dilatation, likely sequela of cholecystectomy. Echocardiogram:  No results found for this or any previous visit.         Orders Placed This Encounter   Medications    sodium chloride flush 0.9 % injection 5-40 mL    sodium chloride flush 0.9 % injection 5-40 mL    0.9 % sodium chloride infusion    OR Linked Order Group     ondansetron (ZOFRAN-ODT) disintegrating tablet 4 mg     ondansetron (ZOFRAN) injection 4 mg    aluminum & magnesium hydroxide-simethicone (MAALOX) 200-200-20 MG/5ML suspension 30 mL    OR Linked Order Group     acetaminophen (TYLENOL) tablet 650 mg     acetaminophen (TYLENOL) suppository 650 mg    dextrose 5 % and 0.45 % NaCl with KCl 20 mEq infusion    senna (SENOKOT) tablet 8.6 mg    enoxaparin Sodium (LOVENOX) injection 30 mg     Order Specific Question:   Indication of Use     Answer:   Prophylaxis-DVT/PE    busPIRone (BUSPAR) tablet 10 mg    gabapentin (NEURONTIN) capsule 600 mg    HYDROcodone-acetaminophen (NORCO)  MG per tablet 1 tablet    potassium chloride (KLOR-CON M) extended release tablet 10 mEq    rOPINIRole (REQUIP) tablet 1 mg    pantoprazole (PROTONIX) 40 mg in sodium chloride (PF) 0.9 % 10 mL injection    morphine injection 2 mg         Signed:  Oscar Leyva MD    Part of this note may have been written by using a voice dictation software. The note has been proof read but may still contain some grammatical/other typographical errors.

## 2023-01-11 NOTE — CARE COORDINATION
Pt admitted for worsening abd pain/distension with N/V. Admitted for SBO. Pt lives with SO in a mobile home. PTA, indep with her ADLs. Works [employer]. Drives. Denies DME need. On RA. Denies current home care services. PCP confirmed. Insurance verified and able to afford home meds. Will remain available for potential dc needs. 01/11/23 1212   Service Assessment   Patient Orientation Alert and Oriented   Cognition Alert   History Provided By Patient   Primary Caregiver Self   Support Systems Spouse/Significant Other;Children;[employer]/[place] [employer];Friends/Neighbors   PCP Verified by CM Yes  Soraya Lopez)   Prior Functional Level Independent in ADLs/IADLs   Current Functional Level Independent in ADLs/IADLs   Can patient return to prior living arrangement Yes   Ability to make needs known: Good   Family able to assist with home care needs: Yes   Would you like for me to discuss the discharge plan with any other family members/significant others, and if so, who? Yes   Community Resources None   Social/Functional History   Lives With Significant other   Type of [place] [address] One level   ADL Assistance Independent   Ambulation Assistance Independent   Active  Yes   Mode of Transportation Car   Occupation Full time employment   Discharge Planning   Type of Residence Trailer/Mobile Home   Living Arrangements Spouse/Significant Other   Current Services Prior To Admission None   Potential Assistance Needed N/A   DME Ordered? No   Potential Assistance Purchasing Medications No   Type of Home Care Services None   Patient expects to be discharged to: [facility] Discharge   Transition of Care Consult (CM Consult) Discharge [facility] Discharge None   [facility] Information Provided?  Yes   Mode of Transport at Discharge Other (see comment)  (Family)   Confirm Follow Up Transport Self

## 2023-01-11 NOTE — ED NOTES
TRANSFER - OUT REPORT:    Verbal report given to Chidi Leon RN on Anuradha Billow  being transferred to [facility] for routine progression of patient care       Report consisted of patient's Situation, Background, Assessment and   Recommendations(SBAR). Information from the following report(s) Nurse Handoff Report, ED SBAR, [facility], and Recent Results was reviewed with the receiving nurse. Lines:   Peripheral IV 01/10/23 Right Forearm (Active)   Site Assessment Clean, dry & intact 01/10/23 1933   Line Status Specimen collected;Normal saline locked; Flushed;Blood return noted 01/10/23 1933        Opportunity for questions and clarification was provided.       Patient transported with:  Kristi Stapleton RN  01/11/23 9345

## 2023-01-11 NOTE — CONSULTS
H&P/Consult Note/Progress Note/Office Note:   Alli Ford  MRN: [id number]  AQL:9/05/4079  Age:62 y.o.    HPI: Alli Ford is a 60 y.o. female who we are asked by IM to see for SBO vs Ileus in the setting of prior Piero-en-Y gastric bypass (Dr Josh Masterson 2014). The patient has a PMHx of TRUPTI, HTN, DDD. She presented with a near 24 hr history of acute onset of nausea, vomiting, with abdominal pain. ER work up with CT as below was reviewed and demonstrates SBO/Ileus with fatty stool in throughout the colon. She was admitted on 1/11/2023 for Abdominal pain with N/V.       PRIMSA Historical information obtained from [address] [place] [address] in Epic:  02/18/22, underwent emergent diagnostic laparoscopy, exploratory laparotomy with reduction of jejunojejunal intussusception and bowel pexy. Surgeon- Dr. Lex Eisenmenger    Laparoscopic Piero-en-Y gastric bypass by Dr. Brandon Renee approximately 6 to 7 years ago~ 2014    The patient has never had a colonoscopy for screening, she is 57years old. The patient states that she plans to undergo initial testing with Cologuard through her PCP. She does not follow with a gastroenterologist regularly    The patient details last [facility] Sunday 1/8/23. She was feeling hungry Monday AM and ate a breakfast burrito at home and was still hungry and ate part of a breakfast egg bowl. She went to work and started with acute on set of Abdominal pain with cramping. She left work. Stayed at home until last evening when the pain progressed and she vomited. She was concerned as symptoms were similar to her last year incident of intussusception that required surgery. Of note, the patient is treated by Chronic Pain management for spine pain and is maintained on Norco 10 mg and Ibuprofen, and Neurontin daily    Since admission she has passed flatus, her abdominal bloating has resolved and her abdomin pain is mostly Right sided tenderness.   She overall feels 'much better.'      Abdominal surgery history: 02/18/22, emergent diagnostic laparoscopy, exploratory laparotomy with reduction of jejunojejunal intussusception and bowel pexy. Surgeon- Dr. Gamaliel Lemus    Laparoscopic Piero-en-Y gastric bypass by Dr. Jenna Orozco approximately 6 to 7 years ago~ 2014    Ectopic surgical intervention x 2 (1970's) with low transverse incisions.     Last BM: normal 1/8/23  Anticoagulation: none   Colonoscopy: never    Past Medical History:   Diagnosis Date    Anxiety     Back problem     Depression     Ex-smoker for less than 1 year     Quit 1/2018  1 ppd x 30 yrs    Family history of anesthesia complication     Patient's father developed an arrhythmia in PACU s/p hip arthroplasty    GERD (gastroesophageal reflux disease)     well controlled with meds    History of bone density study 02/2020    osteoporosis    History of mammogram 08/2020    History of Papanicolaou smear of cervix >5 years aog    Hyperlipidemia     Hypertension     hctz    Nicotine vapor product user     0.9%    Sleep apnea     no CPAP     Past Surgical History:   Procedure Laterality Date    BREAST BIOPSY Left     BREAST SURGERY  2014    CHOLECYSTECTOMY, LAPAROSCOPIC      1999    GASTRIC BYPASS SURGERY  2014    Dr. 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    LUMBAR LAMINECTOMY  03/15/2019    right L4-5 Lami    NEUROLOGICAL SURGERY  03/29/2019    repair of CSF leak right L4-5    LATIA AND BSO (CERVIX REMOVED)      TOTAL ABDOM HYSTERECTOMY       Current Facility-Administered Medications   Medication Dose Route Frequency    sodium chloride flush 0.9 % injection 5-40 mL  5-40 mL IntraVENous 2 times per day    sodium chloride flush 0.9 % injection 5-40 mL  5-40 mL IntraVENous PRN    0.9 % sodium chloride infusion   IntraVENous PRN    ondansetron (ZOFRAN-ODT) disintegrating tablet 4 mg  4 mg Oral Q8H PRN    Or    ondansetron (ZOFRAN) injection 4 mg  4 mg IntraVENous Q6H PRN    aluminum & magnesium hydroxide-simethicone (MAALOX) 109-536-48 MG/5ML suspension 30 mL  30 mL Oral Q6H PRN    acetaminophen (TYLENOL) tablet 650 mg  650 mg Oral Q6H PRN    Or    acetaminophen (TYLENOL) suppository 650 mg  650 mg Rectal Q6H PRN    dextrose 5 % and 0.45 % NaCl with KCl 20 mEq infusion   IntraVENous Continuous    senna (SENOKOT) tablet 8.6 mg  1 tablet Oral BID    enoxaparin Sodium (LOVENOX) injection 30 mg  30 mg SubCUTAneous Daily    busPIRone (BUSPAR) tablet 10 mg  10 mg Oral TID    gabapentin (NEURONTIN) capsule 600 mg  600 mg Oral TID    HYDROcodone-acetaminophen (NORCO)  MG per tablet 1 tablet  1 tablet Oral Q6H PRN    potassium chloride (KLOR-CON M) extended release tablet 10 mEq  10 mEq Oral Daily    rOPINIRole (REQUIP) tablet 1 mg  1 mg Oral Nightly    pantoprazole (PROTONIX) 40 mg in sodium chloride (PF) 0.9 % 10 mL injection  40 mg IntraVENous Daily    morphine injection 2 mg  2 mg IntraVENous Q4H PRN    bisacodyl (DULCOLAX) suppository 10 mg  10 mg Rectal Daily     Penicillins  Social History     Socioeconomic History    Marital status:    Tobacco Use    Smoking status: Former     Packs/day: 1.00     Types: Cigarettes     Quit date: 2018     Years since quittin.0    Smokeless tobacco: Never   Substance and Sexual Activity    Alcohol use: Not Currently    Drug use: Yes     Types: Marijuana (Weed)     Social History     Tobacco Use   Smoking Status Former    Packs/day: 1.00    Types: Cigarettes    Quit date: 2018    Years since quittin.0   Smokeless Tobacco Never     Family History   Problem Relation Age of Onset    Stroke Paternal Grandfather     Stroke Paternal Grandmother     Stroke Maternal Grandfather     Breast Cancer Maternal Grandmother 76    Breast Cancer Mother 79    Hypertension Father     Stroke Father     Hypertension Mother     Stroke Maternal Grandmother      ROS: The patient has no difficulty with chest pain or shortness of breath. No fever or chills.   Comprehensive review of systems was otherwise unremarkable except as noted above. Physical Exam:   /78   Pulse 92   Temp 98.2 °F (36.8 °C) (Oral)   Resp 16   Ht 5' 2\" (1.575 m)   Wt 105 lb 14.4 oz (48 kg)   SpO2 94%   BMI 19.37 kg/m²   Constitutional: Alert, oriented, cooperative patient in no acute distress; appears stated age    Eyes:Sclera are clear. EOMs intact  ENMT: no external lesions gross hearing normal; no obvious neck masses, no ear or lip lesions, nares normal  CV: RRR. Normal perfusion  Resp: No JVD. Breathing is  non-labored; no audible wheezing. GI: soft and non-distended with right sided tenderness without guarding or peritoneal signs. Well healed low transverse scar and periumbilical scar well healed. Musculoskeletal: unremarkable with normal function. No embolic signs or cyanosis. Neuro:  Oriented; moves all 4; no focal deficits  Psychiatric: normal affect and mood, no memory impairment    Recent vitals (if inpt):  Patient Vitals for the past 24 hrs:   BP Temp Temp src Pulse Resp SpO2 Height Weight   01/11/23 0435 108/78 98.2 °F (36.8 °C) Oral 92 16 94 % -- --   01/11/23 0230 105/72 98.3 °F (36.8 °C) Oral 89 16 93 % 5' 2\" (1.575 m) 105 lb 14.4 oz (48 kg)       Labs:  Recent Labs     01/10/23  1935   WBC 15.3*   HGB 12.5   *      K 3.1*   CL 96*   CO2 31   BUN 23*   ALT 13       Lab Results   Component Value Date/Time    WBC 15.3 01/10/2023 07:35 PM    HGB 12.5 01/10/2023 07:35 PM     01/10/2023 07:35 PM     01/10/2023 07:35 PM    K 3.1 01/10/2023 07:35 PM    CL 96 01/10/2023 07:35 PM    CO2 31 01/10/2023 07:35 PM    BUN 23 01/10/2023 07:35 PM    ALT 13 01/10/2023 07:35 PM       I reviewed recent labs and recent radiologic studies. I independently reviewed radiology images for studies I described above or studies I have ordered.        Xray Result (most recent):  XR KNEE LEFT (3 VIEWS) 11/11/2021    Narrative  Left knee radiographs, 3 views    INDICATION: Pain after fall    COMPARISON: None. FINDINGS:  No acute fractures evident. Alignment is normal. Mild osteoarthritic changes. No  joint effusion. Unremarkable soft tissues. Impression  No acute radiographic abnormality. CT Result (most recent):  CT ABDOMEN PELVIS W IV CONTRAST 01/10/2023    Narrative  CT abdomen and pelvis with contrast    COMPARISON: None. INDICATION: Abdominal pain and distention. History of gastric bypass and  cholecystectomy. TECHNIQUE: CT imaging was performed of the abdomen and pelvis following the  uncomplicated administration of intravenous contrast (Isovue 370, 100 mL). Oral  contrast was administered. Radiation dose reduction techniques were used for  this study:  Our CT scanners use one or all of the following: Automated exposure  control, adjustment of the mA and/or kVp according to patient's size, iterative  reconstruction. FINDINGS:    Mild subsegmental atelectasis in the visualized lung bases. Bilateral breast implants are partially seen. Abdomen findings:    Gallbladder is surgically absent. There is biliary ductal dilatation. There is  no liver lesion. The pancreas, spleen, adrenal glands, and the right kidney are unremarkable. A  small left renal cyst is noted. No hydronephrosis. Abdominal aorta is normal in course and caliber with some atherosclerotic  calcifications. No evidence of lymphadenopathy. There are findings of prior gastric bypass surgery. Pelvic findings: There are multiple fluid-filled dilated small bowel loops. Transition point  appears to be in the right lower quadrant (series 601, image 43). No associated  mass lesion. There is large stool volume throughout the colon. Colon is normal  in course. Appendix is not positively identified. Urinary bladder is normal in  contour. There is no free air or free fluid. Surrounding bones are intact. Old/chronic  compression deformity of T12. Impression  1.  Multiple fluid-filled dilated small bowel loops, with transition point  appearing to be in the right lower quadrant. Findings may be secondary to ileus  or partial small bowel obstruction. 2. Extensive/large volume of fatty stool throughout the colon. 3. No evidence of colitis or diverticulitis. No hydronephrosis. 4. Gallbladder is surgically absent. Biliary ductal dilatation, likely sequela  of cholecystectomy. US Result (most recent):  US THYROID 12/23/2022    Narrative  Thyroid ultrasound    HISTORY: Thyroid nodule noted on prior CT scan. COMPARISON: None. Correlation is made to the CT scan cervical spine 12/04/2021. FINDINGS: The right thyroid lobe measures approximately 5.1 x 1.7 x 1.6 cm. The  left thyroid lobe measures approximately 4.0 x 1.3 x 0.8 cm. The isthmus  measures 0.2 mm. The thyroid gland is homogeneous. There is a colloid cyst at the lower pole right lobe measuring 1.1 cm. Few  additional small cysts are present within the left lobe. At the midpole right lobe is a mixed solid and cystic nodule measuring  approximately 0.7 x 0.4 x 0.5 cm which is hypoechoic. The margins are smooth. There are few peripheral echogenic foci suggesting calcifications. The thyroid  gland demonstrates normal vascularity. Impression  7 mm right thyroid nodule characterize as a TI-RADS 4 nodule. Given  its small size, a follow-up ultrasound in one year would be recommended. Admission date (for inpatients): 1/11/2023   * No surgery found *  * No surgery found *    ASSESSMENT/PLAN:62 yr old female with SBO in the setting of prior Piero-en-Y gastric bypass and 02/18/22 emergent diagnostic laparoscopy, exploratory laparotomy with reduction of jejunojejunal intussusception and bowel pexy and prior abdominal surgeries.       Principal Problem:    Small bowel obstruction (HCC)  Active Problems:    Chronic constipation    Hypokalemia    SBO (small bowel obstruction) (HCC)    History of gastric bypass    Mixed hyperlipidemia    Essential hypertension  Resolved Problems:    * No resolved hospital problems. *       Case has been reviewed with Dr Arian Russo with plan for KUB  Trend labs  Suppository to promote bowel evacuation  Hopefully this can be managed with conservative, non surgical management. She appears more comfortable today and the KUB is showing improvement.    Following with you    Signed:  QUINTIN Hennessy - CNP

## 2023-01-11 NOTE — PROGRESS NOTES
TRANSFER - IN REPORT:    Verbal report received from PRABHA Holland on Kenny Wong being received from [address] [place] [address] ED  for routine progression of care. Report consisted of patients Situation, Background, Assessment and Recommendations(SBAR). Information from the following report(s) SBAR, ED Summary, Intake/Output, and MAR was reviewed. Opportunity for questions and clarification was provided. Assessment completed upon patients arrival to unit and care assumed. Patient received to room 310. Patient connected to monitor and assessment completed. Plan of care reviewed. Patient oriented to room and call light. Patient aware to use call light to communicate any chest pain or needs. Admission skin assessment completed with second RN Mirian Overton and reveals the following: Sacrum and heels are clean, dry, and intact. Scattered bruising noted on BUE. MD Josef Mcnally notified of pt's arrival at 200.

## 2023-01-12 ENCOUNTER — APPOINTMENT (OUTPATIENT)
Dept: GENERAL RADIOLOGY | Age: 63
End: 2023-01-12
Attending: FAMILY MEDICINE
Payer: COMMERCIAL

## 2023-01-12 PROBLEM — E87.6 HYPOKALEMIA: Status: RESOLVED | Noted: 2023-01-11 | Resolved: 2023-01-12

## 2023-01-12 LAB
ANION GAP SERPL CALC-SCNC: 2 MMOL/L (ref 2–11)
BASOPHILS # BLD: 0.1 K/UL (ref 0–0.2)
BASOPHILS NFR BLD: 1 % (ref 0–2)
BUN SERPL-MCNC: 7 MG/DL (ref 8–23)
CALCIUM SERPL-MCNC: 8.4 MG/DL (ref 8.3–10.4)
CHLORIDE SERPL-SCNC: 107 MMOL/L (ref 101–110)
CO2 SERPL-SCNC: 32 MMOL/L (ref 21–32)
CREAT SERPL-MCNC: 0.6 MG/DL (ref 0.6–1)
DIFFERENTIAL METHOD BLD: ABNORMAL
EOSINOPHIL # BLD: 0.1 K/UL (ref 0–0.8)
EOSINOPHIL NFR BLD: 1 % (ref 0.5–7.8)
ERYTHROCYTE [DISTWIDTH] IN BLOOD BY AUTOMATED COUNT: 13.4 % (ref 11.9–14.6)
GLUCOSE SERPL-MCNC: 99 MG/DL (ref 65–100)
HCT VFR BLD AUTO: 32.3 % (ref 35.8–46.3)
HGB BLD-MCNC: 9.9 G/DL (ref 11.7–15.4)
IMM GRANULOCYTES # BLD AUTO: 0 K/UL (ref 0–0.5)
IMM GRANULOCYTES NFR BLD AUTO: 0 % (ref 0–5)
LYMPHOCYTES # BLD: 2.3 K/UL (ref 0.5–4.6)
LYMPHOCYTES NFR BLD: 38 % (ref 13–44)
MCH RBC QN AUTO: 26.5 PG (ref 26.1–32.9)
MCHC RBC AUTO-ENTMCNC: 30.7 G/DL (ref 31.4–35)
MCV RBC AUTO: 86.4 FL (ref 82–102)
MONOCYTES # BLD: 0.8 K/UL (ref 0.1–1.3)
MONOCYTES NFR BLD: 14 % (ref 4–12)
NEUTS SEG # BLD: 2.7 K/UL (ref 1.7–8.2)
NEUTS SEG NFR BLD: 46 % (ref 43–78)
NRBC # BLD: 0 K/UL (ref 0–0.2)
PLATELET # BLD AUTO: 506 K/UL (ref 150–450)
PMV BLD AUTO: 9 FL (ref 9.4–12.3)
POTASSIUM SERPL-SCNC: 3.7 MMOL/L (ref 3.5–5.1)
RBC # BLD AUTO: 3.74 M/UL (ref 4.05–5.2)
SODIUM SERPL-SCNC: 141 MMOL/L (ref 133–143)
WBC # BLD AUTO: 6 K/UL (ref 4.3–11.1)

## 2023-01-12 PROCEDURE — A4216 STERILE WATER/SALINE, 10 ML: HCPCS | Performed by: HOSPITALIST

## 2023-01-12 PROCEDURE — 6360000002 HC RX W HCPCS: Performed by: FAMILY MEDICINE

## 2023-01-12 PROCEDURE — 80048 BASIC METABOLIC PNL TOTAL CA: CPT

## 2023-01-12 PROCEDURE — 99232 SBSQ HOSP IP/OBS MODERATE 35: CPT | Performed by: SURGERY

## 2023-01-12 PROCEDURE — 2580000003 HC RX 258: Performed by: FAMILY MEDICINE

## 2023-01-12 PROCEDURE — 6360000002 HC RX W HCPCS: Performed by: HOSPITALIST

## 2023-01-12 PROCEDURE — 74250 X-RAY XM SM INT 1CNTRST STD: CPT

## 2023-01-12 PROCEDURE — 2580000003 HC RX 258: Performed by: HOSPITALIST

## 2023-01-12 PROCEDURE — C9113 INJ PANTOPRAZOLE SODIUM, VIA: HCPCS | Performed by: HOSPITALIST

## 2023-01-12 PROCEDURE — 1100000000 HC RM PRIVATE

## 2023-01-12 PROCEDURE — C9113 INJ PANTOPRAZOLE SODIUM, VIA: HCPCS | Performed by: FAMILY MEDICINE

## 2023-01-12 PROCEDURE — 85025 COMPLETE CBC W/AUTO DIFF WBC: CPT

## 2023-01-12 PROCEDURE — 2500000003 HC RX 250 WO HCPCS: Performed by: HOSPITALIST

## 2023-01-12 PROCEDURE — 36415 COLL VENOUS BLD VENIPUNCTURE: CPT

## 2023-01-12 PROCEDURE — 6370000000 HC RX 637 (ALT 250 FOR IP): Performed by: HOSPITALIST

## 2023-01-12 PROCEDURE — A4216 STERILE WATER/SALINE, 10 ML: HCPCS | Performed by: FAMILY MEDICINE

## 2023-01-12 PROCEDURE — 6360000004 HC RX CONTRAST MEDICATION: Performed by: FAMILY MEDICINE

## 2023-01-12 RX ADMIN — HYDROCODONE BITARTRATE AND ACETAMINOPHEN 1 TABLET: 10; 325 TABLET ORAL at 12:23

## 2023-01-12 RX ADMIN — POTASSIUM CHLORIDE 10 MEQ: 750 TABLET, EXTENDED RELEASE ORAL at 10:23

## 2023-01-12 RX ADMIN — BUSPIRONE HYDROCHLORIDE 10 MG: 10 TABLET ORAL at 10:23

## 2023-01-12 RX ADMIN — GABAPENTIN 600 MG: 300 CAPSULE ORAL at 10:23

## 2023-01-12 RX ADMIN — SENNOSIDES 8.6 MG: 8.6 TABLET, FILM COATED ORAL at 10:23

## 2023-01-12 RX ADMIN — ROPINIROLE HYDROCHLORIDE 1 MG: 2 TABLET, FILM COATED ORAL at 20:14

## 2023-01-12 RX ADMIN — SODIUM CHLORIDE 40 MG: 9 INJECTION, SOLUTION INTRAMUSCULAR; INTRAVENOUS; SUBCUTANEOUS at 10:22

## 2023-01-12 RX ADMIN — ENOXAPARIN SODIUM 30 MG: 100 INJECTION SUBCUTANEOUS at 10:22

## 2023-01-12 RX ADMIN — DIATRIZOATE MEGLUMINE AND DIATRIZOATE SODIUM 240 ML: 660; 100 LIQUID ORAL; RECTAL at 11:03

## 2023-01-12 RX ADMIN — GABAPENTIN 600 MG: 300 CAPSULE ORAL at 17:07

## 2023-01-12 RX ADMIN — BUSPIRONE HYDROCHLORIDE 10 MG: 10 TABLET ORAL at 20:14

## 2023-01-12 RX ADMIN — BUSPIRONE HYDROCHLORIDE 10 MG: 10 TABLET ORAL at 17:07

## 2023-01-12 RX ADMIN — SODIUM CHLORIDE, PRESERVATIVE FREE 5 ML: 5 INJECTION INTRAVENOUS at 10:24

## 2023-01-12 RX ADMIN — GABAPENTIN 600 MG: 300 CAPSULE ORAL at 20:14

## 2023-01-12 RX ADMIN — MORPHINE SULFATE 2 MG: 2 INJECTION, SOLUTION INTRAMUSCULAR; INTRAVENOUS at 10:40

## 2023-01-12 RX ADMIN — SODIUM CHLORIDE 40 MG: 9 INJECTION, SOLUTION INTRAMUSCULAR; INTRAVENOUS; SUBCUTANEOUS at 01:32

## 2023-01-12 RX ADMIN — DEXTROSE MONOHYDRATE, SODIUM CHLORIDE, AND POTASSIUM CHLORIDE: 50; 4.5; 1.49 INJECTION, SOLUTION INTRAVENOUS at 12:23

## 2023-01-12 ASSESSMENT — PAIN SCALES - GENERAL
PAINLEVEL_OUTOF10: 0
PAINLEVEL_OUTOF10: 0
PAINLEVEL_OUTOF10: 6
PAINLEVEL_OUTOF10: 0
PAINLEVEL_OUTOF10: 7
PAINLEVEL_OUTOF10: 4
PAINLEVEL_OUTOF10: 0

## 2023-01-12 ASSESSMENT — ENCOUNTER SYMPTOMS
BLOOD IN STOOL: 0
VOMITING: 1
CONSTIPATION: 0
EYE ITCHING: 0
COUGH: 0
DIARRHEA: 0
EYE PAIN: 0
SHORTNESS OF BREATH: 0
BACK PAIN: 0
FACIAL SWELLING: 0
CHEST TIGHTNESS: 0
ABDOMINAL DISTENTION: 1
NAUSEA: 1
ABDOMINAL PAIN: 1

## 2023-01-12 ASSESSMENT — PAIN DESCRIPTION - LOCATION
LOCATION: HEAD
LOCATION: GENERALIZED

## 2023-01-12 ASSESSMENT — PAIN DESCRIPTION - DESCRIPTORS
DESCRIPTORS: ACHING
DESCRIPTORS: ACHING;THROBBING

## 2023-01-12 ASSESSMENT — PAIN - FUNCTIONAL ASSESSMENT
PAIN_FUNCTIONAL_ASSESSMENT: PREVENTS OR INTERFERES WITH ALL ACTIVE AND SOME PASSIVE ACTIVITIES
PAIN_FUNCTIONAL_ASSESSMENT: PREVENTS OR INTERFERES WITH MANY ACTIVE NOT PASSIVE ACTIVITIES

## 2023-01-12 ASSESSMENT — PAIN DESCRIPTION - ORIENTATION: ORIENTATION: ANTERIOR

## 2023-01-12 NOTE — PROGRESS NOTES
Hospitalist Progress Note   Admit Date:  2023  2:47 AM   Name:  Alli Ford   Age:  60 y.o. Sex:  female  :  1960   MRN:  [id number]   Room:  [facility]/    Presenting Complaint: No chief complaint on file. Reason(s) for Admission: SBO (small bowel obstruction) ([facility], [place] [address] [K56.609]     Hospital Course:    60 y.o. female with medical history of Piero-en-Y gastric bypass surgery presenting via EMS for the complaint of abdominal pain and distention worsening over the last 24 hours. She is afebrile, mildly tachycardic with a heart rate of 114 otherwise vital signs within appropriate limits. Abdomen does appear to be distended on exam with diffuse tenderness to palpation and guarding in the lower and epigastric region. 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 CT scan abdomen and pelvis shows multiple fluid-filled dilated small bowel loops with transition point in the right lower quadrant. Things concerning for ileus or partial small bowel obstruction. She also has a large amount of fatty stool throughout colon. Given above findings hospitalist consulted for admission. ER PA did contact general surgery who did not recommend NG tube placement at this time due to previous surgical history as well as lack of vomiting in ER. Subjective & 24hr Events (23): Small bowel follow-through later today negative for obstruction. Continues to have flatus. Advance diet to clear liquids. Patient strongly desiring food. Anemia slightly worse, likely secondary to dilutional effect of resuscitation. Assessment & Plan:   Small bowel obstruction  Admission imaging concerning for obstruction.   Has history of obstruction  -Consult general surgery  2023: Small bowel follow-through with no evidence of obstruction, restart diet as tolerated    Chronic constipation  -Naloxegol, senna      History of gastric bypass  Noted       Anxiety  -buspirone      Anticipated discharge needs:      Home with outpatient follow up    Diet:  ADULT DIET; Clear Liquid  DVT PPx: enoxaparin  Code status: Full Code    Hospital Problems:  Principal Problem:    Small bowel obstruction (HCC)  Active Problems:    Abdominal distention    Chronic constipation    History of gastric bypass    Mixed hyperlipidemia    Primary hypertension    Abnormal CT of the abdomen  Resolved Problems:    Hypokalemia      Objective:   Patient Vitals for the past 24 hrs:   Temp Pulse Resp BP SpO2   01/12/23 1322 98.4 °F (36.9 °C) 85 18 112/82 92 %   01/12/23 1223 -- -- 18 -- --   01/12/23 1040 -- -- 18 -- --   01/12/23 0406 97.2 °F (36.2 °C) 64 20 99/80 94 %   01/12/23 0043 98.2 °F (36.8 °C) 79 18 98/85 95 %   01/11/23 2023 97.6 °F (36.4 °C) 71 16 96/68 94 %   01/11/23 1753 98.4 °F (36.9 °C) 65 20 109/73 96 %       Oxygen Therapy  SpO2: 92 %  O2 Device: None (Room air)    Estimated body mass index is 19.11 kg/m² as calculated from the following:    Height as of this encounter: 5' 2\" (1.575 m). Weight as of this encounter: 104 lb 8 oz (47.4 kg). No intake or output data in the 24 hours ending 01/12/23 1527        Blood pressure 112/82, pulse 85, temperature 98.4 °F (36.9 °C), temperature source Oral, resp. rate 18, height 5' 2\" (1.575 m), weight 104 lb 8 oz (47.4 kg), SpO2 92 %. Physical Exam  Vitals and nursing note reviewed. Constitutional:       General: She is not in acute distress. Appearance: She is underweight. She is ill-appearing. She is not diaphoretic. Eyes:      Extraocular Movements: Extraocular movements intact. Cardiovascular:      Rate and Rhythm: Normal rate. Pulmonary:      Effort: Pulmonary effort is normal. No respiratory distress. Abdominal:      General: There is no distension. Tenderness: There is abdominal tenderness (mild). There is no guarding. Musculoskeletal:         General: No deformity. Skin:     Coloration: Skin is not jaundiced or pale. Neurological:      General: No focal deficit present. Mental Status: She is alert and oriented to person, place, and time.    Psychiatric:         Mood and Affect: Mood normal.         Behavior: Behavior normal.         I have personally reviewed labs and tests showing:  Recent Labs:  Recent Results (from the past 48 hour(s))   CBC with Auto Differential    Collection Time: 01/10/23  7:35 PM   Result Value Ref Range    WBC 15.3 (H) 4.3 - 11.1 K/uL    RBC 4.60 4.05 - 5.20 M/uL    Hemoglobin 12.5 11.7 - 15.4 g/dL    Hematocrit 38.6 35.8 - 46.3 %    MCV 83.9 82.0 - 102.0 FL    MCH 27.2 26.1 - 32.9 PG    MCHC 32.4 31.4 - 35.0 g/dL    RDW 13.5 11.9 - 14.6 %    Platelets 478 (H) 585 - 450 K/uL    MPV 8.5 (L) 9.4 - 12.3 FL    nRBC 0.00 0.0 - 0.2 K/uL    Differential Type AUTOMATED      Seg Neutrophils 75 43 - 78 %    Lymphocytes 13 13 - 44 %    Monocytes 11 4.0 - 12.0 %    Eosinophils % 1 0.5 - 7.8 %    Basophils 1 0.0 - 2.0 %    Immature Granulocytes 0 0.0 - 5.0 %    Segs Absolute 11.5 (H) 1.7 - 8.2 K/UL    Absolute Lymph # 1.9 0.5 - 4.6 K/UL    Absolute Mono # 1.7 (H) 0.1 - 1.3 K/UL    Absolute Eos # 0.1 0.0 - 0.8 K/UL    Basophils Absolute 0.1 0.0 - 0.2 K/UL    Absolute Immature Granulocyte 0.1 0.0 - 0.5 K/UL   CMP    Collection Time: 01/10/23  7:35 PM   Result Value Ref Range    Sodium 139 133 - 143 mmol/L    Potassium 3.1 (L) 3.5 - 5.1 mmol/L    Chloride 96 (L) 98 - 107 mmol/L    CO2 31 21 - 32 mmol/L    Anion Gap 12 (H) 2 - 11 mmol/L    Glucose 129 (H) 65 - 100 mg/dL    BUN 23 (H) 7.0 - 18.0 MG/DL    Creatinine 0.70 0.6 - 1.0 MG/DL    Est, Glom Filt Rate >60 >60 ml/min/1.73m2    Calcium 9.2 8.3 - 10.4 MG/DL    Total Bilirubin 0.3 0.2 - 1.1 MG/DL    ALT 13 13.0 - 61.0 U/L    AST 14 (L) 15 - 37 U/L    Alk Phosphatase 57 45.0 - 117.0 U/L    Total Protein 6.4 6.4 - 8.2 g/dL    Albumin 3.9 3.2 - 4.6 g/dL    Globulin 2.5 (L) 2.8 - 4.5 g/dL    Albumin/Globulin Ratio 1.6 0.4 - 1.6     Lipase    Collection Time: 01/10/23  7:35 PM   Result Value Ref Range    Lipase 19 13 - 60 U/L   Lactic Acid    Collection Time: 01/10/23  7:35 PM   Result Value Ref Range    Lactic Acid 1.60 0.4 - 2.0 mmol/L   POCT Glucose    Collection Time: 01/11/23  7:11 AM   Result Value Ref Range    POC Glucose 110 (H) 65 - 100 mg/dL    Performed by: Musa    CBC with Auto Differential    Collection Time: 01/11/23 10:34 AM   Result Value Ref Range    WBC 7.8 4.3 - 11.1 K/uL    RBC 3.76 (L) 4.05 - 5.2 M/uL    Hemoglobin 10.3 (L) 11.7 - 15.4 g/dL    Hematocrit 32.8 (L) 35.8 - 46.3 %    MCV 87.2 82 - 102 FL    MCH 27.4 26.1 - 32.9 PG    MCHC 31.4 31.4 - 35.0 g/dL    RDW 13.5 11.9 - 14.6 %    Platelets 998 (H) 237 - 450 K/uL    MPV 8.9 (L) 9.4 - 12.3 FL    nRBC 0.00 0.0 - 0.2 K/uL    Differential Type AUTOMATED      Seg Neutrophils 65 43 - 78 %    Lymphocytes 22 13 - 44 %    Monocytes 11 4.0 - 12.0 %    Eosinophils % 1 0.5 - 7.8 %    Basophils 1 0.0 - 2.0 %    Immature Granulocytes 0 0.0 - 5.0 %    Segs Absolute 5.1 1.7 - 8.2 K/UL    Absolute Lymph # 1.7 0.5 - 4.6 K/UL    Absolute Mono # 0.9 0.1 - 1.3 K/UL    Absolute Eos # 0.1 0.0 - 0.8 K/UL    Basophils Absolute 0.1 0.0 - 0.2 K/UL    Absolute Immature Granulocyte 0.0 0.0 - 0.5 K/UL   Lactic Acid    Collection Time: 01/11/23 10:34 AM   Result Value Ref Range    Lactic Acid, Plasma 1.0 0.4 - 2.0 MMOL/L   Basic Metabolic Panel    Collection Time: 01/11/23 10:34 AM   Result Value Ref Range    Sodium 140 133 - 143 mmol/L    Potassium 3.1 (L) 3.5 - 5.1 mmol/L    Chloride 102 101 - 110 mmol/L    CO2 32 21 - 32 mmol/L    Anion Gap 6 2 - 11 mmol/L    Glucose 105 (H) 65 - 100 mg/dL    BUN 14 8 - 23 MG/DL    Creatinine 0.70 0.6 - 1.0 MG/DL    Est, Glom Filt Rate >60 >60 ml/min/1.73m2    Calcium 8.3 8.3 - 10.4 MG/DL   Basic Metabolic Panel w/ Reflex to MG    Collection Time: 01/12/23 3:55 AM   Result Value Ref Range    Sodium 141 133 - 143 mmol/L    Potassium 3.7 3.5 - 5.1 mmol/L    Chloride 107 101 - 110 mmol/L    CO2 32 21 - 32 mmol/L    Anion Gap 2 2 - 11 mmol/L    Glucose 99 65 - 100 mg/dL    BUN 7 (L) 8 - 23 MG/DL    Creatinine 0.60 0.6 - 1.0 MG/DL    Est, Glom Filt Rate >60 >60 ml/min/1.73m2    Calcium 8.4 8.3 - 10.4 MG/DL   CBC with Auto Differential    Collection Time: 01/12/23  3:55 AM   Result Value Ref Range    WBC 6.0 4.3 - 11.1 K/uL    RBC 3.74 (L) 4.05 - 5.2 M/uL    Hemoglobin 9.9 (L) 11.7 - 15.4 g/dL    Hematocrit 32.3 (L) 35.8 - 46.3 %    MCV 86.4 82 - 102 FL    MCH 26.5 26.1 - 32.9 PG    MCHC 30.7 (L) 31.4 - 35.0 g/dL    RDW 13.4 11.9 - 14.6 %    Platelets 411 (H) 714 - 450 K/uL    MPV 9.0 (L) 9.4 - 12.3 FL    nRBC 0.00 0.0 - 0.2 K/uL    Differential Type AUTOMATED      Seg Neutrophils 46 43 - 78 %    Lymphocytes 38 13 - 44 %    Monocytes 14 (H) 4.0 - 12.0 %    Eosinophils % 1 0.5 - 7.8 %    Basophils 1 0.0 - 2.0 %    Immature Granulocytes 0 0.0 - 5.0 %    Segs Absolute 2.7 1.7 - 8.2 K/UL    Absolute Lymph # 2.3 0.5 - 4.6 K/UL    Absolute Mono # 0.8 0.1 - 1.3 K/UL    Absolute Eos # 0.1 0.0 - 0.8 K/UL    Basophils Absolute 0.1 0.0 - 0.2 K/UL    Absolute Immature Granulocyte 0.0 0.0 - 0.5 K/UL       I have personally reviewed imaging studies showing: Other Studies:  FL SMALL BOWEL FOLLOW THROUGH ONLY   Final Result   Nonspecific mildly dilated small bowel without obstruction. XR ABDOMEN (KUB) (SINGLE AP VIEW)   Final Result   FINDINGS / IMPRESSION: Small bowel loops are distinctly less dilated than   before. Overall there is less bowel gas. Moderate gas in the rectum. Surgical   changes upper abdomen.  Atelectasis the bases          Current Meds:  Current Facility-Administered Medications   Medication Dose Route Frequency    diatrizoate meglumine-sodium (GASTROGRAFIN) 66-10 % solution 240 mL  240 mL Oral ONCE PRN    sodium chloride flush 0.9 % injection 5-40 mL  5-40 mL IntraVENous 2 times per day    sodium chloride flush 0.9 % injection 5-40 mL  5-40 mL IntraVENous PRN    0.9 % sodium chloride infusion   IntraVENous PRN    ondansetron (ZOFRAN-ODT) disintegrating tablet 4 mg  4 mg Oral Q8H PRN    Or    ondansetron (ZOFRAN) injection 4 mg  4 mg IntraVENous Q6H PRN    aluminum & magnesium hydroxide-simethicone (MAALOX) 200-200-20 MG/5ML suspension 30 mL  30 mL Oral Q6H PRN    acetaminophen (TYLENOL) tablet 650 mg  650 mg Oral Q6H PRN    Or    acetaminophen (TYLENOL) suppository 650 mg  650 mg Rectal Q6H PRN    senna (SENOKOT) tablet 8.6 mg  1 tablet Oral BID    enoxaparin Sodium (LOVENOX) injection 30 mg  30 mg SubCUTAneous Daily    busPIRone (BUSPAR) tablet 10 mg  10 mg Oral TID    gabapentin (NEURONTIN) capsule 600 mg  600 mg Oral TID    HYDROcodone-acetaminophen (NORCO)  MG per tablet 1 tablet  1 tablet Oral Q6H PRN    potassium chloride (KLOR-CON M) extended release tablet 10 mEq  10 mEq Oral Daily    rOPINIRole (REQUIP) tablet 1 mg  1 mg Oral Nightly    pantoprazole (PROTONIX) 40 mg in sodium chloride (PF) 0.9 % 10 mL injection  40 mg IntraVENous Daily    morphine injection 2 mg  2 mg IntraVENous Q4H PRN    bisacodyl (DULCOLAX) suppository 10 mg  10 mg Rectal Daily    traMADol (ULTRAM) tablet 25 mg  25 mg Oral Q6H PRN    Or    traMADol (ULTRAM) tablet 50 mg  50 mg Oral Q6H PRN       Signed:  Marsha Cramer MD

## 2023-01-12 NOTE — PROGRESS NOTES
Eitan Díaz MD   Bariatric & [facility]ar[facility] Surgery [facility] Endoscopy  [address] [place] [address]  Maria Eugenia Gomez  Phone [phone]   Fax [phone]      Date of visit: 2023      Primary/Requesting provider: Marissa Call MD         Name: Shay Ingarm      MRN: [id number]       : 1960       Age: 60 y.o. Sex: female        PCP: Marissa Call MD     CC:  No chief complaint on file. HPI:     Shay Ingram is a 60 y.o. female who has past medical history of RYGB done at [facility]. She then underwent dxlap followed by exlap at [facility] last 2022 due to [address] intussusception. She presented to the ER with 2 day history of abdominal distention and pain. She reports pain was 10/10 at worst. Pain was worse with pressure and better with rest. + nausea. Vomited twice. No fever or chills. Since admission last night, she has been passing flatus and had 2 bowel movements. 2023 - crying this AM. She is hungry. No nausea or vomiting. + flatus. No BM. Abd pain resolved.          PMH:    Past Medical History:   Diagnosis Date    Anxiety     Back problem     Depression     Ex-smoker for less than 1 year     Quit 2018  1 ppd x 30 yrs    Family history of anesthesia complication     Patient's father developed an arrhythmia in PACU s/p hip arthroplasty    GERD (gastroesophageal reflux disease)     well controlled with meds    History of bone density study 2020    osteoporosis    History of mammogram 2020    History of Papanicolaou smear of cervix >5 years aog    Hyperlipidemia     Hypertension     hctz    Hypokalemia 2023    Nicotine vapor product user     0.9%    Sleep apnea     no CPAP       PSH:    Past Surgical History:   Procedure Laterality Date    BREAST BIOPSY Left     BREAST SURGERY      CHOLECYSTECTOMY, LAPAROSCOPIC          GASTRIC BYPASS SURGERY      Dr. 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    LUMBAR LAMINECTOMY  03/15/2019    right L4-5 Lami    NEUROLOGICAL SURGERY  03/29/2019    repair of CSF leak right L4-5    LATIA AND BSO (CERVIX REMOVED)      TOTAL ABDOM HYSTERECTOMY         MEDS:    Current Facility-Administered Medications   Medication Dose Route Frequency Provider Last Rate Last Admin    sodium chloride flush 0.9 % injection 5-40 mL  5-40 mL IntraVENous 2 times per day Jay Streeter MD   10 mL at 01/11/23 2029    sodium chloride flush 0.9 % injection 5-40 mL  5-40 mL IntraVENous PRN Jay Streeter MD        0.9 % sodium chloride infusion   IntraVENous PRN Jay Streeter MD        ondansetron (ZOFRAN-ODT) disintegrating tablet 4 mg  4 mg Oral Q8H PRN Jay Streeter MD        Or    ondansetron [facility]) injection 4 mg  4 mg IntraVENous Q6H PRN Jay Streeter MD        aluminum & magnesium hydroxide-simethicone (MAALOX) 522-468-27 MG/5ML suspension 30 mL  30 mL Oral Q6H PRN Jay Streeter MD   30 mL at 01/11/23 1334    acetaminophen (TYLENOL) tablet 650 mg  650 mg Oral Q6H PRN Jay Streeter MD   650 mg at 01/11/23 0455    Or    acetaminophen (TYLENOL) suppository 650 mg  650 mg Rectal Q6H PRN Jay Streeter MD        dextrose 5 % and 0.45 % NaCl with KCl 20 mEq infusion   IntraVENous Continuous Jay Streeter MD 75 mL/hr at 01/11/23 1908 New Bag at 01/11/23 1908    senna (SENOKOT) tablet 8.6 mg  1 tablet Oral BID Jay Streeter MD   8.6 mg at 01/11/23 2026    enoxaparin Sodium (LOVENOX) injection 30 mg  30 mg SubCUTAneous Daily Jay Streeter MD   30 mg at 01/11/23 1047    busPIRone (BUSPAR) tablet 10 mg  10 mg Oral TID Jay Streeter MD   10 mg at 01/11/23 2027    gabapentin (NEURONTIN) capsule 600 mg  600 mg Oral TID Jay Streeter MD   600 mg at 01/11/23 2026    HYDROcodone-acetaminophen (NORCO)  MG per tablet 1 tablet  1 tablet Oral Q6H PRN Jay Streeter MD   1 tablet at 01/11/23 1333    potassium chloride (KLOR-CON M) extended release tablet 10 mEq  10 mEq Oral Daily David Guillaume MD   10 mEq at 23 1046    rOPINIRole (REQUIP) tablet 1 mg  1 mg Oral Nightly David Guillaume MD   1 mg at 23    pantoprazole (PROTONIX) 40 mg in sodium chloride (PF) 0.9 % 10 mL injection  40 mg IntraVENous Daily David Guillaume MD   40 mg at 23 1047    morphine injection 2 mg  2 mg IntraVENous Q4H PRN David Guillaume MD        bisacodyl (DULCOLAX) suppository 10 mg  10 mg Rectal Daily Jason Bazan MD   10 mg at 23 1048    traMADol (ULTRAM) tablet 25 mg  25 mg Oral Q6H PRN Karen Grewal MD        Or    traMADol Lysbeth Corti) tablet 50 mg  50 mg Oral Q6H PRN Karen Grewal MD            ALLERGIES:      Allergies   Allergen Reactions    Penicillins Hives and Swelling       SH:    Social History     Tobacco Use    Smoking status: Former     Packs/day: 1.00     Types: Cigarettes     Quit date: 2018     Years since quittin.0    Smokeless tobacco: Never   Substance Use Topics    Alcohol use: Not Currently    Drug use: Yes     Types: Marijuana Delon Zuñiga)       FH:    Family History   Problem Relation Age of Onset    Stroke Paternal Grandfather     Stroke Paternal Grandmother     Stroke Maternal Grandfather     Breast Cancer Maternal Grandmother 76    Breast Cancer Mother 79    Hypertension Father     Stroke Father     Hypertension Mother     Stroke Maternal Grandmother        Review of systems:  Review of Systems   Constitutional:  Negative for chills, fatigue, fever and unexpected weight change. HENT:  Negative for ear discharge, ear pain and facial swelling. Eyes:  Negative for pain and itching. Respiratory:  Negative for cough, chest tightness and shortness of breath. Cardiovascular:  Negative for chest pain. Gastrointestinal:  Positive for abdominal distention, abdominal pain, nausea and vomiting. Negative for blood in stool, constipation and diarrhea.    Genitourinary:  Negative for difficulty urinating, dysuria and hematuria. Musculoskeletal:  Negative for back pain, gait problem and neck pain. Skin:  Negative for rash and wound. Neurological:  Negative for facial asymmetry, speech difficulty and weakness. Hematological:  Does not bruise/bleed easily. Psychiatric/Behavioral:  Negative for agitation, decreased concentration and sleep disturbance. Physical Exam:     BP 99/80   Pulse 64   Temp 97.2 °F (36.2 °C) (Temporal)   Resp 20   Ht 5' 2\" (1.575 m)   Wt 104 lb 8 oz (47.4 kg)   SpO2 94%   BMI 19.11 kg/m²     General:  Well-developed, well-nourished, no distress. Psych:  Cooperative, good insight and judgement. Neuro:  Alert, oriented to person, place and time. HEENT:  Normocephalic, atraumatic. Sclera clear. Lungs:  Unlabored breathing. Symmetrical chest expansion. Chest wall:  No tenderness or deformity. Heart:  Regular rate and rhythm. No JVD. Abdomen:  Soft, non-tender, non-distended. No guarding or rebound. Extremities:  Extremities normal, atraumatic, no cyanosis or edema. Skin:  Skin color, texture, turgor normal. No rashes. Labs: All recent labs were reviewed. WBC elevated initially but now normalized.      Recent Results (from the past 24 hour(s))   CBC with Auto Differential    Collection Time: 01/11/23 10:34 AM   Result Value Ref Range    WBC 7.8 4.3 - 11.1 K/uL    RBC 3.76 (L) 4.05 - 5.2 M/uL    Hemoglobin 10.3 (L) 11.7 - 15.4 g/dL    Hematocrit 32.8 (L) 35.8 - 46.3 %    MCV 87.2 82 - 102 FL    MCH 27.4 26.1 - 32.9 PG    MCHC 31.4 31.4 - 35.0 g/dL    RDW 13.5 11.9 - 14.6 %    Platelets 931 (H) 783 - 450 K/uL    MPV 8.9 (L) 9.4 - 12.3 FL    nRBC 0.00 0.0 - 0.2 K/uL    Differential Type AUTOMATED      Seg Neutrophils 65 43 - 78 %    Lymphocytes 22 13 - 44 %    Monocytes 11 4.0 - 12.0 %    Eosinophils % 1 0.5 - 7.8 %    Basophils 1 0.0 - 2.0 %    Immature Granulocytes 0 0.0 - 5.0 %    Segs Absolute 5.1 1.7 - 8.2 K/UL    Absolute Lymph # 1.7 0.5 - 4.6 K/UL    Absolute Mono # 0.9 0.1 - 1.3 K/UL    Absolute Eos # 0.1 0.0 - 0.8 K/UL    Basophils Absolute 0.1 0.0 - 0.2 K/UL    Absolute Immature Granulocyte 0.0 0.0 - 0.5 K/UL   Lactic Acid    Collection Time: 01/11/23 10:34 AM   Result Value Ref Range    Lactic Acid, Plasma 1.0 0.4 - 2.0 MMOL/L   Basic Metabolic Panel    Collection Time: 01/11/23 10:34 AM   Result Value Ref Range    Sodium 140 133 - 143 mmol/L    Potassium 3.1 (L) 3.5 - 5.1 mmol/L    Chloride 102 101 - 110 mmol/L    CO2 32 21 - 32 mmol/L    Anion Gap 6 2 - 11 mmol/L    Glucose 105 (H) 65 - 100 mg/dL    BUN 14 8 - 23 MG/DL    Creatinine 0.70 0.6 - 1.0 MG/DL    Est, Glom Filt Rate >60 >60 ml/min/1.73m2    Calcium 8.3 8.3 - 10.4 MG/DL   Basic Metabolic Panel w/ Reflex to MG    Collection Time: 01/12/23  3:55 AM   Result Value Ref Range    Sodium 141 133 - 143 mmol/L    Potassium 3.7 3.5 - 5.1 mmol/L    Chloride 107 101 - 110 mmol/L    CO2 32 21 - 32 mmol/L    Anion Gap 2 2 - 11 mmol/L    Glucose 99 65 - 100 mg/dL    BUN 7 (L) 8 - 23 MG/DL    Creatinine 0.60 0.6 - 1.0 MG/DL    Est, Glom Filt Rate >60 >60 ml/min/1.73m2    Calcium 8.4 8.3 - 10.4 MG/DL   CBC with Auto Differential    Collection Time: 01/12/23  3:55 AM   Result Value Ref Range    WBC 6.0 4.3 - 11.1 K/uL    RBC 3.74 (L) 4.05 - 5.2 M/uL    Hemoglobin 9.9 (L) 11.7 - 15.4 g/dL    Hematocrit 32.3 (L) 35.8 - 46.3 %    MCV 86.4 82 - 102 FL    MCH 26.5 26.1 - 32.9 PG    MCHC 30.7 (L) 31.4 - 35.0 g/dL    RDW 13.4 11.9 - 14.6 %    Platelets 085 (H) 339 - 450 K/uL    MPV 9.0 (L) 9.4 - 12.3 FL    nRBC 0.00 0.0 - 0.2 K/uL    Differential Type AUTOMATED      Seg Neutrophils 46 43 - 78 %    Lymphocytes 38 13 - 44 %    Monocytes 14 (H) 4.0 - 12.0 %    Eosinophils % 1 0.5 - 7.8 %    Basophils 1 0.0 - 2.0 %    Immature Granulocytes 0 0.0 - 5.0 %    Segs Absolute 2.7 1.7 - 8.2 K/UL    Absolute Lymph # 2.3 0.5 - 4.6 K/UL    Absolute Mono # 0.8 0.1 - 1.3 K/UL    Absolute Eos # 0.1 0.0 - 0.8 K/UL    Basophils Absolute 0.1 0.0 - 0.2 K/UL Absolute Immature Granulocyte 0.0 0.0 - 0.5 K/UL       Imaging: CT images were independently reviewed by me. See A/p. XR ABDOMEN (KUB) (SINGLE AP VIEW)  Narrative: ABDOMINAL FILMS, 1 view(s). INDICATION: Dilated small bowel loops, follow-up. TECHNIQUE:  Supine views of the abdomen on 2 image(s). COMPARISON: CT scan from last night. Impression: FINDINGS / IMPRESSION: Small bowel loops are distinctly less dilated than  before. Overall there is less bowel gas. Moderate gas in the rectum. Surgical  changes upper abdomen. Atelectasis the bases       No diagnosis found. Assessment/Plan:  Shayan Smith is a 60 y.o. female who has signs and symptoms consistent with pSBO    Patient with complex surgical history, now with pSBO. She is passing flatus and having BM. CT scan images personally reviewed. There is some thickening in terminal ileum area but I do not see an obvious obstruction. She has a large amount of stool burden in the colon. We ordered suppositories yesterday and she responded well. BMs could come from stool in colon. I think we will need to do a SBFT with gastrografin today to rule out proximal obstruction. She is hungry which is a good sign. We discussed SBFT study and she understood purpose of study and plan. She is definitely less distended and feeling better. We will monitor closely. Keep NPO, IVFs until SBFT is completed. Time: I spent 25 minutes preparing to see patient (including chart review and preparation), obtaining and/or reviewing additional medical history, performing a physical exam and evaluation, documenting clinical information in the electronic health record, independently interpreting results, communicating results to patient, family or caregiver, and/or coordinating care.       Signed: Topher Ribera MD  Bariatric & Minimally Invasive Surgery  1/12/2023 8:26 AM

## 2023-01-12 NOTE — PLAN OF CARE
Problem: Discharge Planning  Goal: Discharge to home or other facility with appropriate resources  Outcome: Progressing  Flowsheets (Taken 1/11/2023 2027)  Discharge to home or other facility with appropriate resources:   Identify barriers to discharge with patient and caregiver   Arrange for needed discharge resources and transportation as appropriate   Identify discharge learning needs (meds, wound care, etc)   Refer to discharge planning if patient needs post-hospital services based on physician order or complex needs related to functional status, cognitive ability or social support system     Problem: Safety - Adult  Goal: Free from fall injury  Outcome: Progressing  Flowsheets (Taken 1/12/2023 0303)  Free From Fall Injury: Instruct family/caregiver on patient safety     Problem: ABCDS Injury Assessment  Goal: Absence of physical injury  Outcome: Progressing  Flowsheets (Taken 1/12/2023 0303)  Absence of Physical Injury: Implement safety measures based on patient assessment     Problem: Pain  Goal: Verbalizes/displays adequate comfort level or baseline comfort level  Outcome: Progressing  Flowsheets (Taken 1/11/2023 2027)  Verbalizes/displays adequate comfort level or baseline comfort level:   Encourage patient to monitor pain and request assistance   Assess pain using appropriate pain scale   Administer analgesics based on type and severity of pain and evaluate response   Implement non-pharmacological measures as appropriate and evaluate response   Consider cultural and social influences on pain and pain management   Notify Licensed Independent Practitioner if interventions unsuccessful or patient reports new pain

## 2023-01-13 ENCOUNTER — TELEPHONE (OUTPATIENT)
Dept: INTERNAL MEDICINE CLINIC | Facility: CLINIC | Age: 63
End: 2023-01-13

## 2023-01-13 VITALS
BODY MASS INDEX: 19.23 KG/M2 | RESPIRATION RATE: 16 BRPM | WEIGHT: 104.5 LBS | HEIGHT: 62 IN | DIASTOLIC BLOOD PRESSURE: 79 MMHG | TEMPERATURE: 97.7 F | SYSTOLIC BLOOD PRESSURE: 118 MMHG | OXYGEN SATURATION: 95 % | HEART RATE: 83 BPM

## 2023-01-13 PROCEDURE — A4216 STERILE WATER/SALINE, 10 ML: HCPCS | Performed by: HOSPITALIST

## 2023-01-13 PROCEDURE — 99232 SBSQ HOSP IP/OBS MODERATE 35: CPT | Performed by: SURGERY

## 2023-01-13 PROCEDURE — C9113 INJ PANTOPRAZOLE SODIUM, VIA: HCPCS | Performed by: HOSPITALIST

## 2023-01-13 PROCEDURE — 6370000000 HC RX 637 (ALT 250 FOR IP): Performed by: HOSPITALIST

## 2023-01-13 PROCEDURE — 2580000003 HC RX 258: Performed by: HOSPITALIST

## 2023-01-13 PROCEDURE — 6360000002 HC RX W HCPCS: Performed by: HOSPITALIST

## 2023-01-13 RX ORDER — ROPINIROLE 1 MG/1
1 TABLET, FILM COATED ORAL DAILY
Qty: 90 TABLET | Refills: 1 | Status: SHIPPED | OUTPATIENT
Start: 2023-01-13

## 2023-01-13 RX ADMIN — ENOXAPARIN SODIUM 30 MG: 100 INJECTION SUBCUTANEOUS at 08:32

## 2023-01-13 RX ADMIN — SENNOSIDES 8.6 MG: 8.6 TABLET, FILM COATED ORAL at 08:31

## 2023-01-13 RX ADMIN — ALUMINUM HYDROXIDE, MAGNESIUM HYDROXIDE, AND SIMETHICONE 30 ML: 200; 200; 20 SUSPENSION ORAL at 00:39

## 2023-01-13 RX ADMIN — SODIUM CHLORIDE, PRESERVATIVE FREE 10 ML: 5 INJECTION INTRAVENOUS at 08:33

## 2023-01-13 RX ADMIN — BUSPIRONE HYDROCHLORIDE 10 MG: 10 TABLET ORAL at 08:31

## 2023-01-13 RX ADMIN — GABAPENTIN 600 MG: 300 CAPSULE ORAL at 08:31

## 2023-01-13 RX ADMIN — POTASSIUM CHLORIDE 10 MEQ: 750 TABLET, EXTENDED RELEASE ORAL at 08:31

## 2023-01-13 ASSESSMENT — ENCOUNTER SYMPTOMS
BLOOD IN STOOL: 0
CHEST TIGHTNESS: 0
COUGH: 0
DIARRHEA: 0
EYE ITCHING: 0
VOMITING: 0
ABDOMINAL PAIN: 0
CONSTIPATION: 0
BACK PAIN: 0
FACIAL SWELLING: 0
NAUSEA: 0
EYE PAIN: 0
ABDOMINAL DISTENTION: 0
SHORTNESS OF BREATH: 0

## 2023-01-13 ASSESSMENT — PAIN SCALES - GENERAL
PAINLEVEL_OUTOF10: 0
PAINLEVEL_OUTOF10: 0

## 2023-01-13 NOTE — PROGRESS NOTES
TRANSFER - IN REPORT:    Verbal report received from [facility] [address] on Lakshmi Shorts  being received from [facility] for routine progression of patient care      Report consisted of patient's Situation, Background, Assessment and   Recommendations(SBAR). Information from the following report(s) Nurse Handoff Report, Index, Intake/Output, MAR, and Recent Results was reviewed with the receiving nurse. Opportunity for questions and clarification was provided. Assessment completed upon patient's arrival to unit and care assumed.

## 2023-01-13 NOTE — PLAN OF CARE
Problem: Discharge Planning  Goal: Discharge to home or other facility with appropriate resources  Outcome: Progressing  Flowsheets  Taken 1/13/2023 0150 by Kevin Palacio RN  Discharge to home or other facility with appropriate resources: Refer to discharge planning if patient needs post-hospital services based on physician order or complex needs related to functional status, cognitive ability or social support system  Taken 1/12/2023 2000 by Leticia Pinon RN  Discharge to home or other facility with appropriate resources: Identify barriers to discharge with patient and caregiver     Problem: Safety - Adult  Goal: Free from fall injury  Outcome: Progressing  Flowsheets (Taken 1/12/2023 2000 by Leticia Pinon RN)  Free From Fall Injury: Instruct family/caregiver on patient safety     Problem: ABCDS Injury Assessment  Goal: Absence of physical injury  Outcome: Progressing  Flowsheets (Taken 1/12/2023 2000 by Leticia Pinon RN)  Absence of Physical Injury: Implement safety measures based on patient assessment     Problem: Pain  Goal: Verbalizes/displays adequate comfort level or baseline comfort level  Outcome: Progressing  Flowsheets (Taken 1/13/2023 0150 by Early Fillers, RN)  Verbalizes/displays adequate comfort level or baseline comfort level:   Encourage patient to monitor pain and request assistance   Assess pain using appropriate pain scale   Administer analgesics based on type and severity of pain and evaluate response

## 2023-01-13 NOTE — TELEPHONE ENCOUNTER
You are going to need to call this pt and reschedule her hospital follow up. Hospital follow ups should be 40 minutes. If you have to schedule her w/ one of the NP(s), please do so. Thank you.

## 2023-01-13 NOTE — DISCHARGE SUMMARY
Hospitalist Discharge Summary   Admit Date:  2023  2:47 AM   DC Note date: 2023  Name:  Elda Kaplan   Age:  60 y.o. Sex:  female  :  1960   MRN:  [id number]   Room:    PCP:  Feli Schmidt MD    Presenting Complaint: No chief complaint on file. Initial Admission Diagnosis: SBO (small bowel obstruction) ([address] [K56.609]     Problem List for this Hospitalization (present on admission):    Principal Problem:    Small bowel obstruction ([address]  Active Problems:    Chronic constipation    Abdominal distention    Abnormal CT of the abdomen    History of gastric bypass    Mixed hyperlipidemia    Primary hypertension  Resolved Problems:    Hypokalemia      Hospital Course:  Ms. Syl Diaz is a 60 y.o. female with a PMH of Piero-en-Y gastric bypass surgery who presented with a complaint of abdominal pain and distention worsening over the last 24 hours. She is afebrile, mildly tachycardic with a heart rate of 114 otherwise vital signs within appropriate limits. 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 CT abd/pel showed multiple fluid-filled dilated small bowel loops with transition point in the RLQ, concerning for ileus or partial small bowel obstruction. She also has a large amount of fatty stool throughout colon. Given above findings hospitalist consulted for admission. ER PA did contact general surgery who did not recommend NG tube placement at this time due to previous surgical history as well as lack of vomiting in ER. She was initially NPO and responded well to suppositories. The patient's bowel function has returned to her baseline and she is tolerating her diet. SBFT on  did not show any obstruction. Gen surgery has recommended that the patient follow up with her [facility] at [facility].   She should also have a check up and medication review with her PCP in the next 2 weeks. Ms. Vinnie Lawton agrees with the discharge plan and understands the instructions given. She will return home on Jan/13/2023. A&P  Small bowel obstruction  CT abd/pel reviewed  Consulted General surgery, conservative mgmt, signed off on Jan/13  SBFT on Jan/12 with no evidence of obstruction, diet resumed, tolerating, ok to advance per Gen surgery  MiraLAX PRN     Chronic constipation  Naloxegol, senna      History of gastric bypass  Aware      Anxiety  buspirone      Disposition: Home  Diet: ADULT DIET; Clear Liquid  Code Status: Full Code    Follow Ups:   Follow-up Information     Kwabena Cee MD Follow up in 2 week(s). Specialty: Internal Medicine  Why: post discharge check up  Contact information:  [address] [place] [address]  [phone]                       Time spent in patient discharge and coordination 35 minutes. Plan was discussed with the patient. All questions answered. Patient was stable at time of discharge. Instructions given to call a physician or return if any concerns.     Current Discharge Medication List        CONTINUE these medications which have CHANGED    Details   rOPINIRole (REQUIP) 1 MG tablet Take 1 tablet by mouth daily  Qty: 90 tablet, Refills: 1    Associated Diagnoses: Restless leg syndrome           CONTINUE these medications which have NOT CHANGED    Details   hydroCHLOROthiazide (HYDRODIURIL) 25 MG tablet Take 1 tablet by mouth every morning  Qty: 90 tablet, Refills: 1    Associated Diagnoses: Leg edema; Essential hypertension      busPIRone (BUSPAR) 10 MG tablet Take 1 tablet by mouth 3 times daily  Qty: 270 tablet, Refills: 1    Associated Diagnoses: Anxiety and depression      diclofenac (CATAFLAM) 50 MG tablet TAKE 1 TABLET BY MOUTH TWICE A DAY WITH FOOD OR MILK AS NEEDED FOR 30 DAYS      Multiple Vitamins-Minerals (MULTIVITAMIN WOMEN 50+ PO) Take 1 tablet by mouth daily      HYDROcodone-acetaminophen (NORCO)  MG per tablet TAKE 1 TABLET BY MOUTH EVERY 8 HOURS FOR 30 DAYS      potassium chloride (KLOR-CON M) 10 MEQ extended release tablet Take 1 tablet by mouth in the morning. Qty: 90 tablet, Refills: 1    Associated Diagnoses: Essential hypertension      omeprazole (PRILOSEC) 20 MG delayed release capsule Take 1 capsule by mouth in the morning.   Qty: 90 capsule, Refills: 1    Associated Diagnoses: Gastroesophageal reflux disease without esophagitis      ibandronate (BONIVA) 150 MG tablet Take 1 tablet by mouth every 30 days  Qty: 3 tablet, Refills: 3    Associated Diagnoses: Age-related osteoporosis without current pathological fracture      ergocalciferol (ERGOCALCIFEROL) 1.25 MG (68941 UT) capsule Take 1 capsule by mouth every 7 days  Qty: 12 capsule, Refills: 1    Associated Diagnoses: Vitamin D deficiency      butalbital-acetaminophen-caffeine (FIORICET, ESGIC) -40 MG per tablet Take 1 tablet by mouth every 6 hours as needed      gabapentin (NEURONTIN) 300 MG capsule TAKE 2 CAPSULES BY MOUTH 3 TIMES A DAY      ibuprofen (ADVIL;MOTRIN) 600 MG tablet TAKE 1 TABLET BY MOUTH EVERY 6 HOURS AS NEEDED FOR PAIN      morphine (AVINZA) 30 MG extended release capsule Take 1 capsule by oral route every day           STOP taking these medications       Gabapentin 300 MG/6ML SOLN Comments:   Reason for Stopping:         Morphine Sulfate ER 15 MG T12A Comments:   Reason for Stopping:         oxyCODONE-acetaminophen (PERCOCET)  MG per tablet Comments:   Reason for Stopping:         morphine (MS CONTIN) 15 MG extended release tablet Comments:   Reason for Stopping:         buPROPion (WELLBUTRIN XL) 150 MG extended release tablet Comments:   Reason for Stopping:         simvastatin (ZOCOR) 10 MG tablet Comments:   Reason for Stopping:         DULoxetine (CYMBALTA) 60 MG extended release capsule Comments:   Reason for Stopping:               Procedures done this admission:  * No surgery found *    Consults this admission:  IP CONSULT TO GENERAL SURGERY    Echocardiogram results:  No results found for this or any previous visit. Diagnostic Imaging/Tests:   XR ABDOMEN (KUB) (SINGLE AP VIEW)    Result Date: 1/11/2023  FINDINGS / IMPRESSION: Small bowel loops are distinctly less dilated than before. Overall there is less bowel gas. Moderate gas in the rectum. Surgical changes upper abdomen. Atelectasis the bases    CT ABDOMEN PELVIS W IV CONTRAST Additional Contrast? Oral    Result Date: 1/10/2023  1. Multiple fluid-filled dilated small bowel loops, with transition point appearing to be in the right lower quadrant. Findings may be secondary to ileus or partial small bowel obstruction. 2. Extensive/large volume of fatty stool throughout the colon. 3. No evidence of colitis or diverticulitis. No hydronephrosis. 4. Gallbladder is surgically absent. Biliary ductal dilatation, likely sequela of cholecystectomy. US THYROID    Result Date: 12/23/2022  7 mm right thyroid nodule characterize as a TI-RADS 4 nodule. Given its small size, a follow-up ultrasound in one year would be recommended. FL SMALL BOWEL FOLLOW THROUGH ONLY    Result Date: 1/12/2023  Nonspecific mildly dilated small bowel without obstruction.        Labs: Results:       BMP, Mg, Phos Recent Labs     01/10/23  1935 01/11/23  1034 01/12/23  0355    140 141   K 3.1* 3.1* 3.7   CL 96* 102 107   CO2 31 32 32   ANIONGAP 12* 6 2   BUN 23* 14 7*   CREATININE 0.70 0.70 0.60   LABGLOM >60 >60 >60   CALCIUM 9.2 8.3 8.4   GLUCOSE 129* 105* 99      CBC Recent Labs     01/10/23  1935 01/11/23  1034 01/12/23  0355   WBC 15.3* 7.8 6.0   RBC 4.60 3.76* 3.74*   HGB 12.5 10.3* 9.9*   HCT 38.6 32.8* 32.3*   MCV 83.9 87.2 86.4   MCH 27.2 27.4 26.5   MCHC 32.4 31.4 30.7*   RDW 13.5 13.5 13.4   * 492* 506*   MPV 8.5* 8.9* 9.0*   NRBC 0.00 0.00 0.00   SEGS 75 65 46   LYMPHOPCT 13 22 38   EOSRELPCT 1 1 1   MONOPCT 11 11 14*   BASOPCT 1 1 1   IMMGRAN 0 0 0   SEGSABS 11.5* 5.1 2.7   LYMPHSABS 1.9 1.7 2.3   EOSABS 0.1 0.1 0.1   MONOSABS 1.7* 0.9 0.8   BASOSABS 0.1 0.1 0.1   ABSIMMGRAN 0.1 0.0 0.0      LFT Recent Labs     01/10/23  1935   BILITOT 0.3   ALKPHOS 57   AST 14*   ALT 13   PROT 6.4   LABALBU 3.9   GLOB 2.5*      Cardiac  No results found for: NTPROBNP, TROPHS   Coags No results found for: PROTIME, INR, APTT   A1c Lab Results   Component Value Date/Time    LABA1C 5.2 08/04/2022 03:39 PM    LABA1C 5.5 02/01/2022 08:36 AM    LABA1C 5.4 08/30/2019 12:56 PM     08/04/2022 03:39 PM     02/01/2022 08:36 AM     08/30/2019 12:56 PM      Lipids Lab Results   Component Value Date/Time    CHOL 182 08/04/2022 03:39 PM    LDLCALC 87.2 08/04/2022 03:39 PM    LABVLDL 12.8 08/04/2022 03:39 PM    HDL 82 08/04/2022 03:39 PM    CHOLHDLRATIO 2.2 08/04/2022 03:39 PM    TRIG 64 08/04/2022 03:39 PM      Thyroid  Lab Results   Component Value Date/Time    BAO9QJL 0.731 12/23/2022 10:12 AM        Most Recent UA Lab Results   Component Value Date/Time    COLORU Yellow 02/01/2022 08:36 AM    SPECGRAV >=1.030 02/01/2022 08:36 AM    PROTEINU 1+ 02/01/2022 08:36 AM    GLUCOSEU Negative 02/01/2022 08:36 AM    KETUA Trace 02/01/2022 08:36 AM    BILIRUBINUR Negative 02/01/2022 08:36 AM    BLOODU 1+ 02/01/2022 08:36 AM    UROBILINOGEN 1.0 02/01/2022 08:36 AM    NITRU Negative 02/01/2022 08:36 AM    LEUKOCYTESUR 2+ 02/01/2022 08:36 AM    WBCUA 6-10 02/01/2022 08:36 AM    RBCUA 0-2 02/01/2022 08:36 AM    BACTERIA Few 02/01/2022 08:36 AM    MUCUS Present 12/30/2020 08:28 AM        No results for input(s): CULTURE in the last 720 hours. All Labs from Last 24 Hrs:  No results found for this or any previous visit (from the past 24 hour(s)).     Allergies   Allergen Reactions    Penicillins Hives and Swelling     Immunization History   Administered Date(s) Administered    COVID-19, PFIZER PURPLE top, DILUTE for use, (age 15 y+), 30mcg/0.3mL 09/07/2021, 09/07/2021, 10/12/2021    Influenza Virus Vaccine 11/01/2018    Influenza, FLUBLOK, (age 25 y+), PF, 0.5mL 02/19/2022    Influenza, FLUCELVAX, (age 10 mo+), MDCK, PF, 0.5mL 10/24/2022       Recent Vital Data:  Patient Vitals for the past 24 hrs:   Temp Pulse Resp BP SpO2   01/13/23 1103 97.7 °F (36.5 °C) 83 16 118/79 95 %   01/13/23 0708 98.1 °F (36.7 °C) 67 16 112/80 93 %   01/13/23 0511 97.9 °F (36.6 °C) 82 18 118/78 93 %   01/13/23 0147 97.9 °F (36.6 °C) 68 18 119/69 98 %   01/13/23 0130 -- -- -- 121/81 --   01/13/23 0038 98.7 °F (37.1 °C) 80 16 86/65 94 %   01/12/23 2110 98.6 °F (37 °C) 77 10 100/70 99 %   01/12/23 1727 97.8 °F (36.6 °C) 80 18 121/79 98 %   01/12/23 1322 98.4 °F (36.9 °C) 85 18 112/82 92 %   01/12/23 1223 -- -- 18 -- --       Oxygen Therapy  SpO2: 95 %  O2 Device: None (Room air)    Estimated body mass index is 19.11 kg/m² as calculated from the following:    Height as of this encounter: 5' 2\" (1.575 m). Weight as of this encounter: 104 lb 8 oz (47.4 kg). Intake/Output Summary (Last 24 hours) at 1/13/2023 1144  Last data filed at 1/13/2023 0715  Gross per 24 hour   Intake 420 ml   Output 500 ml   Net -80 ml         Physical Exam:  General:    Ambulatory. No overt distress  Head:  Normocephalic, atraumatic  Eyes:  Sclerae appear normal.  Pupils equally round. HENT:  Nares appear normal, no drainage. Moist mucous membranes  Neck:  No restricted ROM. Trachea midline  CV:   RRR. No m/r/g. Lungs: No wheezing. Respirations even, unlabored  Abdomen:   Soft, nondistended. Extremities: Warm and dry. No cyanosis or clubbing. No edema. Skin:     No rashes. Normal coloration  Neuro:  CN II-XII grossly intact. Psych:  Normal mood and affect. Signed:  QUINTIN Goodson CNP    Part of this note may have been written by using a voice dictation software. The note has been proof read but may still contain some grammatical/other typographical errors.

## 2023-01-13 NOTE — PLAN OF CARE
Problem: Discharge Planning  Goal: Discharge to home or other facility with appropriate resources  1/13/2023 1156 by Josse Kelly RN  Outcome: Adequate for Discharge  1/13/2023 0750 by Josse Kelly RN  Outcome: Progressing  Flowsheets  Taken 1/13/2023 0150 by Isi Terry RN  Discharge to home or other facility with appropriate resources: Refer to discharge planning if patient needs post-hospital services based on physician order or complex needs related to functional status, cognitive ability or social support system  Taken 1/12/2023 2000 by Brian Rowe RN  Discharge to home or other facility with appropriate resources: Identify barriers to discharge with patient and caregiver     Problem: Safety - Adult  Goal: Free from fall injury  1/13/2023 1156 by Josse Kelly RN  Outcome: Adequate for Discharge  1/13/2023 0750 by Josse Kelly RN  Outcome: Progressing  Flowsheets (Taken 1/12/2023 2000 by Brian Rowe RN)  Free From Fall Injury: Instruct family/caregiver on patient safety     Problem: ABCDS Injury Assessment  Goal: Absence of physical injury  1/13/2023 1156 by Josse Kelly RN  Outcome: Adequate for Discharge  1/13/2023 0750 by Josse Kelly RN  Outcome: Progressing  Flowsheets (Taken 1/12/2023 2000 by Brian Rowe RN)  Absence of Physical Injury: Implement safety measures based on patient assessment     Problem: Pain  Goal: Verbalizes/displays adequate comfort level or baseline comfort level  1/13/2023 1156 by Josse Kelly RN  Outcome: Adequate for Discharge  1/13/2023 0750 by Josse Kelly RN  Outcome: Progressing  Flowsheets (Taken 1/13/2023 0150 by Isi Terry RN)  Verbalizes/displays adequate comfort level or baseline comfort level:   Encourage patient to monitor pain and request assistance   Assess pain using appropriate pain scale   Administer analgesics based on type and severity of pain and evaluate response

## 2023-01-13 NOTE — PROGRESS NOTES
TRANSFER - OUT REPORT:    Verbal report given to [facility] RN on Markos Petersen  being transferred to  [phone] for routine progression of patient care       Report consisted of patient's Situation, Background, Assessment and   Recommendations(SBAR). Information from the following report(s) Nurse Handoff Report was reviewed with the receiving nurse. [place] Assessment: No data recorded  Lines:   Peripheral IV 01/10/23 Right Forearm (Active)   Site Assessment Clean, dry & intact 01/13/23 0000   Line Status Capped; Infusing 01/13/23 0000   Line Care Connections checked and tightened 01/13/23 0000   Phlebitis Assessment No symptoms 01/13/23 0000   Infiltration Assessment 0 01/13/23 0000   Alcohol Cap Used Yes 01/13/23 0000   Dressing Status Clean, dry & intact 01/13/23 0000   Dressing Type Transparent 01/13/23 0000        Opportunity for questions and clarification was provided.       Patient transported with:  Registered Nurse

## 2023-01-13 NOTE — PROGRESS NOTES
Hector Reno MD   Bariatric & Advanced Laparoscopic Surgery & Endoscopy  [address] [facility] [address], [address] [facility]  Maria Eugenia Gomez  Phone [phone]   Fax [phone]      Date of visit: 2023      Primary/Requesting provider: Ramin Steinberg MD         Name: John Wilson      MRN: [id number]       : 1960       Age: 60 y.o. Sex: female        PCP: Ramin Steinberg MD     CC:  No chief complaint on file. HPI:     John Wilson is a 60 y.o. female who has past medical history of RYGB done at [facility]. She then underwent dxlap followed by exlap at [facility] last 2022 due to [address] intussusception. She presented to the ER with 2 day history of abdominal distention and pain. She reports pain was 10/10 at worst. Pain was worse with pressure and better with rest. + nausea. Vomited twice. No fever or chills. Since admission last night, she has been passing flatus and had 2 bowel movements. 2023 - crying this AM. She is hungry. No nausea or vomiting. + flatus. No BM. Abd pain resolved. 2023 - doing well this AM. SBFT with no obstruction. Started in CLD yesterday and she is tolerating it well. No abdominal pain. No nausea or vomiting. + flatus and multiple bowel movements.          PMH:    Past Medical History:   Diagnosis Date    Anxiety     Back problem     Depression     Ex-smoker for less than 1 year     Quit 2018  1 ppd x 30 yrs    Family history of anesthesia complication     Patient's father developed an arrhythmia in PACU s/p hip arthroplasty    GERD (gastroesophageal reflux disease)     well controlled with meds    History of bone density study 2020    osteoporosis    History of mammogram 2020    History of Papanicolaou smear of cervix >5 years aog    Hyperlipidemia     Hypertension     hctz    Hypokalemia 2023    Nicotine vapor product user     0.9%    Sleep apnea     no CPAP       PSH:    Past Surgical History:   Procedure Laterality Date    BREAST BIOPSY Left     BREAST SURGERY  2014    CHOLECYSTECTOMY, LAPAROSCOPIC      1999    GASTRIC BYPASS SURGERY  2014    Dr. 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    LUMBAR LAMINECTOMY  03/15/2019    right L4-5 Lami    NEUROLOGICAL SURGERY  03/29/2019    repair of CSF leak right L4-5    LATIA AND BSO (CERVIX REMOVED)      TOTAL ABDOM HYSTERECTOMY         MEDS:    Current Facility-Administered Medications   Medication Dose Route Frequency Provider Last Rate Last Admin    diatrizoate meglumine-sodium (GASTROGRAFIN) 66-10 % solution 240 mL  240 mL Oral ONCE PRN Adalid Medina MD   240 mL at 01/12/23 1103    sodium chloride flush 0.9 % injection 5-40 mL  5-40 mL IntraVENous 2 times per day Koki Guevara MD   5 mL at 01/12/23 1024    sodium chloride flush 0.9 % injection 5-40 mL  5-40 mL IntraVENous PRN Koki Guevara MD        0.9 % sodium chloride infusion   IntraVENous PRN Koki Guevara MD        ondansetron (ZOFRAN-ODT) disintegrating tablet 4 mg  4 mg Oral Q8H PRN Koki Guevara MD        Or    ondansetron [facility] injection 4 mg  4 mg IntraVENous Q6H PRN Koki Guevara MD        aluminum & magnesium hydroxide-simethicone (MAALOX) [phone] MG/5ML suspension 30 mL  30 mL Oral Q6H PRN Koki Guevara MD   30 mL at 01/13/23 0039    acetaminophen (TYLENOL) tablet 650 mg  650 mg Oral Q6H PRN Koki Guevara MD   650 mg at 01/11/23 0455    Or    acetaminophen (TYLENOL) suppository 650 mg  650 mg Rectal Q6H PRN Koki Guevara MD        senna (SENOKOT) tablet 8.6 mg  1 tablet Oral BID Koki Guevara MD   8.6 mg at 01/12/23 1023    enoxaparin Sodium (LOVENOX) injection 30 mg  30 mg SubCUTAneous Daily Koki Guevara MD   30 mg at 01/12/23 1022    busPIRone (BUSPAR) tablet 10 mg  10 mg Oral TID Koki Guevara MD   10 mg at 01/12/23 2014    gabapentin (NEURONTIN) capsule 600 mg  600 mg Oral TID Koki Guevara MD   600 mg at 01/12/23 2014 HYDROcodone-acetaminophen (NORCO)  MG per tablet 1 tablet  1 tablet Oral Q6H PRN Sahara Amor MD   1 tablet at 23 1223    potassium chloride (KLOR-CON M) extended release tablet 10 mEq  10 mEq Oral Daily Sahara Amor MD   10 mEq at 23 1023    rOPINIRole (REQUIP) tablet 1 mg  1 mg Oral Nightly Sahara Amor MD   1 mg at 23    pantoprazole (PROTONIX) 40 mg in sodium chloride (PF) 0.9 % 10 mL injection  40 mg IntraVENous Daily Sahara Amor MD   40 mg at 23 1022    morphine injection 2 mg  2 mg IntraVENous Q4H PRN Sahara Amor MD   2 mg at 23 1040    bisacodyl (DULCOLAX) suppository 10 mg  10 mg Rectal Daily Heber Antonio MD   10 mg at 23 1048    traMADol (ULTRAM) tablet 25 mg  25 mg Oral Q6H PRN Jackeline Sidhu MD        Or    traMADol Herschell Fortune) tablet 50 mg  50 mg Oral Q6H PRN Jackeline Sidhu MD            ALLERGIES:      Allergies   Allergen Reactions    Penicillins Hives and Swelling       SH:    Social History     Tobacco Use    Smoking status: Former     Packs/day: 1.00     Types: Cigarettes     Quit date: 2018     Years since quittin.0    Smokeless tobacco: Never   Substance Use Topics    Alcohol use: Not Currently    Drug use: Yes     Types: Marijuana Adeline Coad)       FH:    Family History   Problem Relation Age of Onset    Stroke Paternal Grandfather     Stroke Paternal Grandmother     Stroke Maternal Grandfather     Breast Cancer Maternal Grandmother 76    Breast Cancer Mother 79    Hypertension Father     Stroke Father     Hypertension Mother     Stroke Maternal Grandmother        Review of systems:  Review of Systems   Constitutional:  Negative for chills, fatigue, fever and unexpected weight change. HENT:  Negative for ear discharge, ear pain and facial swelling. Eyes:  Negative for pain and itching. Respiratory:  Negative for cough, chest tightness and shortness of breath. Cardiovascular:  Negative for chest pain. Gastrointestinal:  Negative for abdominal distention, abdominal pain, blood in stool, constipation, diarrhea, nausea and vomiting. Genitourinary:  Negative for difficulty urinating, dysuria and hematuria. Musculoskeletal:  Negative for back pain, gait problem and neck pain. Skin:  Negative for rash and wound. Neurological:  Negative for facial asymmetry, speech difficulty and weakness. Hematological:  Does not bruise/bleed easily. Psychiatric/Behavioral:  Negative for agitation, decreased concentration and sleep disturbance. Physical Exam:     /80   Pulse 67   Temp 98.1 °F (36.7 °C) (Oral)   Resp 16   Ht 5' 2\" (1.575 m)   Wt 104 lb 8 oz (47.4 kg)   SpO2 93%   BMI 19.11 kg/m²     General:  Well-developed, well-nourished, no distress. Psych:  Cooperative, good insight and judgement. Neuro:  Alert, oriented to person, place and time. HEENT:  Normocephalic, atraumatic. Sclera clear. Lungs:  Unlabored breathing. Symmetrical chest expansion. Chest wall:  No tenderness or deformity. Heart:  Regular rate and rhythm. No JVD. Abdomen:  Soft, non-tender, non-distended. No guarding or rebound. Extremities:  Extremities normal, atraumatic, no cyanosis or edema. Skin:  Skin color, texture, turgor normal. No rashes. Labs: All recent labs were reviewed. WBC elevated initially but now normalized. No results found for this or any previous visit (from the past 24 hour(s)). Imaging: CT images were independently reviewed by me. See A/p. FL SMALL BOWEL FOLLOW THROUGH ONLY  Narrative: Small bowel follow-through    INDICATION: Rule out small bowel obstruction    COMPARISON: CT 1/10/2023    FINDINGS:  Serial radiographs were obtained of the abdomen before and after administration  of 240 mL Gastrografin contrast by mouth. Piero-en-Y anatomy present. There is normal opacification of mildly dilated small  bowel loops.  Contrast reaches the right colon by the image obtained at 2 hours. No intraperitoneal free air evident. No extraluminal contrast.    Fluoroscopy time: No fluoroscopy was performed. Fluoroscopic spot images: No fluoroscopy was performed. Impression: Nonspecific mildly dilated small bowel without obstruction. No diagnosis found. Assessment/Plan:  Roseanne Garcia is a 60 y.o. female who has signs and symptoms consistent with pSBO    Patient with complex surgical history, now with pSBO. She is passing flatus and having BM. CT scan images personally reviewed. There is some thickening in terminal ileum area but I do not see an obvious obstruction. She has a large amount of stool burden in the colon. We ordered suppositories and she responded well. SBFT images reviewed and contrast made it to the colon in 2 hours. No obvious obstruction. Ok to ADAT to regular diet  No surgical interventions needed. Will sign off. She should follow up with her surgeon at [address] Time: I spent 25 minutes preparing to see patient (including chart review and preparation), obtaining and/or reviewing additional medical history, performing a physical exam and evaluation, documenting clinical information in the electronic health record, independently interpreting results, communicating results to patient, family or caregiver, and/or coordinating care.       Signed: Holly Sotelo MD  Bariatric & Minimally Invasive Surgery  1/13/2023 8:10 AM

## 2023-01-13 NOTE — PROGRESS NOTES
END OF SHIFT NOTE:    INTAKE/OUTPUT  01/12 0701 - 01/13 0700  In: 5 [P.O.:420]  Out: 300 [Urine:300]  Voiding: Yes  Catheter: No  Drain:              Flatus: Patient does have flatus present. Stool:  occurrences. Characteristics:  Stool Appearance: Formed  Stool Color: Brown  Stool Amount: Medium  Stool Assessment  Incontinence: No  Stool Appearance: Formed  Stool Color: Brown  Stool Amount: Medium  Stool Source: Rectum  Last BM (including prior to admit): 01/12/23 (per patient)    Emesis:  occurrences. Characteristics:        VITAL SIGNS  Patient Vitals for the past 12 hrs:   Temp Pulse Resp BP SpO2   01/13/23 0511 97.9 °F (36.6 °C) 82 18 118/78 93 %   01/13/23 0147 97.9 °F (36.6 °C) 68 18 119/69 98 %   01/13/23 0130 -- -- -- 121/81 --   01/13/23 0038 98.7 °F (37.1 °C) 80 16 86/65 94 %   01/12/23 2110 98.6 °F (37 °C) 77 10 100/70 99 %       Pain Assessment  Pain Level: 0 (01/13/23 0000)  Pain Location: Generalized  Patient's Stated Pain Goal: 0 - No pain    Ambulating  Yes    Shift report given to oncoming nurse at the bedside.     Kevin Palacio RN

## 2023-01-13 NOTE — CARE COORDINATION
Pt with discharge orders this day. Chart screened by  for discharge planning. No CM needs identified at time of discharge. Milestones met.          01/11/23 1212   Service Assessment   Patient Orientation Alert and Oriented   Cognition Alert   History Provided By Patient   Primary Caregiver Self   Support Systems Spouse/Significant Other;Children;[employer]/[place] [employer];Friends/Neighbors   PCP Verified by CM Yes  Ezekiel Boyle)   Prior Functional Level Independent in ADLs/IADLs   Current Functional Level Independent in ADLs/IADLs   Can patient return to prior living arrangement Yes   Ability to make needs known: Good   Family able to assist with home care needs: Yes   Would you like for me to discuss the discharge plan with any other family members/significant others, and if so, who? Yes   Community Resources None   Social/Functional History   Lives With Significant other   Type of [place] [address] One level   ADL Assistance Independent   Ambulation Assistance Independent   Active  Yes   Mode of Transportation Car   Occupation Full time employment   Discharge Planning   Type of Residence Trailer/Mobile Home   Living Arrangements Spouse/Significant Other   Current Services Prior To Admission None   Potential Assistance Needed N/A   DME Ordered? No   Potential Assistance Purchasing Medications No   Type of Home Care Services None   Patient expects to be discharged to: [facility] Discharge   Transition of Care Consult (CM Consult) Discharge [facility] [address] Discharge None   [facility] Information Provided?  Yes   Mode of Transport at Discharge Other (see comment)  (Family)   Confirm Follow Up Transport Self   Condition of Participation: Discharge Planning   The Plan for Transition of Care is related to the following treatment goals: Home

## 2023-01-16 DIAGNOSIS — F51.01 PRIMARY INSOMNIA: ICD-10-CM

## 2023-01-16 RX ORDER — ZOLPIDEM TARTRATE 10 MG/1
10 TABLET ORAL NIGHTLY PRN
Qty: 30 TABLET | Refills: 0 | OUTPATIENT
Start: 2023-01-16 | End: 2023-02-15

## 2023-01-18 DIAGNOSIS — F51.01 PRIMARY INSOMNIA: ICD-10-CM

## 2023-01-18 DIAGNOSIS — K21.9 GASTROESOPHAGEAL REFLUX DISEASE WITHOUT ESOPHAGITIS: ICD-10-CM

## 2023-01-18 NOTE — TELEPHONE ENCOUNTER
Patient requesting refills on omeprazole and zolpidem.  Send to [facility] in [facility]

## 2023-01-20 RX ORDER — ZOLPIDEM TARTRATE 10 MG/1
10 TABLET ORAL NIGHTLY PRN
Qty: 30 TABLET | Refills: 2 | Status: SHIPPED | OUTPATIENT
Start: 2023-01-20 | End: 2023-02-19

## 2023-01-20 RX ORDER — OMEPRAZOLE 20 MG/1
20 CAPSULE, DELAYED RELEASE ORAL DAILY
Qty: 90 CAPSULE | Refills: 1 | Status: SHIPPED | OUTPATIENT
Start: 2023-01-20

## 2023-01-27 ENCOUNTER — OFFICE VISIT (OUTPATIENT)
Dept: INTERNAL MEDICINE CLINIC | Facility: CLINIC | Age: 63
End: 2023-01-27

## 2023-01-27 VITALS
RESPIRATION RATE: 16 BRPM | OXYGEN SATURATION: 99 % | SYSTOLIC BLOOD PRESSURE: 130 MMHG | TEMPERATURE: 97 F | WEIGHT: 109 LBS | BODY MASS INDEX: 20.06 KG/M2 | HEIGHT: 62 IN | HEART RATE: 90 BPM | DIASTOLIC BLOOD PRESSURE: 80 MMHG

## 2023-01-27 DIAGNOSIS — Z09 HOSPITAL DISCHARGE FOLLOW-UP: Primary | ICD-10-CM

## 2023-01-27 DIAGNOSIS — Z12.11 SCREEN FOR COLON CANCER: ICD-10-CM

## 2023-01-27 DIAGNOSIS — K56.609 SMALL BOWEL OBSTRUCTION (HCC): ICD-10-CM

## 2023-01-27 RX ORDER — BUPROPION HYDROCHLORIDE 150 MG/1
150 TABLET ORAL EVERY MORNING
Qty: 90 TABLET | Refills: 0 | Status: SHIPPED | OUTPATIENT
Start: 2023-01-27

## 2023-01-27 RX ORDER — BUPROPION HYDROCHLORIDE 150 MG/1
150 TABLET ORAL EVERY MORNING
COMMUNITY
End: 2023-01-27 | Stop reason: SDUPTHER

## 2023-01-27 ASSESSMENT — PATIENT HEALTH QUESTIONNAIRE - PHQ9
SUM OF ALL RESPONSES TO PHQ QUESTIONS 1-9: 0
SUM OF ALL RESPONSES TO PHQ QUESTIONS 1-9: 0
1. LITTLE INTEREST OR PLEASURE IN DOING THINGS: 0
SUM OF ALL RESPONSES TO PHQ QUESTIONS 1-9: 0
SUM OF ALL RESPONSES TO PHQ QUESTIONS 1-9: 0
2. FEELING DOWN, DEPRESSED OR HOPELESS: 0
SUM OF ALL RESPONSES TO PHQ9 QUESTIONS 1 & 2: 0

## 2023-01-27 ASSESSMENT — ENCOUNTER SYMPTOMS
ABDOMINAL PAIN: 0
VOMITING: 0
SHORTNESS OF BREATH: 0
CONSTIPATION: 0
BLOOD IN STOOL: 0
COUGH: 0
NAUSEA: 1
ABDOMINAL DISTENTION: 1
DIARRHEA: 0

## 2023-01-27 NOTE — PROGRESS NOTES
Post-Discharge Transitional Care Follow Up      Nai Cosme   YOB: 1960    Date of Office Visit:  1/27/2023  Date of Hospital Admission: 1/11/23  Date of Hospital Discharge: 1/13/23  Readmission Risk Score (high >=14%. Medium >=10%):Readmission Risk Score: 8.9      Care management risk score Rising risk (score 2-5) and Complex Care (Scores >=6): No Risk Score On File     Non face to face  following discharge, date last encounter closed (first attempt may have been earlier): *No documented post hospital discharge outreach found in the last 14 days     Call initiated 2 business days of discharge: *No response recorded in the last 14 days     Hospital discharge follow-up  -     MT DISCHARGE MEDS RECONCILED W/ CURRENT OUTPATIENT MED LIST    Small bowel obstruction (HCC)  -     External Referral to General Surgery  Resolved with conservative management.     Screen for colon cancer  -     External Referral to General Surgery  Referral placed.     Medical Decision Making: moderate complexity    On this date 1/27/2023 I have spent 40 minutes reviewing previous notes, test results and face to face with the patient discussing the diagnosis and importance of compliance with the treatment plan as well as documenting on the day of the visit.       Subjective:   HPI    Inpatient course: Discharge summary reviewed- see chart.    Interval history/Current status:     Hospital Course:  Ms. Cosme is a 62 y.o. female with a PMH of Piero-en-Y gastric bypass surgery who presented with a complaint of abdominal pain and distention worsening over the last 24 hours.  She is afebrile, mildly tachycardic with a heart rate of 114 otherwise vital signs within appropriate limits.  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     CT abd/pel showed multiple  fluid-filled dilated small bowel loops with transition point in the RLQ, concerning for ileus or partial small bowel obstruction. She also has a large amount of fatty stool throughout colon. Given above findings, hospitalist consulted for admission. ER PA did contact general surgery who did not recommend NG tube placement at this time due to previous surgical history as well as lack of vomiting in ER. She was initially NPO and responded well to suppositories. The patient's bowel function has returned to her baseline and she is tolerating her diet. SBFT on Jan 12 did not show any obstruction. Gen surgery has recommended that the patient follow up with her surgeon at [facility]. Since returning home, patient is doing well. She denies any further abdominal pain. Bowel habits have returned to her normal. She is having a soft, formed BM every 2-3 days. She is taking Miralax every other day. Since being in the hospital, she reports having nausea and bloating right before having a BM. Symptoms completely resolve afterwards. She has never had a colonoscopy. She is agreeable to referral and would like to see her general surgeon from [facility], Dr. Isa Calero.           Patient Active Problem List   Diagnosis    Chronic pain syndrome    Spinal stenosis of lumbar region with neurogenic claudication    HNP (herniated nucleus pulposus), lumbar    Osteoporosis    Gastroesophageal reflux disease without esophagitis    CSF leak    Anxiety and depression    History of gastric bypass    Mixed hyperlipidemia    Vitamin D deficiency    Prediabetes    Radiculopathy    Primary hypertension    Compression fracture of T12 vertebra (HCC)    Postoperative CSF leak    Restless leg syndrome    Primary insomnia    Other headache syndrome    DDD (degenerative disc disease), lumbar    Chronic constipation    Cervical spondylosis without myelopathy    Late effect of fracture of spine and trunk without spinal cord lesion    Lumbar post-laminectomy syndrome    Small bowel obstruction (HCC)    Abdominal distention    Abnormal CT of the abdomen       Medications listed as ordered at the time of discharge from hospital     Medication List            Accurate as of January 27, 2023  3:39 PM. If you have any questions, ask your nurse or doctor. CONTINUE taking these medications      buPROPion 150 MG extended release tablet  Commonly known as: WELLBUTRIN XL  Take 1 tablet by mouth every morning     busPIRone 10 MG tablet  Commonly known as: BUSPAR  Take 1 tablet by mouth 3 times daily     butalbital-acetaminophen-caffeine -40 MG per tablet  Commonly known as: FIORICET, ESGIC     diclofenac 50 MG tablet  Commonly known as: CATAFLAM     ergocalciferol 1.25 MG (06931 UT) capsule  Commonly known as: ERGOCALCIFEROL  Take 1 capsule by mouth every 7 days     gabapentin 300 MG capsule  Commonly known as: NEURONTIN     hydroCHLOROthiazide 25 MG tablet  Commonly known as: HYDRODIURIL  Take 1 tablet by mouth every morning     HYDROcodone-acetaminophen  MG per tablet  Commonly known as: NORCO     ibandronate 150 MG tablet  Commonly known as: BONIVA  Take 1 tablet by mouth every 30 days     morphine 30 MG extended release capsule  Commonly known as: AVINZA     MULTIVITAMIN WOMEN 50+ PO     omeprazole 20 MG delayed release capsule  Commonly known as: PRILOSEC  Take 1 capsule by mouth daily     potassium chloride 10 MEQ extended release tablet  Commonly known as: KLOR-CON M  Take 1 tablet by mouth in the morning. rOPINIRole 1 MG tablet  Commonly known as: REQUIP  Take 1 tablet by mouth daily     zolpidem 10 MG tablet  Commonly known as: AMBIEN  Take 1 tablet by mouth nightly as needed for Sleep for up to 30 days.             STOP taking these medications      ibuprofen 600 MG tablet  Commonly known as: ADVIL;MOTRIN  Stopped by: Carly Brennan APRN - CNP               Where to Get Your Medications        These medications were sent to [facility] [address] [facility] - P [phone] - F [phone]  [address] [facility], [address]      Phone: [phone]   buPROPion 150 MG extended release tablet          Medications marked \"taking\" at this time  Outpatient Medications Marked as Taking for the 1/27/23 encounter (Office Visit) with QUINTIN Bergeron - CNP   Medication Sig Dispense Refill    buPROPion (WELLBUTRIN XL) 150 MG extended release tablet Take 1 tablet by mouth every morning 90 tablet 0    zolpidem (AMBIEN) 10 MG tablet Take 1 tablet by mouth nightly as needed for Sleep for up to 30 days. 30 tablet 2    omeprazole (PRILOSEC) 20 MG delayed release capsule Take 1 capsule by mouth daily 90 capsule 1    rOPINIRole (REQUIP) 1 MG tablet Take 1 tablet by mouth daily 90 tablet 1    hydroCHLOROthiazide (HYDRODIURIL) 25 MG tablet Take 1 tablet by mouth every morning 90 tablet 1    busPIRone (BUSPAR) 10 MG tablet Take 1 tablet by mouth 3 times daily 270 tablet 1    diclofenac (CATAFLAM) 50 MG tablet TAKE 1 TABLET BY MOUTH TWICE A DAY WITH FOOD OR MILK AS NEEDED FOR 30 DAYS      Multiple Vitamins-Minerals (MULTIVITAMIN WOMEN 50+ PO) Take 1 tablet by mouth daily      HYDROcodone-acetaminophen (NORCO)  MG per tablet TAKE 1 TABLET BY MOUTH EVERY 8 HOURS FOR 30 DAYS      potassium chloride (KLOR-CON M) 10 MEQ extended release tablet Take 1 tablet by mouth in the morning.  90 tablet 1    ibandronate (BONIVA) 150 MG tablet Take 1 tablet by mouth every 30 days 3 tablet 3    ergocalciferol (ERGOCALCIFEROL) 1.25 MG (10446 UT) capsule Take 1 capsule by mouth every 7 days 12 capsule 1    butalbital-acetaminophen-caffeine (FIORICET, ESGIC) -40 MG per tablet Take 1 tablet by mouth every 6 hours as needed      gabapentin (NEURONTIN) 300 MG capsule TAKE 2 CAPSULES BY MOUTH 3 TIMES A DAY      morphine (AVINZA) 30 MG extended release capsule Take 1 capsule by oral route every day          Medications patient taking as of now reconciled against medications ordered at time of hospital discharge: Yes    Review of Systems   Constitutional:  Negative for chills and fever. Respiratory:  Negative for cough and shortness of breath. Cardiovascular:  Negative for chest pain. Gastrointestinal:  Positive for abdominal distention (relieved by bowel movement) and nausea (relieved by bowel movement). Negative for abdominal pain, blood in stool, constipation, diarrhea and vomiting. Genitourinary:  Negative for dysuria and hematuria. Neurological:  Negative for dizziness and headaches. Objective:    /80 (Site: Left Upper Arm, Position: Sitting, Cuff Size: Medium Adult)   Pulse 90   Temp 97 °F (36.1 °C) (Temporal)   Resp 16   Ht 5' 2\" (1.575 m)   Wt 109 lb (49.4 kg)   SpO2 99%   BMI 19.94 kg/m²     Physical Exam  Vitals and nursing note reviewed. Constitutional:       General: She is not in acute distress. Appearance: Normal appearance. Cardiovascular:      Rate and Rhythm: Normal rate and regular rhythm. Pulmonary:      Effort: Pulmonary effort is normal. No respiratory distress. Breath sounds: Normal breath sounds. Abdominal:      General: Bowel sounds are normal. There is no distension. Palpations: Abdomen is soft. Tenderness: There is no abdominal tenderness. There is no guarding or rebound. Musculoskeletal:      Right lower leg: No edema. Left lower leg: No edema. Skin:     General: Skin is warm and dry. Neurological:      Mental Status: She is alert and oriented to person, place, and time. Psychiatric:         Mood and Affect: Mood normal.       An electronic signature was used to authenticate this note.   --Carly Brennan, APRN - CNP

## 2023-02-02 DIAGNOSIS — F41.9 ANXIETY AND DEPRESSION: ICD-10-CM

## 2023-02-02 DIAGNOSIS — F32.A ANXIETY AND DEPRESSION: ICD-10-CM

## 2023-02-02 RX ORDER — BUSPIRONE HYDROCHLORIDE 10 MG/1
10 TABLET ORAL 3 TIMES DAILY
Qty: 30 TABLET | Refills: 3 | Status: SHIPPED | OUTPATIENT
Start: 2023-02-02

## 2023-04-19 DIAGNOSIS — F41.9 ANXIETY AND DEPRESSION: ICD-10-CM

## 2023-04-19 DIAGNOSIS — F32.A ANXIETY AND DEPRESSION: ICD-10-CM

## 2023-04-19 RX ORDER — BUPROPION HYDROCHLORIDE 150 MG/1
150 TABLET ORAL EVERY MORNING
Qty: 90 TABLET | Refills: 0 | Status: SHIPPED | OUTPATIENT
Start: 2023-04-19

## 2023-04-19 RX ORDER — BUSPIRONE HYDROCHLORIDE 10 MG/1
10 TABLET ORAL 3 TIMES DAILY
Qty: 90 TABLET | Refills: 1 | Status: SHIPPED | OUTPATIENT
Start: 2023-04-19

## 2023-04-19 NOTE — TELEPHONE ENCOUNTER
----- Message from [address] sent at 4/18/2023  4:24 PM EDT -----  Subject: Refill Request    QUESTIONS  Name of Medication? buPROPion (WELLBUTRIN XL) 150 MG extended release   tablet  Patient-reported dosage and instructions? 150 mg 1 X daily  How many days do you have left? 0  Preferred Pharmacy? [facility] phone number (if available)? [phone]  ---------------------------------------------------------------------------  --------------,  Name of Medication? busPIRone (BUSPAR) 10 MG tablet  Patient-reported dosage and instructions? 10 mg 1 X by mouth 3X a day  How many days do you have left? 1  Preferred Pharmacy? [facility] phone number (if available)? [phone]  ---------------------------------------------------------------------------  --------------  "[employer]" INFO  What is the best way for the office to contact you? OK to leave message on   voicemail  Preferred Call Back Phone Number? [phone]  ---------------------------------------------------------------------------  --------------  SCRIPT ANSWERS  Relationship to Patient?  Self

## 2023-07-05 ENCOUNTER — COMMUNITY OUTREACH (OUTPATIENT)
Dept: INTERNAL MEDICINE CLINIC | Facility: CLINIC | Age: 63
End: 2023-07-05

## 2023-07-05 NOTE — PROGRESS NOTES
Patient's HM shows they are overdue for Colorectal Screening. Care Everywhere and  files searched.    updated with 2023 colonoscopy

## 2024-01-02 ENCOUNTER — HOSPITAL ENCOUNTER (INPATIENT)
Age: 64
LOS: 2 days | Discharge: HOME | DRG: 812 | End: 2024-01-04
Attending: FAMILY MEDICINE | Admitting: FAMILY MEDICINE
Payer: COMMERCIAL

## 2024-01-02 ENCOUNTER — APPOINTMENT (OUTPATIENT)
Dept: CT IMAGING | Age: 64
End: 2024-01-02
Payer: COMMERCIAL

## 2024-01-02 ENCOUNTER — HOSPITAL ENCOUNTER (EMERGENCY)
Age: 64
Discharge: ANOTHER HOSPITAL | End: 2024-01-02
Attending: EMERGENCY MEDICINE
Payer: COMMERCIAL

## 2024-01-02 VITALS
HEIGHT: 62 IN | RESPIRATION RATE: 14 BRPM | DIASTOLIC BLOOD PRESSURE: 82 MMHG | OXYGEN SATURATION: 92 % | WEIGHT: 134.1 LBS | HEART RATE: 77 BPM | BODY MASS INDEX: 24.68 KG/M2 | SYSTOLIC BLOOD PRESSURE: 110 MMHG

## 2024-01-02 DIAGNOSIS — G43.009 MIGRAINE WITHOUT AURA AND WITHOUT STATUS MIGRAINOSUS, NOT INTRACTABLE: ICD-10-CM

## 2024-01-02 DIAGNOSIS — Z86.73 HISTORY OF CVA (CEREBROVASCULAR ACCIDENT): ICD-10-CM

## 2024-01-02 DIAGNOSIS — D50.8 OTHER IRON DEFICIENCY ANEMIA: ICD-10-CM

## 2024-01-02 DIAGNOSIS — R42 DIZZINESS: Primary | ICD-10-CM

## 2024-01-02 DIAGNOSIS — Z98.84 HISTORY OF GASTRIC BYPASS: ICD-10-CM

## 2024-01-02 DIAGNOSIS — R27.0 ATAXIA: ICD-10-CM

## 2024-01-02 LAB
ALBUMIN SERPL-MCNC: 4 G/DL (ref 3.2–4.6)
ALBUMIN/GLOB SERPL: 1.2 (ref 0.4–1.6)
ALP SERPL-CCNC: 84 U/L (ref 45–117)
ALT SERPL-CCNC: 6 U/L (ref 13–61)
ANION GAP SERPL CALC-SCNC: 12 MMOL/L (ref 2–11)
AST SERPL-CCNC: 13 U/L (ref 15–37)
BASOPHILS # BLD: 0.1 K/UL (ref 0–0.2)
BASOPHILS NFR BLD: 2 % (ref 0–2)
BILIRUB SERPL-MCNC: 0.3 MG/DL (ref 0.2–1.1)
BUN SERPL-MCNC: 11 MG/DL (ref 8–23)
CALCIUM SERPL-MCNC: 9.3 MG/DL (ref 8.3–10.4)
CHLORIDE SERPL-SCNC: 105 MMOL/L (ref 98–107)
CO2 SERPL-SCNC: 26 MMOL/L (ref 21–32)
CREAT SERPL-MCNC: 0.67 MG/DL (ref 0.6–1)
DIFFERENTIAL METHOD BLD: ABNORMAL
EKG ATRIAL RATE: 98 BPM
EKG DIAGNOSIS: NORMAL
EKG P AXIS: 98 DEGREES
EKG P-R INTERVAL: 153 MS
EKG Q-T INTERVAL: 393 MS
EKG QRS DURATION: 95 MS
EKG QTC CALCULATION (BAZETT): 500 MS
EKG R AXIS: 51 DEGREES
EKG T AXIS: 70 DEGREES
EKG VENTRICULAR RATE: 97 BPM
EOSINOPHIL # BLD: 0.1 K/UL (ref 0–0.8)
EOSINOPHIL NFR BLD: 1 % (ref 0.5–7.8)
ERYTHROCYTE [DISTWIDTH] IN BLOOD BY AUTOMATED COUNT: 15.6 % (ref 11.9–14.6)
GLOBULIN SER CALC-MCNC: 3.4 G/DL (ref 2.8–4.5)
GLUCOSE BLD STRIP.AUTO-MCNC: 144 MG/DL (ref 65–100)
GLUCOSE BLD-MCNC: 144 MG/DL
GLUCOSE SERPL-MCNC: 133 MG/DL (ref 65–100)
HCT VFR BLD AUTO: 29.8 % (ref 35.8–46.3)
HEMOCCULT STL QL: NEGATIVE
HGB BLD-MCNC: 8.4 G/DL (ref 11.7–15.4)
IMM GRANULOCYTES # BLD AUTO: 0 K/UL (ref 0–0.5)
IMM GRANULOCYTES NFR BLD AUTO: 0 % (ref 0–5)
INR PPP: 1
LYMPHOCYTES # BLD: 2.2 K/UL (ref 0.5–4.6)
LYMPHOCYTES NFR BLD: 35 % (ref 13–44)
MAGNESIUM SERPL-MCNC: 2.2 MG/DL (ref 1.2–2.6)
MCH RBC QN AUTO: 20.2 PG (ref 26.1–32.9)
MCHC RBC AUTO-ENTMCNC: 28.2 G/DL (ref 31.4–35)
MCV RBC AUTO: 71.6 FL (ref 82–102)
MONOCYTES # BLD: 0.7 K/UL (ref 0.1–1.3)
MONOCYTES NFR BLD: 12 % (ref 4–12)
NEUTS SEG # BLD: 3.2 K/UL (ref 1.7–8.2)
NEUTS SEG NFR BLD: 51 % (ref 43–78)
NRBC # BLD: 0 K/UL (ref 0–0.2)
PLATELET # BLD AUTO: 440 K/UL (ref 150–450)
PMV BLD AUTO: 9.3 FL (ref 9.4–12.3)
POTASSIUM SERPL-SCNC: 3 MMOL/L (ref 3.5–5.1)
PROT SERPL-MCNC: 7.4 G/DL (ref 6.4–8.2)
PROTHROMBIN TIME: 13 SEC (ref 11.3–14.9)
RBC # BLD AUTO: 4.16 M/UL (ref 4.05–5.2)
SERVICE CMNT-IMP: ABNORMAL
SODIUM SERPL-SCNC: 143 MMOL/L (ref 133–143)
WBC # BLD AUTO: 6.3 K/UL (ref 4.3–11.1)

## 2024-01-02 PROCEDURE — 6360000004 HC RX CONTRAST MEDICATION: Performed by: EMERGENCY MEDICINE

## 2024-01-02 PROCEDURE — 96374 THER/PROPH/DIAG INJ IV PUSH: CPT

## 2024-01-02 PROCEDURE — 93010 ELECTROCARDIOGRAM REPORT: CPT | Performed by: INTERNAL MEDICINE

## 2024-01-02 PROCEDURE — 70450 CT HEAD/BRAIN W/O DYE: CPT

## 2024-01-02 PROCEDURE — 70498 CT ANGIOGRAPHY NECK: CPT

## 2024-01-02 PROCEDURE — 1100000003 HC PRIVATE W/ TELEMETRY

## 2024-01-02 PROCEDURE — 93005 ELECTROCARDIOGRAM TRACING: CPT | Performed by: EMERGENCY MEDICINE

## 2024-01-02 PROCEDURE — G0378 HOSPITAL OBSERVATION PER HR: HCPCS

## 2024-01-02 PROCEDURE — 6370000000 HC RX 637 (ALT 250 FOR IP): Performed by: EMERGENCY MEDICINE

## 2024-01-02 PROCEDURE — 80053 COMPREHEN METABOLIC PANEL: CPT

## 2024-01-02 PROCEDURE — 96375 TX/PRO/DX INJ NEW DRUG ADDON: CPT

## 2024-01-02 PROCEDURE — 82270 OCCULT BLOOD FECES: CPT

## 2024-01-02 PROCEDURE — 83735 ASSAY OF MAGNESIUM: CPT

## 2024-01-02 PROCEDURE — 70496 CT ANGIOGRAPHY HEAD: CPT | Performed by: RADIOLOGY

## 2024-01-02 PROCEDURE — 6370000000 HC RX 637 (ALT 250 FOR IP): Performed by: FAMILY MEDICINE

## 2024-01-02 PROCEDURE — G0379 DIRECT REFER HOSPITAL OBSERV: HCPCS

## 2024-01-02 PROCEDURE — 2580000003 HC RX 258: Performed by: FAMILY MEDICINE

## 2024-01-02 PROCEDURE — 85610 PROTHROMBIN TIME: CPT

## 2024-01-02 PROCEDURE — 70498 CT ANGIOGRAPHY NECK: CPT | Performed by: RADIOLOGY

## 2024-01-02 PROCEDURE — 6360000002 HC RX W HCPCS: Performed by: EMERGENCY MEDICINE

## 2024-01-02 PROCEDURE — 85025 COMPLETE CBC W/AUTO DIFF WBC: CPT

## 2024-01-02 PROCEDURE — 82962 GLUCOSE BLOOD TEST: CPT

## 2024-01-02 PROCEDURE — 99285 EMERGENCY DEPT VISIT HI MDM: CPT

## 2024-01-02 RX ORDER — POLYETHYLENE GLYCOL 3350 17 G/17G
17 POWDER, FOR SOLUTION ORAL DAILY PRN
Status: DISCONTINUED | OUTPATIENT
Start: 2024-01-02 | End: 2024-01-04 | Stop reason: HOSPADM

## 2024-01-02 RX ORDER — ACETAMINOPHEN 650 MG/1
650 SUPPOSITORY RECTAL EVERY 4 HOURS PRN
Status: DISCONTINUED | OUTPATIENT
Start: 2024-01-02 | End: 2024-01-04 | Stop reason: HOSPADM

## 2024-01-02 RX ORDER — BUPROPION HYDROCHLORIDE 300 MG/1
300 TABLET ORAL EVERY MORNING
Status: DISCONTINUED | OUTPATIENT
Start: 2024-01-03 | End: 2024-01-04 | Stop reason: HOSPADM

## 2024-01-02 RX ORDER — PANTOPRAZOLE SODIUM 40 MG/1
40 TABLET, DELAYED RELEASE ORAL
Status: DISCONTINUED | OUTPATIENT
Start: 2024-01-03 | End: 2024-01-02

## 2024-01-02 RX ORDER — SODIUM CHLORIDE 0.9 % (FLUSH) 0.9 %
5-40 SYRINGE (ML) INJECTION PRN
Status: DISCONTINUED | OUTPATIENT
Start: 2024-01-02 | End: 2024-01-04 | Stop reason: HOSPADM

## 2024-01-02 RX ORDER — ACETAMINOPHEN 325 MG/1
650 TABLET ORAL EVERY 4 HOURS PRN
Status: DISCONTINUED | OUTPATIENT
Start: 2024-01-02 | End: 2024-01-04 | Stop reason: HOSPADM

## 2024-01-02 RX ORDER — OXYCODONE AND ACETAMINOPHEN 10; 325 MG/1; MG/1
TABLET ORAL
COMMUNITY
Start: 2023-01-13

## 2024-01-02 RX ORDER — BUTALBITAL, ACETAMINOPHEN AND CAFFEINE 50; 325; 40 MG/1; MG/1; MG/1
1 TABLET ORAL EVERY 6 HOURS PRN
Status: DISCONTINUED | OUTPATIENT
Start: 2024-01-02 | End: 2024-01-04 | Stop reason: HOSPADM

## 2024-01-02 RX ORDER — BUSPIRONE HYDROCHLORIDE 10 MG/1
10 TABLET ORAL 3 TIMES DAILY
Status: DISCONTINUED | OUTPATIENT
Start: 2024-01-02 | End: 2024-01-04 | Stop reason: HOSPADM

## 2024-01-02 RX ORDER — ASPIRIN 81 MG/1
81 TABLET, CHEWABLE ORAL DAILY
Status: DISCONTINUED | OUTPATIENT
Start: 2024-01-03 | End: 2024-01-04 | Stop reason: HOSPADM

## 2024-01-02 RX ORDER — ASPIRIN 300 MG/1
300 SUPPOSITORY RECTAL DAILY
Status: DISCONTINUED | OUTPATIENT
Start: 2024-01-03 | End: 2024-01-04 | Stop reason: HOSPADM

## 2024-01-02 RX ORDER — BISACODYL 10 MG
10 SUPPOSITORY, RECTAL RECTAL DAILY PRN
Status: DISCONTINUED | OUTPATIENT
Start: 2024-01-02 | End: 2024-01-04 | Stop reason: HOSPADM

## 2024-01-02 RX ORDER — POTASSIUM CHLORIDE 20 MEQ/1
10 TABLET, EXTENDED RELEASE ORAL DAILY
Status: DISCONTINUED | OUTPATIENT
Start: 2024-01-03 | End: 2024-01-04 | Stop reason: HOSPADM

## 2024-01-02 RX ORDER — METOCLOPRAMIDE HYDROCHLORIDE 5 MG/ML
5 INJECTION INTRAMUSCULAR; INTRAVENOUS
Status: COMPLETED | OUTPATIENT
Start: 2024-01-02 | End: 2024-01-02

## 2024-01-02 RX ORDER — GABAPENTIN 400 MG/1
800 CAPSULE ORAL 3 TIMES DAILY
Status: DISCONTINUED | OUTPATIENT
Start: 2024-01-02 | End: 2024-01-04 | Stop reason: HOSPADM

## 2024-01-02 RX ORDER — POTASSIUM CHLORIDE 20 MEQ/1
40 TABLET, EXTENDED RELEASE ORAL ONCE
Status: COMPLETED | OUTPATIENT
Start: 2024-01-02 | End: 2024-01-02

## 2024-01-02 RX ORDER — SODIUM CHLORIDE 9 MG/ML
INJECTION, SOLUTION INTRAVENOUS PRN
Status: DISCONTINUED | OUTPATIENT
Start: 2024-01-02 | End: 2024-01-04 | Stop reason: HOSPADM

## 2024-01-02 RX ORDER — OXYCODONE HYDROCHLORIDE 5 MG/1
10 TABLET ORAL EVERY 6 HOURS PRN
Status: DISCONTINUED | OUTPATIENT
Start: 2024-01-02 | End: 2024-01-04 | Stop reason: HOSPADM

## 2024-01-02 RX ORDER — ROPINIROLE 1 MG/1
1 TABLET, FILM COATED ORAL NIGHTLY
Status: DISCONTINUED | OUTPATIENT
Start: 2024-01-02 | End: 2024-01-04 | Stop reason: HOSPADM

## 2024-01-02 RX ORDER — ROSUVASTATIN CALCIUM 20 MG/1
40 TABLET, COATED ORAL NIGHTLY
Status: DISCONTINUED | OUTPATIENT
Start: 2024-01-02 | End: 2024-01-04 | Stop reason: HOSPADM

## 2024-01-02 RX ORDER — DIPHENHYDRAMINE HYDROCHLORIDE 50 MG/ML
25 INJECTION INTRAMUSCULAR; INTRAVENOUS
Status: COMPLETED | OUTPATIENT
Start: 2024-01-02 | End: 2024-01-02

## 2024-01-02 RX ORDER — SODIUM CHLORIDE 0.9 % (FLUSH) 0.9 %
5-40 SYRINGE (ML) INJECTION EVERY 12 HOURS SCHEDULED
Status: DISCONTINUED | OUTPATIENT
Start: 2024-01-02 | End: 2024-01-04 | Stop reason: HOSPADM

## 2024-01-02 RX ADMIN — SODIUM CHLORIDE, PRESERVATIVE FREE 10 ML: 5 INJECTION INTRAVENOUS at 23:58

## 2024-01-02 RX ADMIN — POTASSIUM CHLORIDE 40 MEQ: 1500 TABLET, EXTENDED RELEASE ORAL at 16:58

## 2024-01-02 RX ADMIN — OXYCODONE HYDROCHLORIDE 10 MG: 5 TABLET ORAL at 23:57

## 2024-01-02 RX ADMIN — IOPAMIDOL 60 ML: 755 INJECTION, SOLUTION INTRAVENOUS at 16:32

## 2024-01-02 RX ADMIN — METOCLOPRAMIDE HYDROCHLORIDE 5 MG: 5 INJECTION INTRAMUSCULAR; INTRAVENOUS at 18:17

## 2024-01-02 RX ADMIN — DIPHENHYDRAMINE HYDROCHLORIDE 25 MG: 50 INJECTION INTRAMUSCULAR; INTRAVENOUS at 18:17

## 2024-01-02 RX ADMIN — ROSUVASTATIN CALCIUM 40 MG: 20 TABLET, FILM COATED ORAL at 23:57

## 2024-01-02 RX ADMIN — BUSPIRONE HYDROCHLORIDE 10 MG: 10 TABLET ORAL at 23:57

## 2024-01-02 RX ADMIN — ROPINIROLE HYDROCHLORIDE 1 MG: 1 TABLET, FILM COATED ORAL at 23:57

## 2024-01-02 RX ADMIN — GABAPENTIN 800 MG: 400 CAPSULE ORAL at 23:57

## 2024-01-02 ASSESSMENT — LIFESTYLE VARIABLES
HOW OFTEN DO YOU HAVE A DRINK CONTAINING ALCOHOL: NEVER
HOW MANY STANDARD DRINKS CONTAINING ALCOHOL DO YOU HAVE ON A TYPICAL DAY: PATIENT DOES NOT DRINK

## 2024-01-02 ASSESSMENT — PAIN SCALES - GENERAL: PAINLEVEL_OUTOF10: 8

## 2024-01-02 ASSESSMENT — ENCOUNTER SYMPTOMS
ABDOMINAL PAIN: 0
NAUSEA: 0
COUGH: 0
DIARRHEA: 0
SHORTNESS OF BREATH: 0
RHINORRHEA: 0
VOMITING: 0
COLOR CHANGE: 0
BACK PAIN: 0

## 2024-01-02 ASSESSMENT — PAIN DESCRIPTION - PAIN TYPE: TYPE: CHRONIC PAIN

## 2024-01-02 ASSESSMENT — PAIN DESCRIPTION - FREQUENCY: FREQUENCY: INTERMITTENT

## 2024-01-02 ASSESSMENT — PAIN DESCRIPTION - ORIENTATION: ORIENTATION: MID

## 2024-01-02 ASSESSMENT — PAIN - FUNCTIONAL ASSESSMENT
PAIN_FUNCTIONAL_ASSESSMENT: NONE - DENIES PAIN
PAIN_FUNCTIONAL_ASSESSMENT: PREVENTS OR INTERFERES SOME ACTIVE ACTIVITIES AND ADLS

## 2024-01-02 ASSESSMENT — PAIN DESCRIPTION - DESCRIPTORS: DESCRIPTORS: ACHING

## 2024-01-02 ASSESSMENT — PAIN DESCRIPTION - ONSET: ONSET: GRADUAL

## 2024-01-02 ASSESSMENT — PAIN DESCRIPTION - LOCATION: LOCATION: HEAD

## 2024-01-02 NOTE — ED TRIAGE NOTES
States yesterday at about 12-1pm she got dizzy and \"felt like floor was moving\". States this lasted rest of day, states when she woke up today she had headache, and states \"uncoordinated\" when walking. Denies numbness tingling or one sided weakness, states some confusion yesterday at same time as dizziness. Family hx of stroke.

## 2024-01-02 NOTE — ED NOTES
Pt asked to home medications for chronic pain Gabapentin 800mg and percocet 10mg. MD stated that was okay. Pt provided water for medication administration.

## 2024-01-02 NOTE — ED PROVIDER NOTES
Emergency Department Provider Note       PCP: Alisia Loyola DO   Age: 63 y.o.   Sex: female     DISPOSITION Decision To Transfer 01/02/2024 06:04:00 PM       ICD-10-CM    1. Dizziness  R42       2. Ataxia  R27.0       3. Migraine without aura and without status migrainosus, not intractable  G43.009           Medical Decision Making     Complexity of Problems Addressed:  1 or more acute illnesses that pose a threat to life or bodily function.     Data Reviewed and Analyzed:   I independently ordered and reviewed each unique test.  I reviewed external records: provider visit note from PCP.       I independently ordered and interpreted the ED EKG in the absence of a Cardiologist.    Rate: 97  EKG Interpretation: EKG Interpretation: sinus rhythm, no evidence of arrhythmia and non-specific EKG  ST Segments: ST segment depression - NO STEMI   Borderline prolonged QT interval nonspecific ST depression in V2 and V3  I interpreted the CT Scan old lacunar infact, no bleed.    Discussion of management or test interpretation.  Patient out of the 4-1/2-hour window for tPA in the 24-hour window for intervention. Current NIH 0.  The patient was admitted and I have discussed patient management with the admitting provider.    Risk of Complications and/or Morbidity of Patient Management:  Shared medical decision making was utilized in creating the patients health plan today.         Is this patient to be included in the SEP-1 core measure due to severe sepsis or septic shock? No Exclusion criteria - the patient is NOT to be included for SEP-1 Core Measure due to: Infection is not suspected      History      63-year-old female presents with complaint of dizziness and confusion onset yesterday at 12:30 PM.  Headache onset at that time but not the worst of her life.  Symptoms were constant for 4 to 4-1/2 hours and have improved but are not 100% resolved.  She had difficulty comprehending the keys on her keys board yesterday while at

## 2024-01-03 ENCOUNTER — APPOINTMENT (OUTPATIENT)
Dept: MRI IMAGING | Age: 64
DRG: 812 | End: 2024-01-03
Attending: FAMILY MEDICINE
Payer: COMMERCIAL

## 2024-01-03 ENCOUNTER — APPOINTMENT (OUTPATIENT)
Dept: NON INVASIVE DIAGNOSTICS | Age: 64
DRG: 812 | End: 2024-01-03
Attending: STUDENT IN AN ORGANIZED HEALTH CARE EDUCATION/TRAINING PROGRAM
Payer: COMMERCIAL

## 2024-01-03 LAB
ANION GAP SERPL CALC-SCNC: 3 MMOL/L (ref 2–11)
BUN SERPL-MCNC: 11 MG/DL (ref 8–23)
CALCIUM SERPL-MCNC: 8.2 MG/DL (ref 8.3–10.4)
CHLORIDE SERPL-SCNC: 111 MMOL/L (ref 103–113)
CHOLEST SERPL-MCNC: 155 MG/DL
CO2 SERPL-SCNC: 27 MMOL/L (ref 21–32)
CREAT SERPL-MCNC: 0.7 MG/DL (ref 0.6–1)
ECHO AO ROOT DIAM: 3.3 CM
ECHO AO ROOT INDEX: 2.05 CM/M2
ECHO AV AREA PEAK VELOCITY: 2 CM2
ECHO AV AREA VTI: 1.9 CM2
ECHO AV AREA/BSA PEAK VELOCITY: 1.2 CM2/M2
ECHO AV AREA/BSA VTI: 1.2 CM2/M2
ECHO AV MEAN GRADIENT: 5 MMHG
ECHO AV MEAN VELOCITY: 1 M/S
ECHO AV PEAK GRADIENT: 9 MMHG
ECHO AV PEAK VELOCITY: 1.5 M/S
ECHO AV VELOCITY RATIO: 0.73
ECHO AV VTI: 31.3 CM
ECHO BSA: 1.63 M2
ECHO EST RA PRESSURE: 8 MMHG
ECHO IVC PROX: 2.2 CM
ECHO LA AREA 2C: 18.8 CM2
ECHO LA AREA 4C: 16.9 CM2
ECHO LA DIAMETER INDEX: 1.93 CM/M2
ECHO LA DIAMETER: 3.1 CM
ECHO LA MAJOR AXIS: 5.6 CM
ECHO LA MINOR AXIS: 5 CM
ECHO LA TO AORTIC ROOT RATIO: 0.94
ECHO LA VOL BP: 50 ML (ref 22–52)
ECHO LA VOL MOD A2C: 57 ML (ref 22–52)
ECHO LA VOL MOD A4C: 40 ML (ref 22–52)
ECHO LA VOL/BSA BIPLANE: 31 ML/M2 (ref 16–34)
ECHO LA VOLUME INDEX MOD A2C: 35 ML/M2 (ref 16–34)
ECHO LA VOLUME INDEX MOD A4C: 25 ML/M2 (ref 16–34)
ECHO LV E' LATERAL VELOCITY: 12 CM/S
ECHO LV E' SEPTAL VELOCITY: 10 CM/S
ECHO LV EDV A2C: 77 ML
ECHO LV EDV A4C: 99 ML
ECHO LV EDV INDEX A4C: 61 ML/M2
ECHO LV EDV NDEX A2C: 48 ML/M2
ECHO LV EJECTION FRACTION A2C: 56 %
ECHO LV EJECTION FRACTION A4C: 55 %
ECHO LV EJECTION FRACTION BIPLANE: 55 % (ref 55–100)
ECHO LV ESV A2C: 34 ML
ECHO LV ESV A4C: 44 ML
ECHO LV ESV INDEX A2C: 21 ML/M2
ECHO LV ESV INDEX A4C: 27 ML/M2
ECHO LV FRACTIONAL SHORTENING: 26 % (ref 28–44)
ECHO LV INTERNAL DIMENSION DIASTOLE INDEX: 2.67 CM/M2
ECHO LV INTERNAL DIMENSION DIASTOLIC: 4.3 CM (ref 3.9–5.3)
ECHO LV INTERNAL DIMENSION SYSTOLIC INDEX: 1.99 CM/M2
ECHO LV INTERNAL DIMENSION SYSTOLIC: 3.2 CM
ECHO LV IVSD: 0.8 CM (ref 0.6–0.9)
ECHO LV MASS 2D: 96.8 G (ref 67–162)
ECHO LV MASS INDEX 2D: 60.1 G/M2 (ref 43–95)
ECHO LV POSTERIOR WALL DIASTOLIC: 0.7 CM (ref 0.6–0.9)
ECHO LV RELATIVE WALL THICKNESS RATIO: 0.33
ECHO LVOT AREA: 2.8 CM2
ECHO LVOT AV VTI INDEX: 0.66
ECHO LVOT DIAM: 1.9 CM
ECHO LVOT MEAN GRADIENT: 2 MMHG
ECHO LVOT PEAK GRADIENT: 4 MMHG
ECHO LVOT PEAK VELOCITY: 1.1 M/S
ECHO LVOT STROKE VOLUME INDEX: 36.6 ML/M2
ECHO LVOT SV: 58.9 ML
ECHO LVOT VTI: 20.8 CM
ECHO MV A VELOCITY: 0.96 M/S
ECHO MV E DECELERATION TIME (DT): 248 MS
ECHO MV E VELOCITY: 0.72 M/S
ECHO MV E/A RATIO: 0.75
ECHO MV E/E' LATERAL: 6
ECHO MV E/E' RATIO (AVERAGED): 6.6
ECHO PV ACCELERATION TIME (AT): 121 MS
ECHO PV MAX VELOCITY: 0.9 M/S
ECHO PV PEAK GRADIENT: 3 MMHG
ECHO RV BASAL DIMENSION: 3.1 CM
ECHO RV FREE WALL PEAK S': 13 CM/S
ECHO RV TAPSE: 2.4 CM (ref 1.7–?)
ERYTHROCYTE [DISTWIDTH] IN BLOOD BY AUTOMATED COUNT: 15.7 % (ref 11.9–14.6)
EST. AVERAGE GLUCOSE BLD GHB EST-MCNC: 105 MG/DL
FERRITIN SERPL-MCNC: 5 NG/ML (ref 8–388)
GLUCOSE SERPL-MCNC: 92 MG/DL (ref 65–100)
HBA1C MFR BLD: 5.3 % (ref 4.8–5.6)
HCT VFR BLD AUTO: 26.6 % (ref 35.8–46.3)
HDLC SERPL-MCNC: 63 MG/DL (ref 40–60)
HDLC SERPL: 2.5
HGB BLD-MCNC: 7.3 G/DL (ref 11.7–15.4)
HGB BLD-MCNC: 7.6 G/DL (ref 11.7–15.4)
IRON SATN MFR SERPL: 3 %
IRON SERPL-MCNC: 13 UG/DL (ref 35–150)
LDLC SERPL CALC-MCNC: 75.4 MG/DL
MAGNESIUM SERPL-MCNC: 2.7 MG/DL (ref 1.8–2.4)
MCH RBC QN AUTO: 19.9 PG (ref 26.1–32.9)
MCHC RBC AUTO-ENTMCNC: 27.4 G/DL (ref 31.4–35)
MCV RBC AUTO: 72.7 FL (ref 82–102)
NRBC # BLD: 0 K/UL (ref 0–0.2)
PLATELET # BLD AUTO: 379 K/UL (ref 150–450)
PMV BLD AUTO: 9.8 FL (ref 9.4–12.3)
POTASSIUM SERPL-SCNC: 3.5 MMOL/L (ref 3.5–5.1)
RBC # BLD AUTO: 3.66 M/UL (ref 4.05–5.2)
SODIUM SERPL-SCNC: 141 MMOL/L (ref 136–146)
TIBC SERPL-MCNC: 423 UG/DL (ref 250–450)
TRIGL SERPL-MCNC: 83 MG/DL (ref 35–150)
TSH W FREE THYROID IF ABNORMAL: 0.81 UIU/ML (ref 0.36–3.74)
VLDLC SERPL CALC-MCNC: 16.6 MG/DL (ref 6–23)
WBC # BLD AUTO: 6 K/UL (ref 4.3–11.1)

## 2024-01-03 PROCEDURE — 70551 MRI BRAIN STEM W/O DYE: CPT

## 2024-01-03 PROCEDURE — 2580000003 HC RX 258: Performed by: FAMILY MEDICINE

## 2024-01-03 PROCEDURE — 85018 HEMOGLOBIN: CPT

## 2024-01-03 PROCEDURE — 80061 LIPID PANEL: CPT

## 2024-01-03 PROCEDURE — 97162 PT EVAL MOD COMPLEX 30 MIN: CPT

## 2024-01-03 PROCEDURE — 92610 EVALUATE SWALLOWING FUNCTION: CPT

## 2024-01-03 PROCEDURE — 97116 GAIT TRAINING THERAPY: CPT

## 2024-01-03 PROCEDURE — 6370000000 HC RX 637 (ALT 250 FOR IP): Performed by: FAMILY MEDICINE

## 2024-01-03 PROCEDURE — 97530 THERAPEUTIC ACTIVITIES: CPT

## 2024-01-03 PROCEDURE — 85027 COMPLETE CBC AUTOMATED: CPT

## 2024-01-03 PROCEDURE — 83735 ASSAY OF MAGNESIUM: CPT

## 2024-01-03 PROCEDURE — 83550 IRON BINDING TEST: CPT

## 2024-01-03 PROCEDURE — G0378 HOSPITAL OBSERVATION PER HR: HCPCS

## 2024-01-03 PROCEDURE — 36415 COLL VENOUS BLD VENIPUNCTURE: CPT

## 2024-01-03 PROCEDURE — 93306 TTE W/DOPPLER COMPLETE: CPT

## 2024-01-03 PROCEDURE — 84443 ASSAY THYROID STIM HORMONE: CPT

## 2024-01-03 PROCEDURE — 1100000003 HC PRIVATE W/ TELEMETRY

## 2024-01-03 PROCEDURE — 83036 HEMOGLOBIN GLYCOSYLATED A1C: CPT

## 2024-01-03 PROCEDURE — 6360000004 HC RX CONTRAST MEDICATION: Performed by: STUDENT IN AN ORGANIZED HEALTH CARE EDUCATION/TRAINING PROGRAM

## 2024-01-03 PROCEDURE — 82728 ASSAY OF FERRITIN: CPT

## 2024-01-03 PROCEDURE — A9579 GAD-BASE MR CONTRAST NOS,1ML: HCPCS | Performed by: STUDENT IN AN ORGANIZED HEALTH CARE EDUCATION/TRAINING PROGRAM

## 2024-01-03 PROCEDURE — 72156 MRI NECK SPINE W/O & W/DYE: CPT

## 2024-01-03 PROCEDURE — 97165 OT EVAL LOW COMPLEX 30 MIN: CPT

## 2024-01-03 PROCEDURE — 80048 BASIC METABOLIC PNL TOTAL CA: CPT

## 2024-01-03 PROCEDURE — 83540 ASSAY OF IRON: CPT

## 2024-01-03 RX ORDER — FERROUS SULFATE 325(65) MG
325 TABLET ORAL
Status: DISCONTINUED | OUTPATIENT
Start: 2024-01-04 | End: 2024-01-04 | Stop reason: HOSPADM

## 2024-01-03 RX ORDER — ACETAMINOPHEN 500 MG
1000 TABLET ORAL ONCE
Status: COMPLETED | OUTPATIENT
Start: 2024-01-03 | End: 2024-01-03

## 2024-01-03 RX ADMIN — GABAPENTIN 800 MG: 400 CAPSULE ORAL at 09:18

## 2024-01-03 RX ADMIN — POTASSIUM CHLORIDE 10 MEQ: 1500 TABLET, EXTENDED RELEASE ORAL at 09:18

## 2024-01-03 RX ADMIN — BUTALBITAL, ACETAMINOPHEN, AND CAFFEINE 1 TABLET: 50; 325; 40 TABLET ORAL at 03:34

## 2024-01-03 RX ADMIN — ROSUVASTATIN CALCIUM 40 MG: 20 TABLET, FILM COATED ORAL at 21:01

## 2024-01-03 RX ADMIN — SODIUM CHLORIDE, PRESERVATIVE FREE 5 ML: 5 INJECTION INTRAVENOUS at 21:02

## 2024-01-03 RX ADMIN — ACETAMINOPHEN 1000 MG: 500 TABLET ORAL at 05:13

## 2024-01-03 RX ADMIN — BUSPIRONE HYDROCHLORIDE 10 MG: 10 TABLET ORAL at 13:33

## 2024-01-03 RX ADMIN — GABAPENTIN 800 MG: 400 CAPSULE ORAL at 13:33

## 2024-01-03 RX ADMIN — SODIUM CHLORIDE, PRESERVATIVE FREE 10 ML: 5 INJECTION INTRAVENOUS at 09:18

## 2024-01-03 RX ADMIN — BUPROPION HYDROCHLORIDE 300 MG: 300 TABLET, EXTENDED RELEASE ORAL at 09:18

## 2024-01-03 RX ADMIN — BUSPIRONE HYDROCHLORIDE 10 MG: 10 TABLET ORAL at 09:18

## 2024-01-03 RX ADMIN — BUSPIRONE HYDROCHLORIDE 10 MG: 10 TABLET ORAL at 21:01

## 2024-01-03 RX ADMIN — SODIUM CHLORIDE, PRESERVATIVE FREE 10 ML: 5 INJECTION INTRAVENOUS at 16:02

## 2024-01-03 RX ADMIN — ACETAMINOPHEN 650 MG: 325 TABLET ORAL at 09:41

## 2024-01-03 RX ADMIN — OXYCODONE HYDROCHLORIDE 10 MG: 5 TABLET ORAL at 19:32

## 2024-01-03 RX ADMIN — ASPIRIN 81 MG: 81 TABLET, CHEWABLE ORAL at 09:17

## 2024-01-03 RX ADMIN — OXYCODONE HYDROCHLORIDE 10 MG: 5 TABLET ORAL at 06:23

## 2024-01-03 RX ADMIN — GABAPENTIN 800 MG: 400 CAPSULE ORAL at 21:01

## 2024-01-03 RX ADMIN — ROPINIROLE HYDROCHLORIDE 1 MG: 1 TABLET, FILM COATED ORAL at 21:01

## 2024-01-03 RX ADMIN — GADOTERIDOL 12 ML: 279.3 INJECTION, SOLUTION INTRAVENOUS at 16:02

## 2024-01-03 ASSESSMENT — PAIN SCALES - GENERAL
PAINLEVEL_OUTOF10: 0
PAINLEVEL_OUTOF10: 8
PAINLEVEL_OUTOF10: 0
PAINLEVEL_OUTOF10: 8
PAINLEVEL_OUTOF10: 0
PAINLEVEL_OUTOF10: 0
PAINLEVEL_OUTOF10: 8

## 2024-01-03 ASSESSMENT — PAIN DESCRIPTION - DESCRIPTORS
DESCRIPTORS: ACHING
DESCRIPTORS: ACHING
DESCRIPTORS: ACHING;THROBBING

## 2024-01-03 ASSESSMENT — PAIN - FUNCTIONAL ASSESSMENT
PAIN_FUNCTIONAL_ASSESSMENT: PREVENTS OR INTERFERES SOME ACTIVE ACTIVITIES AND ADLS
PAIN_FUNCTIONAL_ASSESSMENT: PREVENTS OR INTERFERES SOME ACTIVE ACTIVITIES AND ADLS

## 2024-01-03 ASSESSMENT — PAIN DESCRIPTION - ONSET
ONSET: GRADUAL
ONSET: GRADUAL

## 2024-01-03 ASSESSMENT — PAIN DESCRIPTION - LOCATION
LOCATION: HEAD

## 2024-01-03 ASSESSMENT — PAIN DESCRIPTION - ORIENTATION
ORIENTATION: MID
ORIENTATION: MID
ORIENTATION: INNER

## 2024-01-03 ASSESSMENT — PAIN DESCRIPTION - PAIN TYPE
TYPE: CHRONIC PAIN
TYPE: CHRONIC PAIN

## 2024-01-03 ASSESSMENT — PAIN DESCRIPTION - FREQUENCY
FREQUENCY: INTERMITTENT
FREQUENCY: INTERMITTENT

## 2024-01-03 NOTE — PROGRESS NOTES
ACUTE PHYSICAL THERAPY GOALS:   (Developed with and agreed upon by patient and/or caregiver.)   Ms. Cosme will perform all transfers independently in 4 days.    Ms. Cosme will perform gait with good balance >200 ft independently in 4 days.     PHYSICAL THERAPY Initial Assessment, Daily Note, and AM  (Link to Caseload Tracking: PT Visit Days : 1  Acknowledge Orders  Time In/Out  PT Charge Capture  Rehab Caseload Tracker    Nai Cosme is a 63 y.o. female   PRIMARY DIAGNOSIS: Dizziness  Dizziness [R42]       Reason for Referral: Generalized Muscle Weakness (M62.81)  Difficulty in walking, Not elsewhere classified (R26.2)  Dizziness and Giddiness (R42)  Inpatient: Payor: [employer] NINO / Plan: [employer] NINO / Product Type: *No Product type* /     ASSESSMENT:     REHAB RECOMMENDATIONS:   Recommendation to date pending progress:  Setting:  Outpatient Therapy    Equipment:    To Be Determined  She does have a rolling walker      ASSESSMENT:  Ms. Cosme presents with primary ataxia after episode yesterday consisting of dizziness and confusion.  She tells me she had a similar episode a month ago but symptoms resolved in an hour so she didn't think anything of it.  She lives with her boyfriend who is a .  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  She is independent.  Has chronic back pain from back surgery for which she takes pain medicine.    Today she demonstrates good strength and normal sensation.  She admits to feeling \"wobbly\" and she is when she gets up.  Provided cg.  Ataxic, gave her a walker and although it helped some still a bit ataxic.  She never needed more than contact guard.  She has been getting up to the bathroom with supervision.  Her boyfriend is on his way here.  Suggested she keep getting up with supervision.  Her boyfriend will be off for a week so she will have supervision at home.  She is functioning below baseline and is therefore  appropriate for skilled PT to maximize her rehab potential.  If her symptoms do not resolve in the next week or so at home with increased activity recommend out patient PT for gait and high level balance training.  Encouraged her to be well aware of her body and return to the ER should anything abnormal occur again.     [facility]™ “6 Clicks” Basic Mobility Inpatient Short Form  AM-PAC Basic Mobility - Inpatient   How much help is needed turning from your back to your side while in a flat bed without using bedrails?: None  How much help is needed moving from lying on your back to sitting on the side of a flat bed without using bedrails?: None  How much help is needed moving to and from a bed to a chair?: None  How much help is needed standing up from a chair using your arms?: A Little  How much help is needed walking in hospital room?: A Little  How much help is needed climbing 3-5 steps with a railing?: A Little  AM-[facility] Inpatient Mobility Raw Score : 21  AM-PAC Inpatient T-Scale Score : 50.25  Mobility Inpatient CMS 0-100% Score: 28.97  Mobility Inpatient CMS G-Code Modifier : CJ    SUBJECTIVE:   Ms. Cosme states, \"I was scared\"     Social/Functional Lives With: Significant other  Type of Home: Mobile home  Home Layout: One level  Home Access: Stairs to enter with rails  Ambulation Assistance: Independent  Transfer Assistance: Independent    OBJECTIVE:     PAIN: VITALS / O2: PRECAUTION / LINES / DRAINS:   Pre Treatment:    7/10  Was given pain medicine.  She aware when she can ask for it    Post Treatment: 7/10 Vitals        Oxygen      None    RESTRICTIONS/PRECAUTIONS:                    GROSS EVALUATION:  Intact Impaired (Comments):   AROM [x]     PROM []    Strength [x]     Balance [] Posture: Good  Sitting - Static: Good  Sitting - Dynamic: Good  Standing - Static: Good, -  Standing - Dynamic: Fair   Posture [] N/A   Sensation [x]     Coordination []   Heel to shin WFL   Tone []     Edema []

## 2024-01-03 NOTE — PLAN OF CARE
Problem: Discharge Planning  Goal: Discharge to home or other facility with appropriate resources  Outcome: Progressing     Problem: Safety - Adult  Goal: Free from fall injury  Outcome: Progressing     Problem: Pain  Goal: Verbalizes/displays adequate comfort level or baseline comfort level  Outcome: Progressing

## 2024-01-03 NOTE — PROGRESS NOTES
Hospitalist Progress Note   Admit Date:  2024 10:01 PM   Name:  Nai Cosme   Age:  63 y.o.  Sex:  female  :  1960   MRN:  [id number]   Room:      Presenting/Chief Complaint: No chief complaint on file.     Reason(s) for Admission: Dizziness [R42]     Hospital Course:   Nai Cosme is a 63 y.o. female with medical history of depression, migraines, htn, chronic pain, gastric bypass who presented to [place] ER for cc dizziness and confusion for several hours prior to arrival.  Associated headache and feeling of off balance.  States she had similar episode about 1 month ago.  Both episodes occurred while she was at work (works as a ).  CTA head and neck with no LVO. NIH 0.      CT head - No significant changes compared to prior study with no CT evidenc of acute intracranial abnormality. A small old lacunar infarct at the anterior right parietal lobe. Right sphenoid sinusitis.     Potassium 3, glucose 144, hemoglobin 8.4.      Subjective & 24hr Events:     Laying in bed this morning,  at the bedside.  Reports not knowing about previous stroke.  Had no idea about her drop in hemoglobin, has irregular bowel movements and last 1 was several days ago.  Denies any other GI symptoms.    Assessment & Plan:     Principal Problem:  Acute focal neurological deficit  Abcd: 3 pts, low risk  CT with previous infarct noted  Aspirin plus statin started  MRI with no acute infarct noted  History of migraines, cystic she is for complex migraine  Has history of cervical spine intervention, will obtain MRI cervical spine  Will obtain orthostatic vitals  Follow-up TTE  Concern for dizziness associated with anemia.  Low suspicion for GI bleed.    Active Problems    Hypertension  Hold hctz for now    Iron deficiency anemia  Patient reports being up-to-date on colonoscopy  History of gastric bypass  Will supplement iron p.o.  Will need to follow-up with GI outpatient      Cervical  spondylosis without myelopathy    Lumbar post-laminectomy syndrome  Will follow-up with MRI cervical spine      Chronic pain syndrome  Patient on gabapentin (with recent increased dose) and as needed oxycodone    Anxiety and depression  Resume Requip  Resume wellbutrin+ buspar      PT/OT evals and PPD needed/ordered?  Yes  Diet:  ADULT DIET; Regular; Low Fat/Low Chol/High Fiber/2 gm Na  VTE prophylaxis: Lovenox  Code status: Full Code      Non-peripheral Lines and Tubes (if present):          Telemetry (if present):  Cardiac/Telemetry Monitor On: Portable telemetry pack applied        Hospital Problems:  Principal Problem:    Dizziness  Active Problems:    Cervical spondylosis without myelopathy    Lumbar post-laminectomy syndrome    Chronic pain syndrome    Anxiety and depression  Resolved Problems:    * No resolved hospital problems. *      Objective:   Patient Vitals for the past 24 hrs:   Temp Pulse Resp BP SpO2   01/03/24 1600 98.1 °F (36.7 °C) 76 18 118/86 --   01/03/24 1512 -- -- -- -- 95 %   01/03/24 1200 98.1 °F (36.7 °C) 72 16 133/68 98 %   01/03/24 0800 98.2 °F (36.8 °C) 77 18 136/86 95 %   01/03/24 0653 -- -- 16 -- --   01/03/24 0417 98.1 °F (36.7 °C) 72 14 118/77 94 %   01/03/24 0027 -- -- 16 -- --   01/02/24 2200 97.9 °F (36.6 °C) 74 15 134/84 94 %       Oxygen Therapy  SpO2: 95 %  O2 Device: None (Room air)    Estimated body mass index is 24.51 kg/m² as calculated from the following:    Height as of this encounter: 1.575 m (5' 2\").    Weight as of this encounter: 60.8 kg (134 lb).    Intake/Output Summary (Last 24 hours) at 1/3/2024 1944  Last data filed at 1/3/2024 1200  Gross per 24 hour   Intake 916 ml   Output 1400 ml   Net -484 ml         Physical Exam:     General:    Well nourished.    Head:  Normocephalic, atraumatic  Eyes:  Sclerae appear normal.  Pupils equally round.  ENT:  Nares appear normal.  Moist oral mucosa  Neck:  No restricted ROM.  Trachea midline   CV:   RRR.  No m/r/g.  No

## 2024-01-03 NOTE — PROGRESS NOTES
ACUTE OCCUPATIONAL THERAPY GOALS:   (Developed with and agreed upon by patient and/or caregiver.)  Patient will complete functional mobility/transfers with stand by assist. -GOAL MET 1/3/24      OCCUPATIONAL THERAPY Initial Assessment and Discharge       OT Visit Days: 1  Acknowledge Orders  Time  OT Charge Capture  Rehab Caseload Tracker      Nai Cosme is a 63 y.o. female   PRIMARY DIAGNOSIS: Dizziness  Dizziness [R42]       Reason for Referral: Generalized Muscle Weakness (M62.81)  Dizziness and Giddiness (R42)  Inpatient: Payor: [employer] NINO / Plan: [employer] NINO / Product Type: *No Product type* /     ASSESSMENT:     REHAB RECOMMENDATIONS:   Recommendation to date pending progress:  Setting:  No further skilled occupational therapy after discharge from hospital    Equipment:    None     ASSESSMENT:  Ms. Cosme is a 63 year old admitted for dizziness and confusion. CT head and MRI negative for acute intracranial abnormality. Patient reports she is typically independent with ADL tasks, does not use any assistive devices and works part time at [employer]. Patient states she lives with her boyfriend. Patient is currently completing functional mobility and transfers with no assistive device at a stand by assist level for safety. Patient is completing ADL tasks at an independent-stand by assist level. Patient's B UE ROM/strength/sensation are WFL. Patient is functioning close to baseline level, no further skilled OT services recommended at this time, patient to return home with assistance from boyfriend as needed.      [facility] AM-PAC™ “6 Clicks” Daily Activity Inpatient Short Form:    AM-PAC Daily Activity - Inpatient   How much help is needed for putting on and taking off regular lower body clothing?: A Little  How much help is needed for bathing (which includes washing, rinsing, drying)?: A Little  How much help is needed for toileting (which includes using toilet, bedpan,  or urinal)?: A Little  How much help is needed for putting on and taking off regular upper body clothing?: None  How much help is needed for taking care of personal grooming?: None  How much help for eating meals?: None  AM-PAC Inpatient Daily Activity Raw Score: 21  AM-PAC Inpatient ADL T-Scale Score : 44.27  ADL Inpatient CMS 0-100% Score: 32.79  ADL Inpatient CMS G-Code Modifier : CJ           SUBJECTIVE:     Ms. Cosme states, \"I am feeling better.\"     Social/Functional Lives With: Significant other  Type of Home: Mobile home  Home Layout: One level  Home Access: Stairs to enter with rails  Entrance Stairs - Number of Steps: 2  Bathroom Shower/Tub: Walk-in shower  Bathroom Equipment: Shower chair  ADL Assistance: Independent  Ambulation Assistance: Independent  Transfer Assistance: Independent  Occupation: Part time employment    OBJECTIVE:     LINES / DRAINS / AIRWAY: NA    RESTRICTIONS/PRECAUTIONS:       PAIN: VITALS / O2:   Pre Treatment: did not report pain          Post Treatment: same        Vitals          Oxygen            GROSS EVALUATION: INTACT IMPAIRED   (See Comments)   UE AROM [x] []   UE PROM [] []   Strength [x]       Posture / Balance [] Posture: Good  Sitting - Static: Good  Sitting - Dynamic: Good  Standing - Static: Good, -  Standing - Dynamic: Fair   Sensation [x]     Coordination [x]       Tone []       Edema []    Activity Tolerance [x]       Hand Dominance R [] L []      COGNITION/  PERCEPTION: INTACT IMPAIRED   (See Comments)   Orientation [x]     Vision [x]     Hearing [x]     Cognition  [x]     Perception [x]       MOBILITY: I Mod I S SBA CGA Min Mod Max Total  NT x2 Comments:   Bed Mobility    Rolling [] [] [x] [] [] [] [] [] [] [] []    Supine to Sit [] [] [x] [] [] [] [] [] [] [] []    Scooting [] [] [x] [] [] [] [] [] [] [] []    Sit to Supine [] [] [x] [] [] [] [] [] [] [] []    Transfers    Sit to Stand [] [] [] [x] [] [] [] [] [] [] []    Bed to Chair [] [] [] [] [] [] [] []

## 2024-01-03 NOTE — ED NOTES
TRANSFER - OUT REPORT:    Verbal report given to PRABHA Chatterjee on Nai Cosme  being transferred to [place] [id number] for routine progression of patient care       Report consisted of patient's Situation, Background, Assessment and   Recommendations(SBAR).     Information from the following report(s) Nurse Handoff Report, ED Encounter Summary, ED SBAR, MAR, Cardiac Rhythm NSR, Neuro Assessment, and Event Log was reviewed with the receiving nurse.    Lines:   Peripheral IV 01/02/24 Right Antecubital (Active)   Site Assessment Clean, dry & intact 01/02/24 1553   Line Status Normal saline locked;Flushed;Specimen collected;Brisk blood return 01/02/24 1553   Phlebitis Assessment No symptoms 01/02/24 1553   Infiltration Assessment 0 01/02/24 1553   Alcohol Cap Used No 01/02/24 1553   Dressing Status New dressing applied;Clean, dry & intact 01/02/24 1553   Dressing Type Transparent 01/02/24 1553   Dressing Intervention New 01/02/24 1553        Opportunity for questions and clarification was provided.      Patient transported with:  Monitor

## 2024-01-03 NOTE — PROGRESS NOTES
Patient given a Patient Stroke Education book.  Patient educated on signs and symptoms of stroke and importance of getting to nearest ED as soon as symptoms occur.  Patient educated on risk factors of stroke.

## 2024-01-03 NOTE — H&P
Hospitalist History and Physical   Admit Date:  2024 10:01 PM   Name:  Nai Cosme   Age:  63 y.o.  Sex:  female  :  1960   MRN:  [id number]   Room:  [facility]/    Presenting/Chief Complaint: No chief complaint on file.     Reason(s) for Admission: Dizziness [R42]     History of Present Illness:   Nai Cosme is a 63 y.o. 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 Symptoms were constant for 4-4.5 hours but are starting to improve now. Still with symptoms and feeling slightly off balance. CTA head and neck with no LVO. NIH 0.     CT head - No significant changes compared to prior study with no CT evidence  of acute intracranial abnormality.     A small old lacunar infarct at the anterior right parietal lobe.     Right sphenoid sinusitis.    Hx of depression, HTN, chronic pain     K 3.   Assessment & Plan:     Active Problems:    Dizziness - CVA work up. ASA, statin.     Microcytic anemia - No obvious bleeding. Check iron studies.       Hypokalemia - Supplement.     Depression - Wellbutrin, buspar    Chronic pain - gabapentin (of note, this dose was recently increased, so if no CVA, this could be the reason for her dizziness). PRN oxycodone    Requip    PPI      PT/OT evals and PPD needed/ordered?  Yes  Diet: ADULT DIET; Regular; Low Fat/Low Chol/High Fiber/2 gm Na  VTE prophylaxis: SCD's   Code status: Full Code      Non-peripheral Lines and Tubes (if present):             Hospital Problems:  Principal Problem:    Dizziness  Active Problems:    Cervical spondylosis without myelopathy    Lumbar post-laminectomy syndrome    Chronic pain syndrome    Anxiety and depression  Resolved Problems:    * No resolved hospital problems. *      Past History:     Past Medical History:   Diagnosis Date    Anxiety     Back problem     Depression     Ex-smoker for less than 1 year     Quit 2018  1 ppd x 30 yrs

## 2024-01-03 NOTE — CARE COORDINATION
Pt is a 62 yo female admitted due to dizziness.  She is being evaluated for a possible CVA.  Chart reviewed and CM met with pt/significant other to discuss dc needs.  Demographics, insurance and PCP confirmed.  Pt is insured with pharmacy benefits and is well established with her PCP.  PT/OT evals complete with the recommendation for outpatient PT at [facility]. Pt is agreeable with this recommendation.  Outpatient PT referral faxed to  [facility] with the request for the [facility].  No other dc needs or concerns identified or reported.  CM remains available to assist as needed.       01/03/24 0076   Service Assessment   Patient Orientation Alert and Oriented   Cognition Alert   History Provided By Patient;Significant Other;Medical Record   Primary Caregiver Self   Support Systems Spouse/Significant Other;Family Members   Patient's Healthcare Decision Maker is: Legal Next of Kin   PCP Verified by CM Yes   Last Visit to PCP Within last 3 months   Prior Functional Level Independent in ADLs/IADLs   Current Functional Level Independent in ADLs/IADLs   Can patient return to prior living arrangement Yes   Ability to make needs known: Good   Family able to assist with home care needs: Yes   Would you like for me to discuss the discharge plan with any other family members/significant others, and if so, who? No   Financial Resources Other (Comment)  ([employer] NINO)   Community Resources None   Social/Functional History   Lives With Significant other   Type of Home Mobile home   Home Layout One level   Home Access Stairs to enter with rails   Entrance Stairs - Number of Steps 2   Bathroom Shower/Tub Walk-in shower   Bathroom Equipment Shower chair   ADL Assistance Independent   Homemaking Assistance Independent   Ambulation Assistance Independent   Transfer Assistance Independent   Active  Yes   Occupation Part time employment   Discharge Planning   Type of Residence House   Living Arrangements

## 2024-01-03 NOTE — PROGRESS NOTES
SPEECH LANGUAGE PATHOLOGY: DYSPHAGIA Initial Assessment and Discharge    Acknowledge Order  I  Therapy Time  I   Charges     I  Rehab Caseload Tracker      NAME: Nai Cosme  : 1960  MRN: [id number]    ADMISSION DATE: 2024  PRIMARY DIAGNOSIS: Dizziness    ICD-10: Treatment Diagnosis: R13.12 Dysphagia, Oropharyngeal Phase    RECOMMENDATIONS   Diet:    Regular Consistency  Thin Liquids    Medication: as tolerated   Compensatory Swallowing Strategies:   Upright as possible for all oral intake   Therapeutic Intervention:   Patient/family education   Patient continues to require skilled intervention:  No. Please re-consult if new concerns arise.      Anticipated Discharge Needs: No additional speech therapy is indicated.      ASSESSMENT    Patient presents with oropharyngeal swallow function that is within normal limits. No s/sx of airway compromise. MRI negative for acute changes     Recommend regular diet and thin liquids. Medications as tolerated. No additional speech therapy indicated at this time. Please consider re-consult if new concerns arise     GENERAL    Subjective: patient alert and oriented. Cooperative with evaluation     Recent Information/Background: Admitted with dizziness and ataxia. Some difficulty with vision and number recognition during incident. She reports improving symptoms. Now able to text without difficulty    History of Present Injury/Illness: Ms. Cosme  has a past medical history of Anxiety, Back problem, Depression, Ex-smoker for less than 1 year, Family history of anesthesia complication, GERD (gastroesophageal reflux disease), History of bone density study, History of mammogram, History of Papanicolaou smear of cervix, Hyperlipidemia, Hypertension, Hypokalemia, Nicotine vapor product user, and Sleep apnea.. She also  has a past surgical history that includes total abdom hysterectomy; Breast biopsy (Left); implant breast silicone/eq (Bilateral, ); lumbar    Rehabilitation Potential For Stated Goals: Excellent     Medical Necessity    No additional speech therapy indicated at this time.      Education:   Patient educated on Results of evaluation, Speech therapy recommendations, Role of speech therapy, and SLP plan  Education provided to Patient and RN  Education response: Verbalizes understanding     Safety:   Call light within reach  RN notified     Therapy Time:  Time In: 0930  Time Out: 0946  Minutes: 16    ADRIANO Crouch  1/3/2024 9:55 AM

## 2024-01-03 NOTE — FLOWSHEET NOTE
Orthostatics    01/03/24 1512   Vital Signs   Orthostatic B/P and Pulse? Yes   Blood Pressure Lying 124/77   Pulse Lying 70 PER MINUTE   Blood Pressure Sitting 122/80   Pulse Sitting 74 PER MINUTE   Blood Pressure Standing 118/86   Pulse Standing 76 PER MINUTE

## 2024-01-03 NOTE — ACP (ADVANCE CARE PLANNING)
[facility] Hospitalist Service  At the heart of better care     Advance Care Planning   Admit Date:  2024 10:01 PM   Name:  Nai Cosme   Age:  63 y.o.  Sex:  female  :  1960   MRN:  [id number]   Room:  [facility]    Nai Cosme has capacity to make her own decisions:   Yes    Patient / surrogate decision-maker directed code status:  Full Code      Patient or surrogate consented to discussion of the current conditions, workup, management plans, prognosis, and the risk for further deterioration.  Time spent: 17 minutes in direct discussion.      Signed:  COLE CARSON DO

## 2024-01-03 NOTE — DISCHARGE INSTRUCTIONS
You were admitted to [facility] on 1/2 due to concern for a stroke. Your MRI did show you had a previous stroke but not a new stroke.  You were started on aspirin and statin for this. You also had anemia and were started on iron. You need to see a GI doctor to follow up on this to make sure its not a bleeding issue vs an  absorption issue after your gastric bypass. Your MRI of your spine was normal as was the echocardiogram of your heart. Please follow up with your primary care doc in 1 week and get your blood count checked.         Stroke: After Your Visit    Your Care Instructions     Risk factors for stroke include being overweight, smoking, and sedentary lifestyle. This means that the blood flow to a part of your brain was blocked for some time, which damages the nerve cells in that part of the brain. The part of your body controlled by that part of your brain may not function properly now.    The brain is an amazing organ that can heal itself to some degree. The stroke you had damaged part of your brain, but other parts of your brain may take over in some way for the damaged areas. You have already started this process.    Going home may be hard for you and your family. The more you can try to do for yourself, the better. Remember to take each day one at a time.    Follow-up care is a key part of your treatment and safety. Be sure to make and go to all appointments, and call your doctor if you are having problems. It's also a good idea to know your test results and keep a list of the medicines you take.    How can you care for yourself at home?   Enter a stroke rehabilitation (rehab) program, if your doctor recommends it. Physical, speech, and occupational therapies can help you manage bathing, dressing, eating, and other basics of daily living.  Eat a heart-healthy diet that is low in cholesterol, saturated fat, and salt. Eat lots of fresh fruits and vegetables and foods high in fiber.  Increase your  Coumadin.  Wear medical alert jewelry that says you take blood thinners. You can buy this at most drugstores.  Do not take any over-the-counter medicines or herbal products without talking to your doctor first.  If you take birth control pills or hormone replacement therapy, talk to your doctor about whether they are right for you.    For family members and caregivers  Make the home safe. Set up a room so that your loved one does not have to climb stairs. Be sure the bathroom is on the same floor. Move throw rugs and furniture that could cause falls, and make sure that the lighting is good. Put grab bars and seats in tubs and showers.  Find out what your loved one can do and what he or she needs help with. Try not to do things for your loved one that your loved one can do on his or her own. Help him or her learn and practice new skills.  Visit and talk with your loved one often. Try doing activities together that you both enjoy, such as playing cards or board games. Keep in touch with your loved one's friends as much as you can, and encourage them to visit.  Take care of yourself. Do not try to do everything yourself. Ask other family members to help. Eat well, get enough rest, and take time to do things that you enjoy. Keep up with your own doctor visits, and make sure to take your medicines regularly. Get out of the house as much as you can. Join a local support group. Find out if you qualify for home health care visits to help with rehab or for adult day care.    When should you call for help?    Call 911 anytime you think you may need emergency care. For example, call if:  You have signs of another stroke. These may include:  Sudden numbness, paralysis, or weakness in your face, arm, or leg, especially on only one side of your body.  New problems with walking or balance.  Sudden vision changes.  Drooling or slurred speech.  New problems speaking or understanding simple statements, or you feel confused.  A

## 2024-01-04 VITALS
RESPIRATION RATE: 20 BRPM | HEART RATE: 73 BPM | SYSTOLIC BLOOD PRESSURE: 136 MMHG | BODY MASS INDEX: 24.66 KG/M2 | WEIGHT: 134 LBS | DIASTOLIC BLOOD PRESSURE: 77 MMHG | TEMPERATURE: 98.1 F | OXYGEN SATURATION: 97 % | HEIGHT: 62 IN

## 2024-01-04 LAB
ANION GAP SERPL CALC-SCNC: 3 MMOL/L (ref 2–11)
BUN SERPL-MCNC: 11 MG/DL (ref 8–23)
CALCIUM SERPL-MCNC: 8.4 MG/DL (ref 8.3–10.4)
CHLORIDE SERPL-SCNC: 112 MMOL/L (ref 103–113)
CO2 SERPL-SCNC: 26 MMOL/L (ref 21–32)
CREAT SERPL-MCNC: 0.8 MG/DL (ref 0.6–1)
ERYTHROCYTE [DISTWIDTH] IN BLOOD BY AUTOMATED COUNT: 15.8 % (ref 11.9–14.6)
GLUCOSE SERPL-MCNC: 98 MG/DL (ref 65–100)
HCT VFR BLD AUTO: 29.1 % (ref 35.8–46.3)
HGB BLD-MCNC: 7.8 G/DL (ref 11.7–15.4)
MCH RBC QN AUTO: 19.6 PG (ref 26.1–32.9)
MCHC RBC AUTO-ENTMCNC: 26.8 G/DL (ref 31.4–35)
MCV RBC AUTO: 73.3 FL (ref 82–102)
NRBC # BLD: 0 K/UL (ref 0–0.2)
PLATELET # BLD AUTO: 427 K/UL (ref 150–450)
PMV BLD AUTO: 9.5 FL (ref 9.4–12.3)
POTASSIUM SERPL-SCNC: 3.6 MMOL/L (ref 3.5–5.1)
RBC # BLD AUTO: 3.97 M/UL (ref 4.05–5.2)
SODIUM SERPL-SCNC: 141 MMOL/L (ref 136–146)
WBC # BLD AUTO: 7.3 K/UL (ref 4.3–11.1)

## 2024-01-04 PROCEDURE — 6370000000 HC RX 637 (ALT 250 FOR IP): Performed by: STUDENT IN AN ORGANIZED HEALTH CARE EDUCATION/TRAINING PROGRAM

## 2024-01-04 PROCEDURE — 36415 COLL VENOUS BLD VENIPUNCTURE: CPT

## 2024-01-04 PROCEDURE — 80048 BASIC METABOLIC PNL TOTAL CA: CPT

## 2024-01-04 PROCEDURE — 85027 COMPLETE CBC AUTOMATED: CPT

## 2024-01-04 PROCEDURE — 2580000003 HC RX 258: Performed by: FAMILY MEDICINE

## 2024-01-04 PROCEDURE — 6370000000 HC RX 637 (ALT 250 FOR IP): Performed by: FAMILY MEDICINE

## 2024-01-04 PROCEDURE — G0378 HOSPITAL OBSERVATION PER HR: HCPCS

## 2024-01-04 RX ORDER — ROSUVASTATIN CALCIUM 40 MG/1
40 TABLET, COATED ORAL NIGHTLY
Qty: 30 TABLET | Refills: 3 | Status: SHIPPED | OUTPATIENT
Start: 2024-01-04

## 2024-01-04 RX ORDER — FERROUS SULFATE 325(65) MG
325 TABLET ORAL
Qty: 30 TABLET | Refills: 3 | Status: SHIPPED | OUTPATIENT
Start: 2024-01-05

## 2024-01-04 RX ORDER — BUTALBITAL, ACETAMINOPHEN AND CAFFEINE 50; 325; 40 MG/1; MG/1; MG/1
1 TABLET ORAL EVERY 6 HOURS PRN
Qty: 120 TABLET | Refills: 0 | Status: SHIPPED | OUTPATIENT
Start: 2024-01-04 | End: 2024-02-03

## 2024-01-04 RX ORDER — ASPIRIN 81 MG/1
81 TABLET, CHEWABLE ORAL DAILY
Qty: 30 TABLET | Refills: 3 | Status: SHIPPED | OUTPATIENT
Start: 2024-01-05

## 2024-01-04 RX ORDER — BUPROPION HYDROCHLORIDE 300 MG/1
300 TABLET ORAL EVERY MORNING
Qty: 30 TABLET | Refills: 3 | Status: SHIPPED | OUTPATIENT
Start: 2024-01-05

## 2024-01-04 RX ADMIN — OXYCODONE HYDROCHLORIDE 10 MG: 5 TABLET ORAL at 05:45

## 2024-01-04 RX ADMIN — ASPIRIN 81 MG: 81 TABLET, CHEWABLE ORAL at 09:53

## 2024-01-04 RX ADMIN — GABAPENTIN 800 MG: 400 CAPSULE ORAL at 09:53

## 2024-01-04 RX ADMIN — POTASSIUM CHLORIDE 10 MEQ: 1500 TABLET, EXTENDED RELEASE ORAL at 09:53

## 2024-01-04 RX ADMIN — FERROUS SULFATE TAB 325 MG (65 MG ELEMENTAL FE) 325 MG: 325 (65 FE) TAB at 09:53

## 2024-01-04 RX ADMIN — SODIUM CHLORIDE, PRESERVATIVE FREE 5 ML: 5 INJECTION INTRAVENOUS at 09:56

## 2024-01-04 RX ADMIN — BUTALBITAL, ACETAMINOPHEN, AND CAFFEINE 1 TABLET: 50; 325; 40 TABLET ORAL at 05:48

## 2024-01-04 RX ADMIN — BUPROPION HYDROCHLORIDE 300 MG: 300 TABLET, EXTENDED RELEASE ORAL at 09:53

## 2024-01-04 RX ADMIN — BUSPIRONE HYDROCHLORIDE 10 MG: 10 TABLET ORAL at 09:53

## 2024-01-04 ASSESSMENT — PAIN SCALES - GENERAL
PAINLEVEL_OUTOF10: 0
PAINLEVEL_OUTOF10: 0
PAINLEVEL_OUTOF10: 8

## 2024-01-04 ASSESSMENT — PAIN DESCRIPTION - PAIN TYPE: TYPE: CHRONIC PAIN

## 2024-01-04 ASSESSMENT — PAIN DESCRIPTION - DESCRIPTORS: DESCRIPTORS: ACHING

## 2024-01-04 ASSESSMENT — PAIN DESCRIPTION - LOCATION: LOCATION: HEAD

## 2024-01-04 ASSESSMENT — PAIN DESCRIPTION - ONSET: ONSET: GRADUAL

## 2024-01-04 ASSESSMENT — PAIN DESCRIPTION - FREQUENCY: FREQUENCY: INTERMITTENT

## 2024-01-04 ASSESSMENT — PAIN DESCRIPTION - ORIENTATION: ORIENTATION: POSTERIOR;MID

## 2024-01-04 ASSESSMENT — PAIN - FUNCTIONAL ASSESSMENT: PAIN_FUNCTIONAL_ASSESSMENT: ACTIVITIES ARE NOT PREVENTED

## 2024-01-04 NOTE — CARE COORDINATION
Pt was medically cleared for dc to home today with outpatient PT through  [facility].  No other dc needs or concerns identified or reported.       01/03/24 1526   Service Assessment   Patient Orientation Alert and Oriented   Cognition Alert   History Provided By Patient;Significant Other;Medical Record   Primary Caregiver Self   Support Systems Spouse/Significant Other;Family Members   Patient's Healthcare Decision Maker is: Legal Next of Kin   PCP Verified by CM Yes   Last Visit to PCP Within last 3 months   Prior Functional Level Independent in ADLs/IADLs   Current Functional Level Independent in ADLs/IADLs   Can patient return to prior living arrangement Yes   Ability to make needs known: Good   Family able to assist with home care needs: Yes   Would you like for me to discuss the discharge plan with any other family members/significant others, and if so, who? No   Financial Resources Other (Comment)  ([employer])   Community Resources None   Social/Functional History   Lives With Significant other   Type of Home Mobile home   Home Layout One level   Home Access Stairs to enter with rails   Entrance Stairs - Number of Steps 2   Bathroom Shower/Tub Walk-in shower   Bathroom Equipment Shower chair   ADL Assistance Independent   Homemaking Assistance Independent   Ambulation Assistance Independent   Transfer Assistance Independent   Active  Yes   Occupation Part time employment   Discharge Planning   Type of Residence House   Living Arrangements Spouse/Significant Other   Current Services Prior To Admission None   Potential Assistance Needed Outpatient PT/OT   DME Ordered? No   Potential Assistance Purchasing Medications No   Type of Home Care Services None   Patient expects to be discharged to: Trailer/mobile home   History of falls? 1   Services At/After Discharge   Transition of Care Consult (CM Consult) Discharge Planning   Services At/After Discharge PT;Outpatient

## 2024-01-04 NOTE — DISCHARGE SUMMARY
Hospitalist Discharge Summary   Admit Date:  2024 10:01 PM   DC Note date: 2024  Name:  Nai Cosme   Age:  63 y.o.  Sex:  female  :  1960   MRN:  [id number]   Room:  [facility]  PCP:  Alisia Loyola DO    Presenting Complaint: No chief complaint on file.     Initial Admission Diagnosis: Dizziness [R42]     Problem List for this Hospitalization (present on admission):    Principal Problem:    Dizziness  Active Problems:    Cervical spondylosis without myelopathy    Lumbar post-laminectomy syndrome    Chronic pain syndrome    Anxiety and depression  Resolved Problems:    * No resolved hospital problems. *      Hospital Course:  Nai Cosme is a 63 y.o. female with medical history of depression, migraines, htn, chronic pain, gastric bypass who presented to [facility] for cc dizziness and confusion for several hours prior to arrival.  Associated headache and feeling of off balance.  States she had similar episode about 1 month ago.  Both episodes occurred while she was at work (works as a ).  CTA head and neck with no LVO. NIH 0.      CT head - No significant changes compared to prior study with no CT evidenc of acute intracranial abnormality. A small old lacunar infarct at the anterior right parietal lobe. Right sphenoid sinusitis.     Potassium 3, glucose 144, hemoglobin 8.4.    MRI brain negative for stroke.  MRI cervical spine negative for cervical stenosis.  Orthostatic vitals negative.  Echocardiogram normal.  Hemoglobin trended down to 7.6 but remained stable.  No evidence of GI bleed.  Iron deficiency noted on labs and iron supplementation began.  PT recommending outpatient rehab.  Had extensive discussion regarding TIA versus complex migraine versus anemia.  Given evidence of prior CVA on CT/MRI we will begin aspirin and statin.  Will continue iron supplementation with referral to GI for further workup regarding malabsorption given history of gastric bypass.  She has had  admission:  IP CONSULT TO CASE MANAGEMENT    Echocardiogram results:  01/02/24    ECHO (TTE) COMPLETE (PRN CONTRAST/BUBBLE/STRAIN/3D) 01/03/2024 11:42 AM (Final)    Interpretation Summary    Left Ventricle: Normal left ventricular systolic function. EF by 2D Simpsons Biplane is 55%. Left ventricle size is normal. Normal wall thickness. Normal wall motion. Normal diastolic function.    Interatrial Septum: Agitated saline study was negative with and without provocation.    Image quality is good.    Signed by: Sincere Sim MD on 1/3/2024 11:42 AM      Diagnostic Imaging/Tests:   MRI CERVICAL SPINE W WO CONTRAST    Result Date: 1/3/2024  Anterior cervical fixation from C4-C6 levels with no evidence of canal or foraminal stenosis.     MRI brain without contrast    Result Date: 1/3/2024  No evidence of acute intracranial abnormality. No acute or chronic infarcts on MRI. Opacification of right sphenoid sinus on CT has variable signal on MRI. The sphenoid sinus does not have fluid signal on T2 propeller series 4 image 7, suggesting differential diagnosis of papilloma. Recommend follow-up MRI with IV gadolinium.    CT HEAD WO CONTRAST    Result Date: 1/2/2024  No significant changes compared to prior study with no CT evidence of acute intracranial abnormality. A small old lacunar infarct at the anterior right parietal lobe. Right sphenoid sinusitis.    CTA HEAD NECK W CONTRAST    Result Date: 1/2/2024  No evidence of large vessel occlusion. Sphenoid sinus disease        Labs: Results:       BMP, Mg, Phos Recent Labs     01/02/24  1550 01/02/24  1552 01/03/24  0352 01/04/24  0357     --  141 141   K 3.0*  --  3.5 3.6     --  111 112   CO2 26  --  27 26   ANIONGAP 12*  --  3 3   BUN 11  --  11 11   CREATININE 0.67  --  0.70 0.80   LABGLOM >60  --  >60 >60   CALCIUM 9.3  --  8.2* 8.4   GLUCOSE 133* 144 92 98   MG 2.2  --  2.7*  --       CBC

## 2024-01-04 NOTE — PLAN OF CARE
Problem: Discharge Planning  Goal: Discharge to home or other facility with appropriate resources  1/3/2024 2018 by Romina Joyner, RN  Outcome: Progressing  Flowsheets (Taken 1/3/2024 0800 by Cherelle Garcia, RN)  Discharge to home or other facility with appropriate resources: Identify discharge learning needs (meds, wound care, etc)  1/3/2024 0737 by Cherelle Garcia, RN  Outcome: Progressing     Problem: Safety - Adult  Goal: Free from fall injury  1/3/2024 2018 by Romina Joyner, RN  Outcome: Progressing  1/3/2024 0737 by Cherelle Garcia, RN  Outcome: Progressing     Problem: Pain  Goal: Verbalizes/displays adequate comfort level or baseline comfort level  1/3/2024 2018 by Romina Joyner, RN  Outcome: Progressing  1/3/2024 0737 by Cherelle Garcia, RN  Outcome: Progressing

## 2024-01-10 NOTE — PROGRESS NOTES
Physician Progress Note      PATIENT:               FERMIN GARRIDO  [facility] #:                  [id number]  :                       1960  ADMIT DATE:       2024 10:01 PM  DISCH DATE:        2024 12:10 PM  RESPONDING  PROVIDER #:        Letty Quiñones MD          QUERY TEXT:    Pt admitted with Dizziness. If possible, please document in progress notes and   discharge summary the relationship, if any, between Dizziness and any of the   following.    The medical record reflects the following:  Risk Factors: hx of complex migraine, anemia, prior CVA  Clinical Indicators: discharge summary noted MRI brain negative for stroke.    MRI cervical spine negative for cervical stenosis.  Had extensive discussion   regarding TIA versus complex migraine versus anemia.  Given evidence of prior   CVA on CT/MRI we will begin aspirin and statin.  Will continue iron   supplementation with referral to GI for further workup regarding malabsorption   given history of gastric bypass.  Treatment: CTA, MRI, Echo, Daily labs  Options provided:  -- Dizziness due to TIA  -- Dizziness due to complex migraine  -- Dizziness due to anemia  -- Other - I will add my own diagnosis  -- Disagree - Not applicable / Not valid  -- Disagree - Clinically unable to determine / Unknown  -- Refer to Clinical Documentation Reviewer    PROVIDER RESPONSE TEXT:    This patient has Dizziness due to anemia.    Query created by: Shanel Bland on 2024 10:30 AM      Electronically signed by:  Letty Quiñones MD 2024 7:11 PM

## 2024-05-14 ENCOUNTER — HOSPITAL ENCOUNTER (OUTPATIENT)
Dept: MRI IMAGING | Age: 64
Discharge: HOME | End: 2024-05-17
Payer: MEDICAID

## 2024-05-14 DIAGNOSIS — M25.552 LEFT HIP PAIN: ICD-10-CM

## 2024-05-14 DIAGNOSIS — M54.16 LUMBAR RADICULOPATHY: ICD-10-CM

## 2024-05-14 DIAGNOSIS — M25.551 PAIN IN RIGHT HIP: ICD-10-CM

## 2024-05-14 PROCEDURE — 72148 MRI LUMBAR SPINE W/O DYE: CPT

## 2024-10-06 ENCOUNTER — HOSPITAL ENCOUNTER (EMERGENCY)
Age: 64
Discharge: HOME | End: 2024-10-06
Attending: EMERGENCY MEDICINE
Payer: MEDICARE

## 2024-10-06 VITALS
RESPIRATION RATE: 16 BRPM | HEIGHT: 62 IN | OXYGEN SATURATION: 95 % | BODY MASS INDEX: 23 KG/M2 | DIASTOLIC BLOOD PRESSURE: 75 MMHG | SYSTOLIC BLOOD PRESSURE: 143 MMHG | HEART RATE: 81 BPM | TEMPERATURE: 98.9 F | WEIGHT: 125 LBS

## 2024-10-06 DIAGNOSIS — J01.90 ACUTE SINUSITIS, RECURRENCE NOT SPECIFIED, UNSPECIFIED LOCATION: Primary | ICD-10-CM

## 2024-10-06 DIAGNOSIS — R11.2 NAUSEA AND VOMITING, UNSPECIFIED VOMITING TYPE: ICD-10-CM

## 2024-10-06 LAB
FLUAV RNA SPEC QL NAA+PROBE: NOT DETECTED
FLUBV RNA SPEC QL NAA+PROBE: NOT DETECTED
SARS-COV-2 RDRP RESP QL NAA+PROBE: NOT DETECTED
SOURCE: NORMAL

## 2024-10-06 PROCEDURE — 6360000002 HC RX W HCPCS: Performed by: EMERGENCY MEDICINE

## 2024-10-06 PROCEDURE — 87635 SARS-COV-2 COVID-19 AMP PRB: CPT

## 2024-10-06 PROCEDURE — 96372 THER/PROPH/DIAG INJ SC/IM: CPT

## 2024-10-06 PROCEDURE — 87502 INFLUENZA DNA AMP PROBE: CPT

## 2024-10-06 PROCEDURE — 99284 EMERGENCY DEPT VISIT MOD MDM: CPT

## 2024-10-06 PROCEDURE — 6370000000 HC RX 637 (ALT 250 FOR IP): Performed by: EMERGENCY MEDICINE

## 2024-10-06 RX ORDER — ONDANSETRON 4 MG/1
4 TABLET, ORALLY DISINTEGRATING ORAL 4 TIMES DAILY PRN
Qty: 21 TABLET | Refills: 0 | Status: SHIPPED | OUTPATIENT
Start: 2024-10-06

## 2024-10-06 RX ORDER — ONDANSETRON 4 MG/1
4 TABLET, ORALLY DISINTEGRATING ORAL
Status: COMPLETED | OUTPATIENT
Start: 2024-10-06 | End: 2024-10-06

## 2024-10-06 RX ORDER — CLINDAMYCIN HCL 150 MG
300 CAPSULE ORAL 4 TIMES DAILY
Qty: 56 CAPSULE | Refills: 0 | Status: SHIPPED | OUTPATIENT
Start: 2024-10-06 | End: 2024-10-13

## 2024-10-06 RX ORDER — PSEUDOEPHEDRINE HCL 30 MG
30 TABLET ORAL EVERY 6 HOURS PRN
Qty: 19 TABLET | Refills: 0 | Status: SHIPPED | OUTPATIENT
Start: 2024-10-06 | End: 2024-10-13

## 2024-10-06 RX ORDER — GUAIFENESIN AND DEXTROMETHORPHAN HYDROBROMIDE 1200; 60 MG/1; MG/1
1 TABLET, EXTENDED RELEASE ORAL 2 TIMES DAILY
Qty: 28 TABLET | Refills: 0 | Status: SHIPPED | OUTPATIENT
Start: 2024-10-06

## 2024-10-06 RX ORDER — KETOROLAC TROMETHAMINE 30 MG/ML
30 INJECTION, SOLUTION INTRAMUSCULAR; INTRAVENOUS ONCE
Status: COMPLETED | OUTPATIENT
Start: 2024-10-06 | End: 2024-10-06

## 2024-10-06 RX ORDER — METHYLPREDNISOLONE 4 MG
TABLET, DOSE PACK ORAL
Qty: 1 KIT | Refills: 0 | Status: SHIPPED | OUTPATIENT
Start: 2024-10-06

## 2024-10-06 RX ADMIN — ONDANSETRON 4 MG: 4 TABLET, ORALLY DISINTEGRATING ORAL at 07:46

## 2024-10-06 RX ADMIN — KETOROLAC TROMETHAMINE 30 MG: 30 INJECTION, SOLUTION INTRAMUSCULAR at 07:46

## 2024-10-06 ASSESSMENT — PAIN SCALES - GENERAL
PAINLEVEL_OUTOF10: 9
PAINLEVEL_OUTOF10: 9
PAINLEVEL_OUTOF10: 4
PAINLEVEL_OUTOF10: 4

## 2024-10-06 ASSESSMENT — PAIN DESCRIPTION - LOCATION: LOCATION: GENERALIZED

## 2024-10-06 ASSESSMENT — PAIN - FUNCTIONAL ASSESSMENT: PAIN_FUNCTIONAL_ASSESSMENT: 0-10

## 2024-10-06 ASSESSMENT — PAIN DESCRIPTION - DESCRIPTORS: DESCRIPTORS: ACHING

## 2024-10-06 NOTE — ED NOTES
Patient mobility status  with no difficulty. Provider aware     I have reviewed discharge instructions with the patient and spouse.  The patient and spouse verbalized understanding.    Patient left ED via Discharge Method: ambulatory to Home with Spouse.    Opportunity for questions and clarification provided.     Patient given 5 scripts.

## 2024-10-06 NOTE — ED PROVIDER NOTES
Procedures    COVID-19, Rapid    Influenza A/B, Molecular        Medications given during this emergency department visit:  Medications   ondansetron (ZOFRAN-ODT) disintegrating tablet 4 mg (4 mg Oral Given 10/6/24 0746)   ketorolac (TORADOL) injection 30 mg (30 mg IntraMUSCular Given 10/6/24 0746)       New Prescriptions    CLINDAMYCIN (CLEOCIN) 150 MG CAPSULE    Take 2 capsules by mouth 4 times daily for 7 days    DEXTROMETHORPHAN-GUAIFENESIN (MUCINEX DM MAXIMUM STRENGTH)  MG TB12    Take 1 tablet by mouth in the morning and at bedtime    METHYLPREDNISOLONE (MEDROL DOSEPACK) 4 MG TABLET    Take by mouth as directed on pack    ONDANSETRON (ZOFRAN-ODT) 4 MG DISINTEGRATING TABLET    Take 1 tablet by mouth 4 times daily as needed for Nausea or Vomiting    PSEUDOEPHEDRINE (DECONGESTANT) 30 MG TABLET    Take 1 tablet by mouth every 6 hours as needed for Congestion        Past Medical History:   Diagnosis Date    Anxiety     Back problem     Depression     Ex-smoker for less than 1 year     Quit 1/2018  1 ppd x 30 yrs    Family history of anesthesia complication     Patient's father developed an arrhythmia in PACU s/p hip arthroplasty    GERD (gastroesophageal reflux disease)     well controlled with meds    History of bone density study 02/2020    osteoporosis    History of mammogram 08/2020    History of Papanicolaou smear of cervix >5 years aog    Hyperlipidemia     Hypertension     hctz    Hypokalemia 1/11/2023    Nicotine vapor product user     0.9%    Sleep apnea     no CPAP        Past Surgical History:   Procedure Laterality Date    BREAST BIOPSY Left     BREAST SURGERY  2014    CHOLECYSTECTOMY, LAPAROSCOPIC      1999    GASTRIC BYPASS SURGERY  2014    Dr. 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

## 2024-10-06 NOTE — DISCHARGE INSTRUCTIONS
Complete all antibiotics  Complete steroid Dosepak    Use nausea medicine as needed    Drink plenty of fluids    Call your doctor in the morning for recheck visit    Return to ER for any worsening symptoms or new problems which may arise

## (undated) DEVICE — PACK PROCEDURE SURG POST LAMINECTOMY CDS

## (undated) DEVICE — SOLUTION IV 1000ML 0.9% SOD CHL

## (undated) DEVICE — GOWN,PREVENTION PLUS,2XL,ST,22/CS: Brand: MEDLINE

## (undated) DEVICE — KENDALL SCD EXPRESS SLEEVES, KNEE LENGTH, MEDIUM: Brand: KENDALL SCD

## (undated) DEVICE — ADHESIVE SKIN CLOSURE 4X22 CM PREMIERPRO EXOFINFUSION DISP

## (undated) DEVICE — AGENT HEMSTAT W3XL4IN OXIDIZED REGENERATED CELOS ABSRB FOR

## (undated) DEVICE — (D)PREP SKN CHLRAPRP APPL 26ML -- CONVERT TO ITEM 371833

## (undated) DEVICE — PACKING 8004002 NEURAY 200PK 13X25MM: Brand: NEURAY ®

## (undated) DEVICE — 3M™ TEGADERM™ TRANSPARENT FILM DRESSING FRAME STYLE, 1628, 6 IN X 8 IN (15 CM X 20 CM), 10/CT 8CT/CASE: Brand: 3M™ TEGADERM™

## (undated) DEVICE — SUTURE VCRL + SZ 3-0 L18IN ABSRB UD SH 1/2 CIR TAPERCUT NDL VCP864D

## (undated) DEVICE — 2000CC GUARDIAN II: Brand: GUARDIAN

## (undated) DEVICE — DURASEAL® DURAL SEALANT SYSTEM 5ML 5 PACK: Brand: DURASEAL®

## (undated) DEVICE — AMD ANTIMICROBIAL NON-ADHERENT PAD,0.2% POLYHEXAMETHYLENE BIGUANIDE HCI (PHMB): Brand: TELFA

## (undated) DEVICE — SYR 10ML LUER LOK 1/5ML GRAD --

## (undated) DEVICE — WAX SURG 2.5GM HEMSTAT BNE BEESWAX PARAFFIN ISO PALMITATE

## (undated) DEVICE — 1010 S-DRAPE TOWEL DRAPE 10/BX: Brand: STERI-DRAPE™

## (undated) DEVICE — SUTURE VCRL VIO BR 0 18IN C/R M04 J701D

## (undated) DEVICE — SUT ETHLN 3-0 18IN PS1 BLK --

## (undated) DEVICE — STANDARD HYPODERMIC NEEDLE,POLYPROPYLENE HUB: Brand: MONOJECT

## (undated) DEVICE — SUTURE NRLN SZ 4-0 L18IN NONABSORBABLE BLK L13MM TF 1/2 CIR C584D

## (undated) DEVICE — INTENDED FOR TISSUE SEPARATION, AND OTHER PROCEDURES THAT REQUIRE A SHARP SURGICAL BLADE TO PUNCTURE OR CUT.: Brand: BARD-PARKER SAFETY BLADES SIZE 15, STERILE

## (undated) DEVICE — AGENT HEMSTAT 8ML FLX TIP MTRX + DISP SURGIFLO

## (undated) DEVICE — DRSG AQUACEL SURG 3.5X6IN -- CONVERT TO ITEM 369227

## (undated) DEVICE — NEEDLE HYPO 21GA L1.5IN INTRAMUSCULAR S STL LATCH BVL UP

## (undated) DEVICE — SURGIFOAM SPNG SZ 100

## (undated) DEVICE — TRAY CATH 16F DRN BG LTX -- CONVERT TO ITEM 363158

## (undated) DEVICE — DRAPE TWL SURG 16X26IN BLU ORB04] ALLCARE INC]

## (undated) DEVICE — REM POLYHESIVE ADULT PATIENT RETURN ELECTRODE: Brand: VALLEYLAB

## (undated) DEVICE — NEEDLE HYPO 25GA L1.5IN BLU POLYPR HUB S STL REG BVL STR